# Patient Record
Sex: MALE | Race: WHITE | NOT HISPANIC OR LATINO | Employment: FULL TIME | URBAN - NONMETROPOLITAN AREA
[De-identification: names, ages, dates, MRNs, and addresses within clinical notes are randomized per-mention and may not be internally consistent; named-entity substitution may affect disease eponyms.]

---

## 2017-04-10 ENCOUNTER — APPOINTMENT (OUTPATIENT)
Dept: LAB | Facility: HOSPITAL | Age: 42
End: 2017-04-10

## 2017-04-10 ENCOUNTER — OFFICE VISIT (OUTPATIENT)
Dept: FAMILY MEDICINE CLINIC | Facility: CLINIC | Age: 42
End: 2017-04-10

## 2017-04-10 VITALS
HEART RATE: 106 BPM | OXYGEN SATURATION: 98 % | HEIGHT: 70 IN | SYSTOLIC BLOOD PRESSURE: 126 MMHG | BODY MASS INDEX: 31.02 KG/M2 | WEIGHT: 216.7 LBS | DIASTOLIC BLOOD PRESSURE: 84 MMHG

## 2017-04-10 DIAGNOSIS — M54.41 CHRONIC BILATERAL LOW BACK PAIN WITH BILATERAL SCIATICA: ICD-10-CM

## 2017-04-10 DIAGNOSIS — M54.42 CHRONIC BILATERAL LOW BACK PAIN WITH BILATERAL SCIATICA: ICD-10-CM

## 2017-04-10 DIAGNOSIS — Z23 NEED FOR TDAP VACCINATION: ICD-10-CM

## 2017-04-10 DIAGNOSIS — I10 ESSENTIAL HYPERTENSION: ICD-10-CM

## 2017-04-10 DIAGNOSIS — Z76.89 ENCOUNTER TO ESTABLISH CARE: Primary | ICD-10-CM

## 2017-04-10 DIAGNOSIS — Z76.0 MEDICATION REFILL: ICD-10-CM

## 2017-04-10 DIAGNOSIS — G89.29 CHRONIC BILATERAL LOW BACK PAIN WITH BILATERAL SCIATICA: ICD-10-CM

## 2017-04-10 PROBLEM — G43.009 MIGRAINE WITHOUT AURA AND WITHOUT STATUS MIGRAINOSUS, NOT INTRACTABLE: Status: ACTIVE | Noted: 2017-04-10

## 2017-04-10 PROBLEM — J30.2 SEASONAL ALLERGIES: Status: ACTIVE | Noted: 2017-04-10

## 2017-04-10 PROBLEM — M54.40 CHRONIC BILATERAL LOW BACK PAIN WITH SCIATICA: Status: ACTIVE | Noted: 2017-04-10

## 2017-04-10 PROBLEM — J45.20 MILD INTERMITTENT ASTHMA WITHOUT COMPLICATION: Status: ACTIVE | Noted: 2017-04-10

## 2017-04-10 LAB
ALBUMIN SERPL-MCNC: 4.7 G/DL (ref 3.4–4.8)
ALBUMIN/GLOB SERPL: 1.5 G/DL (ref 1.1–1.8)
ALP SERPL-CCNC: 69 U/L (ref 38–126)
ALT SERPL W P-5'-P-CCNC: 25 U/L (ref 21–72)
ANION GAP SERPL CALCULATED.3IONS-SCNC: 17 MMOL/L (ref 5–15)
AST SERPL-CCNC: 59 U/L (ref 17–59)
BASOPHILS # BLD AUTO: 0.03 10*3/MM3 (ref 0–0.2)
BASOPHILS NFR BLD AUTO: 0.3 % (ref 0–2)
BILIRUB SERPL-MCNC: 0.9 MG/DL (ref 0.2–1.3)
BUN BLD-MCNC: 20 MG/DL (ref 7–21)
BUN/CREAT SERPL: 16.7 (ref 7–25)
CALCIUM SPEC-SCNC: 10 MG/DL (ref 8.4–10.2)
CHLORIDE SERPL-SCNC: 106 MMOL/L (ref 95–110)
CO2 SERPL-SCNC: 23 MMOL/L (ref 22–31)
CREAT BLD-MCNC: 1.2 MG/DL (ref 0.7–1.3)
DEPRECATED RDW RBC AUTO: 42.3 FL (ref 35.1–43.9)
EOSINOPHIL # BLD AUTO: 0.1 10*3/MM3 (ref 0–0.7)
EOSINOPHIL NFR BLD AUTO: 0.9 % (ref 0–7)
ERYTHROCYTE [DISTWIDTH] IN BLOOD BY AUTOMATED COUNT: 13.4 % (ref 11.5–14.5)
GFR SERPL CREATININE-BSD FRML MDRD: 66 ML/MIN/1.73 (ref 60–147)
GLOBULIN UR ELPH-MCNC: 3.2 GM/DL (ref 2.3–3.5)
GLUCOSE BLD-MCNC: 92 MG/DL (ref 60–100)
HCT VFR BLD AUTO: 45.3 % (ref 39–49)
HGB BLD-MCNC: 15.9 G/DL (ref 13.7–17.3)
IMM GRANULOCYTES # BLD: 0.02 10*3/MM3 (ref 0–0.02)
IMM GRANULOCYTES NFR BLD: 0.2 % (ref 0–0.5)
LYMPHOCYTES # BLD AUTO: 3.14 10*3/MM3 (ref 0.6–4.2)
LYMPHOCYTES NFR BLD AUTO: 29.3 % (ref 10–50)
MCH RBC QN AUTO: 30.2 PG (ref 26.5–34)
MCHC RBC AUTO-ENTMCNC: 35.1 G/DL (ref 31.5–36.3)
MCV RBC AUTO: 86.1 FL (ref 80–98)
MONOCYTES # BLD AUTO: 0.46 10*3/MM3 (ref 0–0.9)
MONOCYTES NFR BLD AUTO: 4.3 % (ref 0–12)
NEUTROPHILS # BLD AUTO: 6.95 10*3/MM3 (ref 2–8.6)
NEUTROPHILS NFR BLD AUTO: 65 % (ref 37–80)
PLATELET # BLD AUTO: 290 10*3/MM3 (ref 150–450)
PMV BLD AUTO: 10.9 FL (ref 8–12)
POTASSIUM BLD-SCNC: 4.1 MMOL/L (ref 3.5–5.1)
PROT SERPL-MCNC: 7.9 G/DL (ref 6.3–8.6)
RBC # BLD AUTO: 5.26 10*6/MM3 (ref 4.37–5.74)
SODIUM BLD-SCNC: 146 MMOL/L (ref 137–145)
WBC NRBC COR # BLD: 10.7 10*3/MM3 (ref 3.2–9.8)

## 2017-04-10 PROCEDURE — 85025 COMPLETE CBC W/AUTO DIFF WBC: CPT | Performed by: FAMILY MEDICINE

## 2017-04-10 PROCEDURE — 36415 COLL VENOUS BLD VENIPUNCTURE: CPT | Performed by: STUDENT IN AN ORGANIZED HEALTH CARE EDUCATION/TRAINING PROGRAM

## 2017-04-10 PROCEDURE — 80053 COMPREHEN METABOLIC PANEL: CPT | Performed by: FAMILY MEDICINE

## 2017-04-10 PROCEDURE — 90715 TDAP VACCINE 7 YRS/> IM: CPT | Performed by: STUDENT IN AN ORGANIZED HEALTH CARE EDUCATION/TRAINING PROGRAM

## 2017-04-10 PROCEDURE — 99203 OFFICE O/P NEW LOW 30 MIN: CPT | Performed by: STUDENT IN AN ORGANIZED HEALTH CARE EDUCATION/TRAINING PROGRAM

## 2017-04-10 PROCEDURE — 90471 IMMUNIZATION ADMIN: CPT | Performed by: STUDENT IN AN ORGANIZED HEALTH CARE EDUCATION/TRAINING PROGRAM

## 2017-04-10 RX ORDER — GABAPENTIN 100 MG/1
CAPSULE ORAL
Refills: 2 | COMMUNITY
Start: 2017-03-13 | End: 2017-04-10 | Stop reason: SDUPTHER

## 2017-04-10 RX ORDER — NAPROXEN 500 MG/1
500 TABLET ORAL 2 TIMES DAILY WITH MEALS
Qty: 60 TABLET | Refills: 5 | Status: SHIPPED | OUTPATIENT
Start: 2017-04-10 | End: 2017-04-21

## 2017-04-10 RX ORDER — HYDROCHLOROTHIAZIDE 12.5 MG/1
12.5 CAPSULE, GELATIN COATED ORAL EVERY MORNING
Qty: 30 CAPSULE | Refills: 5 | Status: SHIPPED | OUTPATIENT
Start: 2017-04-10 | End: 2019-12-02 | Stop reason: SDUPTHER

## 2017-04-10 RX ORDER — HYDROCODONE BITARTRATE AND ACETAMINOPHEN 7.5; 325 MG/1; MG/1
TABLET ORAL
Refills: 0 | COMMUNITY
Start: 2017-03-13 | End: 2019-12-02 | Stop reason: DRUGHIGH

## 2017-04-10 RX ORDER — AMITRIPTYLINE HYDROCHLORIDE 10 MG/1
TABLET, FILM COATED ORAL
Refills: 5 | COMMUNITY
Start: 2017-03-13 | End: 2017-04-10 | Stop reason: SDUPTHER

## 2017-04-10 RX ORDER — FLUTICASONE PROPIONATE 50 MCG
SPRAY, SUSPENSION (ML) NASAL
Refills: 5 | COMMUNITY
Start: 2017-03-13 | End: 2017-04-10 | Stop reason: SDUPTHER

## 2017-04-10 RX ORDER — LORATADINE 10 MG/1
10 TABLET ORAL DAILY
Qty: 30 TABLET | Refills: 5 | Status: SHIPPED | OUTPATIENT
Start: 2017-04-10 | End: 2019-12-02

## 2017-04-10 RX ORDER — AMITRIPTYLINE HYDROCHLORIDE 10 MG/1
10 TABLET, FILM COATED ORAL NIGHTLY
Qty: 30 TABLET | Refills: 5 | Status: SHIPPED | OUTPATIENT
Start: 2017-04-10 | End: 2019-12-02

## 2017-04-10 RX ORDER — FLUTICASONE PROPIONATE 50 MCG
1 SPRAY, SUSPENSION (ML) NASAL DAILY
Qty: 1 EACH | Refills: 5 | Status: SHIPPED | OUTPATIENT
Start: 2017-04-10

## 2017-04-10 RX ORDER — ALBUTEROL SULFATE 90 UG/1
2 AEROSOL, METERED RESPIRATORY (INHALATION) EVERY 4 HOURS PRN
Qty: 2 INHALER | Refills: 11 | Status: SHIPPED | OUTPATIENT
Start: 2017-04-10

## 2017-04-10 RX ORDER — ALBUTEROL SULFATE 90 UG/1
AEROSOL, METERED RESPIRATORY (INHALATION)
Refills: 11 | COMMUNITY
Start: 2017-02-11 | End: 2017-04-10 | Stop reason: SDUPTHER

## 2017-04-10 RX ORDER — GABAPENTIN 100 MG/1
300 CAPSULE ORAL 3 TIMES DAILY
Qty: 90 CAPSULE | Refills: 5 | Status: SHIPPED | OUTPATIENT
Start: 2017-04-10 | End: 2019-12-02

## 2017-04-10 RX ORDER — LORATADINE 10 MG/1
TABLET ORAL
Refills: 5 | COMMUNITY
Start: 2017-03-13 | End: 2017-04-10 | Stop reason: SDUPTHER

## 2017-04-10 RX ORDER — HYDROCHLOROTHIAZIDE 12.5 MG/1
CAPSULE, GELATIN COATED ORAL
Refills: 5 | COMMUNITY
Start: 2017-03-13 | End: 2017-04-10 | Stop reason: SDUPTHER

## 2017-04-10 RX ORDER — NAPROXEN 500 MG/1
TABLET ORAL
Refills: 5 | COMMUNITY
Start: 2017-03-13 | End: 2017-04-10 | Stop reason: SDUPTHER
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
## 2017-04-21 RX ORDER — MELOXICAM 15 MG/1
15 TABLET ORAL DAILY
Qty: 30 TABLET | Refills: 3 | Status: SHIPPED | OUTPATIENT
Start: 2017-04-21 | End: 2017-04-27

## 2017-04-27 ENCOUNTER — TELEPHONE (OUTPATIENT)
Dept: FAMILY MEDICINE CLINIC | Facility: CLINIC | Age: 42
End: 2017-04-27

## 2017-04-27 RX ORDER — CELECOXIB 100 MG/1
200 CAPSULE ORAL 2 TIMES DAILY
Qty: 60 CAPSULE | Refills: 3 | Status: SHIPPED | OUTPATIENT
Start: 2017-04-27 | End: 2019-12-02 | Stop reason: ALTCHOICE

## 2017-04-27 NOTE — TELEPHONE ENCOUNTER
Called pt about his back pain. He has taken mobic before with no help. I will stop the mobic and give celebrex.          This document has been electronically signed by Jared Rodgers MD on April 27, 2017 12:57 PM

## 2017-08-22 ENCOUNTER — TRANSCRIBE ORDERS (OUTPATIENT)
Dept: PODIATRY | Facility: CLINIC | Age: 42
End: 2017-08-22

## 2017-08-22 ENCOUNTER — OFFICE VISIT (OUTPATIENT)
Dept: PODIATRY | Facility: CLINIC | Age: 42
End: 2017-08-22

## 2017-08-22 VITALS
DIASTOLIC BLOOD PRESSURE: 83 MMHG | OXYGEN SATURATION: 96 % | BODY MASS INDEX: 32.64 KG/M2 | HEIGHT: 70 IN | SYSTOLIC BLOOD PRESSURE: 146 MMHG | WEIGHT: 228 LBS | HEART RATE: 52 BPM

## 2017-08-22 DIAGNOSIS — M79.672 LEFT FOOT PAIN: ICD-10-CM

## 2017-08-22 DIAGNOSIS — M79.2 NEURITIS: ICD-10-CM

## 2017-08-22 DIAGNOSIS — M77.40 METATARSALGIA, UNSPECIFIED LATERALITY: Primary | ICD-10-CM

## 2017-08-22 DIAGNOSIS — M77.42 METATARSALGIA OF LEFT FOOT: ICD-10-CM

## 2017-08-22 DIAGNOSIS — L85.1 ACQUIRED KERATODERMA: Primary | ICD-10-CM

## 2017-08-22 DIAGNOSIS — M21.629 TAILOR'S BUNION: ICD-10-CM

## 2017-08-22 PROCEDURE — 99203 OFFICE O/P NEW LOW 30 MIN: CPT | Performed by: PODIATRIST

## 2017-08-22 RX ORDER — TRIAMCINOLONE ACETONIDE 0.25 MG/G
OINTMENT TOPICAL
COMMUNITY
Start: 2017-08-07 | End: 2022-10-12

## 2017-08-22 RX ORDER — NAPROXEN 500 MG/1
500 TABLET ORAL
COMMUNITY
Start: 2017-08-07 | End: 2019-12-02

## 2017-08-22 RX ORDER — HYDROCODONE BITARTRATE AND ACETAMINOPHEN 7.5; 325 MG/1; MG/1
1 TABLET ORAL
COMMUNITY
Start: 2017-08-07 | End: 2019-12-02 | Stop reason: SDUPTHER

## 2017-08-22 RX ORDER — AMITRIPTYLINE HYDROCHLORIDE 10 MG/1
10 TABLET, FILM COATED ORAL
COMMUNITY
Start: 2017-08-07 | End: 2018-08-08

## 2017-08-22 RX ORDER — HYDROCHLOROTHIAZIDE 25 MG/1
25 TABLET ORAL
COMMUNITY
Start: 2017-08-07

## 2017-08-22 RX ORDER — SUMATRIPTAN 50 MG/1
50 TABLET, FILM COATED ORAL AS NEEDED
COMMUNITY
Start: 2017-06-08

## 2017-08-22 RX ORDER — FLUTICASONE PROPIONATE 50 MCG
1 SPRAY, SUSPENSION (ML) NASAL
COMMUNITY
Start: 2017-08-07 | End: 2019-12-02 | Stop reason: SDUPTHER

## 2017-08-22 RX ORDER — LORATADINE 10 MG/1
10 TABLET ORAL
COMMUNITY
Start: 2017-08-07 | End: 2019-12-02

## 2017-08-22 RX ORDER — GABAPENTIN 600 MG/1
600 TABLET ORAL
COMMUNITY
Start: 2017-08-07 | End: 2019-12-02

## 2017-08-22 NOTE — PROGRESS NOTES
Marko Izaguirre  1975  42 y.o. male    08/22/2017  Chief Complaint   Patient presents with   • Left Foot - Pain           History of Present Illness    Marko Izaguirre is a 42 y.o. male who presents for evaluation of left foot pain.  He states the pain is located near his fifth toe.  He believes is related to a skin lesion which may be a plantar wart.  He describes the pain as sharp and worse with direct pressure or weightbearing.  He denies any trauma or injuries.  He denies any previous treatments for this issue.  He does also note some numbness and tingling sensations to his right big toe.  These issues are chronic for him and have been worsening over the past year.      Past Medical History:   Diagnosis Date   • Asthma    • Back pain    • Backache    • Benign hypertension    • GERD (gastroesophageal reflux disease)    • Headache    • Hypertension    • Migraine    • Osteoarthritis of multiple joints    • Paresthesia of both hands    • Seasonal allergies    • Tobacco dependence syndrome          Past Surgical History:   Procedure Laterality Date   • DENTAL PROCEDURE           Family History   Problem Relation Age of Onset   • Hypertension Mother    • Brain cancer Father    • Skin cancer Paternal Grandfather    • Cancer Other    • Heart disease Other    • Osteoarthritis Other          Social History     Social History   • Marital status:      Spouse name: N/A   • Number of children: N/A   • Years of education: N/A     Occupational History   • Not on file.     Social History Main Topics   • Smoking status: Current Every Day Smoker     Packs/day: 0.33     Years: 7.00     Types: Cigarettes   • Smokeless tobacco: Current User     Types: Chew   • Alcohol use Yes      Comment: rare   • Drug use: No   • Sexual activity: Not on file     Other Topics Concern   • Not on file     Social History Narrative         Current Outpatient Prescriptions   Medication Sig Dispense Refill   • amitriptyline (ELAVIL) 10  "MG tablet Take 10 mg by mouth.     • fluticasone (FLONASE) 50 MCG/ACT nasal spray 1 spray into each nostril.     • gabapentin (NEURONTIN) 600 MG tablet Take 600 mg by mouth.     • hydrochlorothiazide (HYDRODIURIL) 25 MG tablet Take 25 mg by mouth.     • HYDROcodone-acetaminophen (NORCO) 7.5-325 MG per tablet Take 1 tablet by mouth.     • loratadine (CLARITIN) 10 MG tablet Take 10 mg by mouth.     • naproxen (NAPROSYN) 500 MG tablet Take 500 mg by mouth.     • SUMAtriptan (IMITREX) 50 MG tablet Take 50 mg by mouth.     • triamcinolone (KENALOG) 0.025 % ointment Apply to affected area bid     • amitriptyline (ELAVIL) 10 MG tablet Take 1 tablet by mouth Every Night. 30 tablet 5   • celecoxib (CELEBREX) 100 MG capsule Take 2 capsules by mouth 2 (Two) Times a Day. 60 capsule 3   • fluticasone (FLONASE) 50 MCG/ACT nasal spray 1 spray into each nostril Daily. 1 each 5   • gabapentin (NEURONTIN) 100 MG capsule Take 3 capsules by mouth 3 (Three) Times a Day. 90 capsule 5   • hydrochlorothiazide (MICROZIDE) 12.5 MG capsule Take 1 capsule by mouth Every Morning. 30 capsule 5   • HYDROcodone-acetaminophen (NORCO) 7.5-325 MG per tablet TAKE 1 TABLET TWICE DAILY AS NEEDED  0   • loratadine (CLARITIN) 10 MG tablet Take 1 tablet by mouth Daily. 30 tablet 5   • VENTOLIN  (90 BASE) MCG/ACT inhaler Inhale 2 puffs Every 4 (Four) Hours As Needed for Wheezing. 2 inhaler 11     No current facility-administered medications for this visit.          OBJECTIVE    /83  Pulse 52  Ht 70\" (177.8 cm)  Wt 228 lb (103 kg)  SpO2 96%  BMI 32.71 kg/m2      Review of Systems   Constitutional:  Denies recent weight loss, weight gain, fever or chills, no change in exercise tolerance  Musculoskeletal: Foot pain.   Skin:  Skin lesion  Neurological:  Tingling, numbness sensations to feet b/l.  Psychiatric/Behavioral: Denies depression        Physical Exam   Constitutional: he appears well-developed and well-nourished.   CV: No chest pain. " Normal RR  Resp: Non labored respirations  Psychiatric: he has a normal mood and affect. her behavior is normal.      Lower Extremity Exam:  Vascular: DP/PT pulses palpable 2+.   No edema  Toes warm  Neuro: Protective sensation intact, b/l.  DTRs intact  Integument: No open wounds.  No erythema, scaling  Single IPK to plantarlateral left 5th MTPJ. +tenderness to palpation  Web spaces c/d/i  Musculoskeletal: LE muscle strength 5/5.   Gait normal  Ankle ROM full without pain or crepitus  STJ ROM full without pain or crepitus  Mild HAV  Mild tailors bunion, left              ASSESSMENT AND PLAN    Marko was seen today for pain.    Diagnoses and all orders for this visit:    Acquired keratoderma    Metatarsalgia of left foot    Neuritis    Left foot pain    -Comprehensive foot and ankle exam performed  -Radiographs ordered and reviewed  -Educated pt on diagnosis, etiology and treatment of IPKs, metatarsalgia.  -Discussed proper motion control shoe gear.  -Referral to sports med for custom orthotic fabrication  -Rx [facility]  -Above noted HPK debrided with 15 blade to epidermis without incident  -Recheck 4 weeks, prn            This document has been electronically signed by Jared Rabago DPM on August 28, 2017 8:23 PM     EMR Dragon/Transcription disclaimer:   Much of this encounter note is an electronic transcription/translation of spoken language to printed text. The electronic translation of spoken language may permit erroneous, or at times, nonsensical words or phrases to be inadvertently transcribed; Although I have reviewed the note for such errors, some may still exist.    Jared Rabago DPM  8/28/2017  8:23 PM

## 2017-08-24 ENCOUNTER — APPOINTMENT (OUTPATIENT)
Dept: PHYSICAL THERAPY | Facility: HOSPITAL | Age: 42
End: 2017-08-24

## 2017-08-29 ENCOUNTER — HOSPITAL ENCOUNTER (OUTPATIENT)
Dept: PHYSICAL THERAPY | Facility: HOSPITAL | Age: 42
Setting detail: THERAPIES SERIES
Discharge: HOME | End: 2017-08-29
Attending: PODIATRIST

## 2017-08-29 DIAGNOSIS — M77.40 METATARSALGIA, UNSPECIFIED LATERALITY: Primary | ICD-10-CM

## 2017-08-29 DIAGNOSIS — M21.629 TAILOR'S BUNION: ICD-10-CM

## 2017-08-29 PROCEDURE — 97161 PT EVAL LOW COMPLEX 20 MIN: CPT | Performed by: PHYSICAL THERAPIST

## 2017-08-29 NOTE — THERAPY EVALUATION
Outpatient Physical Therapy Ortho Initial Evaluation  [facility]     Patient Name: Marko Izaguirre  : 1975  MRN: [id number]  Today's Date: 2017      Visit Date: 2017  Attendance:   Subjective % Improvement: N/A  Recert Date: 2017  MD appointment: TBD    Patient Active Problem List   Diagnosis   • Essential hypertension   • Migraine without aura and without status migrainosus, not intractable   • Seasonal allergies   • Mild intermittent asthma without complication   • Chronic bilateral low back pain with sciatica        Past Medical History:   Diagnosis Date   • Asthma    • Back pain    • Backache    • Benign hypertension    • GERD (gastroesophageal reflux disease)    • Headache    • Hypertension    • Migraine    • Osteoarthritis of multiple joints    • Paresthesia of both hands    • Seasonal allergies    • Tobacco dependence syndrome         Past Surgical History:   Procedure Laterality Date   • DENTAL PROCEDURE         Visit Dx:     ICD-10-CM ICD-9-CM   1. Metatarsalgia, unspecified laterality M77.40 726.70   2. Tailor's bunion M21.629 727.1             Patient History       17 0808          History    Chief Complaint Pain;Difficulty Walking  -BB      Type of Pain Foot pain  -BB      Date Current Problem(s) Began --   Chronic   -BB      Brief Description of Current Complaint Reports having calluses on both feet chronically with left callus causing most pain on the lateral side of the foot. Reports trying several different things at home that didnt seem to help. Went to the MD and they shaved on the left lateral foot that made a difference in the pain the patient reported helping. Has a callus creme that has started using this weekned. No history of orthotics   -BB      Onset Date- PT 2017  -BB      Patient/Caregiver Goals Relieve pain  -BB      Patient's Rating of General Health Good  -BB      Hand Dominance right-handed  -BB      Occupation/sports/leisure  "activities [employer]    -BB      Results of Clinical Tests None   -BB      Pain     Pain Location Foot  -BB      Pain at Present 4  -BB      Pain at Best 2;4  -BB      Pain at Worst 10  -BB      Pain Frequency Constant/continuous  -BB      Pain Description --   \"feels like a knife going through it\"  -BB      What Performance Factors Make the Current Problem(s) WORSE? weightbearing, walking   -BB      What Performance Factors Make the Current Problem(s) BETTER? resting   -BB      Tolerance Time- Standing unlimited with pain   -BB      Tolerance Time- Walking unlimited with pain   -BB      Is your sleep disturbed? No  -BB      Is medication used to assist with sleep? No  -BB      Fall Risk Assessment    Any falls in the past year: Unspecified  -BB      Number of falls reported in the last 12 months unsure-relates it to snow weather  -BB        User Key  (r) = Recorded By, (t) = Taken By, (c) = Cosigned By    Initials Name Provider Type    BB Adali Hewitt, PT Physical Therapist                PT Ortho       08/29/17 0800    Subjective Comments    Subjective Comments See patient history   -BB    Precautions and Contraindications    Precautions/Limitations no known precautions/limitations  -BB    Subjective Pain    Able to rate subjective pain? yes  -BB    Pre-Treatment Pain Level 4  -BB    Post-Treatment Pain Level 4  -BB    Posture/Observations    Posture/Observations Comments No acute distress. No antalgic gait. Callus seen on left lateral (5th) ray and right great toe.   -BB    Quarter Clearing    Quarter Clearing Lower Quarter Clearing  -BB    Sensory Screen for Light Touch- Lower Quarter Clearing    L4 (medial lower leg/foot) Bilateral:;Intact  -BB    L5 (lateral lower leg/great toe) Bilateral:;Intact  -BB    S1 (bottom of foot) Bilateral:;Intact  -BB    Special Tests/Palpation    Special Tests/Palpation Ankle/Foot   no ttp bilaterally,   -BB    Toes Accessory Motion    Toes Accessory Motions " Tested? Yes   Crepitus left met heads 1-3   -BB    ROM (Range of Motion)    General ROM no range of motion deficits identified  -BB    MMT (Manual Muscle Testing)    General MMT Assessment no strength deficits identified  -BB    Transfers    Transfer, Comment Independent   -BB    Gait Assessment/Treatment    Gait, Comment Pes cavus. Ambulates barefoot with bilateral feet in supinated position during stance phase.   -BB      User Key  (r) = Recorded By, (t) = Taken By, (c) = Cosigned By    Initials Name Provider Type    MISSY Hewitt PT Physical Therapist                            Therapy Education       08/29/17 0808          Therapy Education    Education Details Wear schedule for orthotics   -BB      Given Symptoms/condition management  -BB      Program New  -BB      How Provided Verbal  -BB      Provided to Patient  -BB      Level of Understanding Verbalized  -BB        User Key  (r) = Recorded By, (t) = Taken By, (c) = Cosigned By    Initials Name Provider Type    MISSY Hewitt PT Physical Therapist                PT OP Goals       08/29/17 0808       PT Short Term Goals    STG Date to Achieve 09/19/17  -BB     STG 1 Good fit of orthotic   -BB     STG 1 Progress New  -BB     STG 2 Independent in wear/care   -BB     STG 2 Progress New  -BB     Time Calculation    PT Goal Re-Cert Due Date 09/19/17  -BB       User Key  (r) = Recorded By, (t) = Taken By, (c) = Cosigned By    Initials Name Provider Type    MISSY Hewitt PT Physical Therapist                PT Assessment/Plan       08/29/17 0808       PT Assessment    Functional Limitations Performance in work activities;Impaired gait  -BB     Impairments Gait;Pain  -BB     Assessment Comments Patient presents with bilateral foot pain with left greater than worse with callus present. Patient has met head mobility that is semi rigid with crepitus noted on left. Patient ambulates in with increased supination. Patient could benefit from custom orthotics to  improve mechanics of bilateral feet. Good mold seen today with no difficulties noted. Orthotics to be fabricated per MD orders.  -BB     Please refer to paper survey for additional self-reported information No  -BB     Rehab Potential Good  -BB     Patient/caregiver participated in establishment of treatment plan and goals Yes  -BB     Patient would benefit from skilled therapy intervention Yes  -BB     PT Plan    PT Frequency 1x/week  -BB     Predicted Duration of Therapy Intervention (days/wks) 1-2 visits for orthotic   -BB     Planned CPT's? PT EVAL LOW COMPLEXITY: 69142;PT RE-EVAL: 08926;PT THER SUPP EA 15 MIN;PT ORTHOTIC MGMT/TRAIN EA 15 MIN: 44526  -BB     Physical Therapy Interventions (Optional Details) orthotic fitting/training;home exercise program;other (see comments)  -BB     PT Plan Comments Orthotic pickup/adjustment   -BB       User Key  (r) = Recorded By, (t) = Taken By, (c) = Cosigned By    Initials Name Provider Type    MISSY Hewitt, PT Physical Therapist                  Exercises       08/29/17 0800          Subjective Comments    Subjective Comments See patient history   -BB      Subjective Pain    Able to rate subjective pain? yes  -BB      Pre-Treatment Pain Level 4  -BB      Post-Treatment Pain Level 4  -BB        User Key  (r) = Recorded By, (t) = Taken By, (c) = Cosigned By    Initials Name Provider Type    MISSY Hewitt, PT Physical Therapist           Manual Rx (last 36 hours)      Manual Treatments       08/29/17 0808          Manual Rx 1    Manual Rx 1 Location Bilateral feet   -BB      Manual Rx 1 Type Plaster mold orthotics   -BB        User Key  (r) = Recorded By, (t) = Taken By, (c) = Cosigned By    Initials Name Provider Type    MISSY Hewitt, PT Physical Therapist                            Time Calculation:   Start Time: 0808  Stop Time: 0851  Time Calculation (min): 43 min  Total Timed Code Minutes- PT: 0 minute(s) (Eval only and orthotics)     Therapy Charges for  Today     Code Description Service Date Service Provider Modifiers Qty    63647694481 HC PT EVAL LOW COMPLEXITY 2 8/29/2017 Adali Hewitt, PT GP 1    18181533550 HC PT-CUSTOM ORTHOTICS-LEVEL 2 8/29/2017 Adali Hewitt, PT  1                    Adali Hewitt, PT  8/29/2017

## 2017-09-07 ENCOUNTER — APPOINTMENT (OUTPATIENT)
Dept: CT IMAGING | Facility: HOSPITAL | Age: 42
End: 2017-09-07

## 2017-09-07 ENCOUNTER — HOSPITAL ENCOUNTER (EMERGENCY)
Facility: HOSPITAL | Age: 42
Discharge: HOME | End: 2017-09-07
Attending: EMERGENCY MEDICINE | Admitting: EMERGENCY MEDICINE

## 2017-09-07 ENCOUNTER — APPOINTMENT (OUTPATIENT)
Dept: GENERAL RADIOLOGY | Facility: HOSPITAL | Age: 42
End: 2017-09-07

## 2017-09-07 VITALS
TEMPERATURE: 98 F | HEART RATE: 88 BPM | BODY MASS INDEX: 32.64 KG/M2 | DIASTOLIC BLOOD PRESSURE: 89 MMHG | OXYGEN SATURATION: 97 % | SYSTOLIC BLOOD PRESSURE: 137 MMHG | RESPIRATION RATE: 18 BRPM | HEIGHT: 70 IN | WEIGHT: 228 LBS

## 2017-09-07 DIAGNOSIS — R10.31 RIGHT LOWER QUADRANT ABDOMINAL PAIN: Primary | ICD-10-CM

## 2017-09-07 LAB
ALBUMIN SERPL-MCNC: 4.3 G/DL (ref 3.4–4.8)
ALBUMIN/GLOB SERPL: 1.4 G/DL (ref 1.1–1.8)
ALP SERPL-CCNC: 68 U/L (ref 38–126)
ALT SERPL W P-5'-P-CCNC: 41 U/L (ref 21–72)
AMPHET+METHAMPHET UR QL: NEGATIVE
AMYLASE SERPL-CCNC: 73 U/L (ref 50–130)
ANION GAP SERPL CALCULATED.3IONS-SCNC: 10 MMOL/L (ref 5–15)
AST SERPL-CCNC: 34 U/L (ref 17–59)
BARBITURATES UR QL SCN: NEGATIVE
BASOPHILS # BLD AUTO: 0.04 10*3/MM3 (ref 0–0.2)
BASOPHILS NFR BLD AUTO: 0.6 % (ref 0–2)
BENZODIAZ UR QL SCN: NEGATIVE
BILIRUB SERPL-MCNC: 0.9 MG/DL (ref 0.2–1.3)
BILIRUB UR QL STRIP: NEGATIVE
BUN BLD-MCNC: 15 MG/DL (ref 7–21)
BUN/CREAT SERPL: 16.1 (ref 7–25)
CALCIUM SPEC-SCNC: 9.7 MG/DL (ref 8.4–10.2)
CANNABINOIDS SERPL QL: NEGATIVE
CHLORIDE SERPL-SCNC: 101 MMOL/L (ref 95–110)
CLARITY UR: CLEAR
CO2 SERPL-SCNC: 27 MMOL/L (ref 22–31)
COCAINE UR QL: NEGATIVE
COLOR UR: YELLOW
CREAT BLD-MCNC: 0.93 MG/DL (ref 0.7–1.3)
DEPRECATED RDW RBC AUTO: 42.6 FL (ref 35.1–43.9)
EOSINOPHIL # BLD AUTO: 0.21 10*3/MM3 (ref 0–0.7)
EOSINOPHIL NFR BLD AUTO: 2.9 % (ref 0–7)
ERYTHROCYTE [DISTWIDTH] IN BLOOD BY AUTOMATED COUNT: 13.2 % (ref 11.5–14.5)
GFR SERPL CREATININE-BSD FRML MDRD: 89 ML/MIN/1.73 (ref 63–147)
GLOBULIN UR ELPH-MCNC: 3 GM/DL (ref 2.3–3.5)
GLUCOSE BLD-MCNC: 95 MG/DL (ref 60–100)
GLUCOSE UR STRIP-MCNC: NEGATIVE MG/DL
HCT VFR BLD AUTO: 43.8 % (ref 39–49)
HGB BLD-MCNC: 15.6 G/DL (ref 13.7–17.3)
HGB UR QL STRIP.AUTO: NEGATIVE
HOLD SPECIMEN: NORMAL
HOLD SPECIMEN: NORMAL
IMM GRANULOCYTES # BLD: 0.01 10*3/MM3 (ref 0–0.02)
IMM GRANULOCYTES NFR BLD: 0.1 % (ref 0–0.5)
KETONES UR QL STRIP: NEGATIVE
LEUKOCYTE ESTERASE UR QL STRIP.AUTO: NEGATIVE
LIPASE SERPL-CCNC: 48 U/L (ref 23–300)
LYMPHOCYTES # BLD AUTO: 2.11 10*3/MM3 (ref 0.6–4.2)
LYMPHOCYTES NFR BLD AUTO: 29.6 % (ref 10–50)
MCH RBC QN AUTO: 31.6 PG (ref 26.5–34)
MCHC RBC AUTO-ENTMCNC: 35.6 G/DL (ref 31.5–36.3)
MCV RBC AUTO: 88.7 FL (ref 80–98)
METHADONE UR QL SCN: NEGATIVE
MONOCYTES # BLD AUTO: 0.61 10*3/MM3 (ref 0–0.9)
MONOCYTES NFR BLD AUTO: 8.6 % (ref 0–12)
NEUTROPHILS # BLD AUTO: 4.15 10*3/MM3 (ref 2–8.6)
NEUTROPHILS NFR BLD AUTO: 58.2 % (ref 37–80)
NITRITE UR QL STRIP: NEGATIVE
OPIATES UR QL: POSITIVE
OXYCODONE UR QL SCN: NEGATIVE
PH UR STRIP.AUTO: 5.5 [PH] (ref 5–9)
PLATELET # BLD AUTO: 219 10*3/MM3 (ref 150–450)
PMV BLD AUTO: 11.7 FL (ref 8–12)
POTASSIUM BLD-SCNC: 3.8 MMOL/L (ref 3.5–5.1)
PROT SERPL-MCNC: 7.3 G/DL (ref 6.3–8.6)
PROT UR QL STRIP: NEGATIVE
RBC # BLD AUTO: 4.94 10*6/MM3 (ref 4.37–5.74)
SODIUM BLD-SCNC: 138 MMOL/L (ref 137–145)
SP GR UR STRIP: 1.01 (ref 1–1.03)
TROPONIN I SERPL-MCNC: <0.012 NG/ML
UROBILINOGEN UR QL STRIP: NORMAL
WBC NRBC COR # BLD: 7.13 10*3/MM3 (ref 3.2–9.8)
WHOLE BLOOD HOLD SPECIMEN: NORMAL
WHOLE BLOOD HOLD SPECIMEN: NORMAL

## 2017-09-07 PROCEDURE — 71010 HC CHEST PA OR AP: CPT

## 2017-09-07 PROCEDURE — 99284 EMERGENCY DEPT VISIT MOD MDM: CPT

## 2017-09-07 PROCEDURE — 80307 DRUG TEST PRSMV CHEM ANLYZR: CPT | Performed by: PHYSICIAN ASSISTANT

## 2017-09-07 PROCEDURE — 80053 COMPREHEN METABOLIC PANEL: CPT | Performed by: PHYSICIAN ASSISTANT

## 2017-09-07 PROCEDURE — 82150 ASSAY OF AMYLASE: CPT | Performed by: PHYSICIAN ASSISTANT

## 2017-09-07 PROCEDURE — 83690 ASSAY OF LIPASE: CPT | Performed by: PHYSICIAN ASSISTANT

## 2017-09-07 PROCEDURE — 96375 TX/PRO/DX INJ NEW DRUG ADDON: CPT

## 2017-09-07 PROCEDURE — 25010000002 ONDANSETRON PER 1 MG: Performed by: PHYSICIAN ASSISTANT

## 2017-09-07 PROCEDURE — 93005 ELECTROCARDIOGRAM TRACING: CPT | Performed by: PHYSICIAN ASSISTANT

## 2017-09-07 PROCEDURE — 81003 URINALYSIS AUTO W/O SCOPE: CPT | Performed by: PHYSICIAN ASSISTANT

## 2017-09-07 PROCEDURE — 25010000002 KETOROLAC TROMETHAMINE PER 15 MG: Performed by: EMERGENCY MEDICINE

## 2017-09-07 PROCEDURE — 0 IOPAMIDOL 61 % SOLUTION: Performed by: EMERGENCY MEDICINE

## 2017-09-07 PROCEDURE — 96361 HYDRATE IV INFUSION ADD-ON: CPT

## 2017-09-07 PROCEDURE — 85025 COMPLETE CBC W/AUTO DIFF WBC: CPT | Performed by: PHYSICIAN ASSISTANT

## 2017-09-07 PROCEDURE — 84484 ASSAY OF TROPONIN QUANT: CPT | Performed by: PHYSICIAN ASSISTANT

## 2017-09-07 PROCEDURE — 93010 ELECTROCARDIOGRAM REPORT: CPT | Performed by: INTERNAL MEDICINE

## 2017-09-07 PROCEDURE — 96374 THER/PROPH/DIAG INJ IV PUSH: CPT

## 2017-09-07 PROCEDURE — 74177 CT ABD & PELVIS W/CONTRAST: CPT

## 2017-09-07 RX ORDER — SODIUM CHLORIDE 0.9 % (FLUSH) 0.9 %
10 SYRINGE (ML) INJECTION AS NEEDED
Status: DISCONTINUED | OUTPATIENT
Start: 2017-09-07 | End: 2017-09-07 | Stop reason: HOSPADM

## 2017-09-07 RX ORDER — ONDANSETRON 4 MG/1
4 TABLET, ORALLY DISINTEGRATING ORAL EVERY 6 HOURS PRN
Qty: 120 TABLET | Refills: 0 | Status: SHIPPED | OUTPATIENT
Start: 2017-09-07

## 2017-09-07 RX ORDER — FAMOTIDINE 10 MG/ML
20 INJECTION, SOLUTION INTRAVENOUS ONCE
Status: COMPLETED | OUTPATIENT
Start: 2017-09-07 | End: 2017-09-07

## 2017-09-07 RX ORDER — KETOROLAC TROMETHAMINE 30 MG/ML
30 INJECTION, SOLUTION INTRAMUSCULAR; INTRAVENOUS EVERY 6 HOURS PRN
Status: DISCONTINUED | OUTPATIENT
Start: 2017-09-07 | End: 2017-09-07 | Stop reason: HOSPADM

## 2017-09-07 RX ORDER — ONDANSETRON 2 MG/ML
4 INJECTION INTRAMUSCULAR; INTRAVENOUS ONCE
Status: COMPLETED | OUTPATIENT
Start: 2017-09-07 | End: 2017-09-07

## 2017-09-07 RX ORDER — SODIUM CHLORIDE 9 MG/ML
1000 INJECTION, SOLUTION INTRAVENOUS ONCE
Status: COMPLETED | OUTPATIENT
Start: 2017-09-07 | End: 2017-09-07

## 2017-09-07 RX ADMIN — FAMOTIDINE 20 MG: 10 INJECTION, SOLUTION INTRAVENOUS at 11:15

## 2017-09-07 RX ADMIN — ONDANSETRON 4 MG: 2 INJECTION INTRAMUSCULAR; INTRAVENOUS at 11:15

## 2017-09-07 RX ADMIN — SODIUM CHLORIDE 1000 ML: 900 INJECTION, SOLUTION INTRAVENOUS at 11:14

## 2017-09-07 RX ADMIN — IOPAMIDOL 80 ML: 612 INJECTION, SOLUTION INTRAVENOUS at 13:45

## 2017-09-07 RX ADMIN — KETOROLAC TROMETHAMINE 30 MG: 30 INJECTION, SOLUTION INTRAMUSCULAR; INTRAVENOUS at 12:42

## 2017-09-07 NOTE — ED NOTES
Discharge instructions and prescription x1 p[rovided, patient verbalized understanding. NAD noted. Ambulatory to exit, gait steady and unassisted.     Ronaldo Morrow RN  09/07/17 2926

## 2017-09-08 NOTE — ED PROVIDER NOTES
Subjective   Patient is a 42 y.o. male presenting with abdominal pain.   History provided by:  Patient   used: No    Abdominal Pain   Pain location:  RLQ  Pain quality: sharp and stabbing    Pain radiates to:  Does not radiate  Pain severity:  Mild  Onset quality:  Gradual  Duration:  5 days  Timing:  Constant  Progression:  Worsening  Chronicity:  New  Context: not alcohol use, not awakening from sleep, not diet changes, not eating, not laxative use, not medication withdrawal, not previous surgeries, not recent illness, not recent sexual activity, not recent travel, not retching, not sick contacts, not suspicious food intake and not trauma    Relieved by:  Nothing  Worsened by:  Nothing  Ineffective treatments:  None tried  Associated symptoms: nausea    Associated symptoms: no anorexia, no belching, no chest pain, no chills, no constipation, no cough, no diarrhea, no dysuria, no fatigue, no fever, no flatus, no hematemesis, no hematochezia, no hematuria, no melena, no shortness of breath, no sore throat, no vaginal bleeding, no vaginal discharge and no vomiting    Risk factors: no alcohol abuse, no aspirin use, not elderly, has not had multiple surgeries, no NSAID use, not obese, not pregnant and no recent hospitalization        Review of Systems   Constitutional: Negative.  Negative for chills, fatigue and fever.   HENT: Negative.  Negative for sore throat.    Eyes: Negative.    Respiratory: Negative.  Negative for cough and shortness of breath.    Cardiovascular: Negative.  Negative for chest pain.   Gastrointestinal: Positive for abdominal pain and nausea. Negative for abdominal distention, anal bleeding, anorexia, blood in stool, constipation, diarrhea, flatus, hematemesis, hematochezia, melena, rectal pain and vomiting.   Endocrine: Negative.    Genitourinary: Negative.  Negative for dysuria, hematuria, vaginal bleeding and vaginal discharge.   Musculoskeletal: Negative.    Skin: Negative.  "   Allergic/Immunologic: Negative.    Neurological: Negative.    Hematological: Negative.    Psychiatric/Behavioral: Negative.        Past Medical History:   Diagnosis Date   • Asthma    • Back pain    • Backache    • Benign hypertension    • GERD (gastroesophageal reflux disease)    • Headache    • Hypertension    • Migraine    • Osteoarthritis of multiple joints    • Paresthesia of both hands    • Seasonal allergies    • Tobacco dependence syndrome        Allergies   Allergen Reactions   • Codeine    • Diphenhydramine Other (See Comments)     \"gets cranky\"   • Phenergan [Promethazine Hcl] Nausea And Vomiting   • Promethazine Nausea And Vomiting       Past Surgical History:   Procedure Laterality Date   • DENTAL PROCEDURE         Family History   Problem Relation Age of Onset   • Hypertension Mother    • Brain cancer Father    • Skin cancer Paternal Grandfather    • Cancer Other    • Heart disease Other    • Osteoarthritis Other        Social History     Social History   • Marital status:      Spouse name: N/A   • Number of children: N/A   • Years of education: N/A     Social History Main Topics   • Smoking status: Current Every Day Smoker     Packs/day: 0.50     Years: 7.00     Types: Cigarettes   • Smokeless tobacco: Current User     Types: Chew   • Alcohol use Yes      Comment: rare   • Drug use: No   • Sexual activity: Not Asked     Other Topics Concern   • None     Social History Narrative   • None           Objective   Physical Exam   Constitutional: He is oriented to person, place, and time. He appears well-developed and well-nourished. No distress.   HENT:   Head: Normocephalic and atraumatic.   Mouth/Throat: No oropharyngeal exudate.   Eyes: EOM are normal. Right eye exhibits no discharge. Left eye exhibits no discharge. No scleral icterus.   Neck: Normal range of motion. Neck supple. No JVD present. No tracheal deviation present. No thyromegaly present.   Cardiovascular: Normal rate, regular " rhythm, normal heart sounds and intact distal pulses.  Exam reveals no gallop and no friction rub.    No murmur heard.  Pulmonary/Chest: Effort normal and breath sounds normal. No stridor. No respiratory distress. He has no wheezes. He has no rales. He exhibits no tenderness.   Abdominal: Soft. Bowel sounds are normal. He exhibits no distension and no mass. There is tenderness. There is no rebound and no guarding. No hernia.   Musculoskeletal: Normal range of motion. He exhibits no edema, tenderness or deformity.   Lymphadenopathy:     He has no cervical adenopathy.   Neurological: He is alert and oriented to person, place, and time. He has normal reflexes. He displays normal reflexes. No cranial nerve deficit. He exhibits normal muscle tone. Coordination normal.   Skin: Skin is warm. No rash noted. He is not diaphoretic. No erythema. No pallor.   Psychiatric: He has a normal mood and affect. His behavior is normal. Judgment and thought content normal.   Nursing note and vitals reviewed.      Procedures         ED Course  ED Course      Labs Reviewed   URINE DRUG SCREEN - Abnormal; Notable for the following:        Result Value    Opiate Screen Positive (*)     All other components within normal limits    Narrative:     Negative Thresholds For Drugs Screened in Urine:     Amphetamines          500 ng/ml  Barbiturates          200 ng/ml  Benzodiazepines       200 ng/ml  Cocaine               150 ng/ml  Methadone             300 ng/mL  Opiates               300 ng/mL  Oxycodone             100 ng/mL  THC                   20 ng/mL    The normal value for all drugs tested is negative. This report includes final unconfirmed screening results.  A positive result by this assay can be, at your request, sent to the Reference Lab for confirmation by gas chromatography. Unconfirmed results must not be used for non-medical purposes, such as employment or legal testing. Clinical consideration should be applied to any drug of  abuse test result, particularly when unconfirmed results are used.   COMPREHENSIVE METABOLIC PANEL - Normal   AMYLASE - Normal   LIPASE - Normal   URINALYSIS W/ CULTURE IF INDICATED - Normal    Narrative:     Urine microscopic not indicated.   TROPONIN (IN-HOUSE) - Normal   CBC WITH AUTO DIFFERENTIAL - Normal   RAINBOW DRAW    Narrative:     The following orders were created for panel order [place] Draw.  Procedure                               Abnormality         Status                     ---------                               -----------         ------                     Light Blue Top[[id number]]                                   Final result               Green Top (Gel)[[id number]]                                  Final result               Lavender Top[[id number]]                                     Final result               Gold Top - SST[[id number]]                                   Final result                 Please view results for these tests on the individual orders.   LIGHT BLUE TOP   GREEN TOP   LAVENDER TOP   GOLD TOP - SST   CBC AND DIFFERENTIAL    Narrative:     The following orders were created for panel order CBC & Differential.  Procedure                               Abnormality         Status                     ---------                               -----------         ------                     CBC Auto Differential[[id number]]        Normal              Final result                 Please view results for these tests on the individual orders.     Ct Abdomen Pelvis With Contrast    Result Date: 9/7/2017  Narrative: EXAMINATION:  Computed Tomography         REGION:    Abdomen / Pelvis                 INDICATION:    Right lower quadrant pain    HISTORY: BANDAR. IMAGING:    none        TECHNIQUE:    - reconstructions:    axial, coronal, sagittal       - contrast:      oral:  Yes ;   intravenous: Isovue 300, 80 mL This exam was performed according to the departmental dose-optimization program  which includes automated exposure control, adjustment of the mA and/or kV according to patient size and/or use of iterative reconstruction technique.       COMMENTS:        - - - CT ABDOMEN - - -      THORAX (INFERIOR):    - LUNG BASES:  clear    - PLEURA:    no fluid or mass    - HEART:    normal size, no pericardial fluid   - MISC:      n/a      ABDOMEN:   - LIVER:    normal size / contour, no ductal dilatation , no focal lesion  - GB:      grossly negative  - CBD:      grossly negative  - SPLEEN:    normal size and contour  - PANCREAS:    normal in size, contour, no focal mass  - VISCERA:    normal caliber, no wall thickening   - MESENTERY:  no mesenteric mass  - CAVITY:    no free abdominal fluid, no free intraperitoneal air  - BODY WALL:  wnl  - OSSEOUS:    grossly negative for age  - MISC:                                  RETROPERITONEUM:  - KIDNEYS:    normal size / contour, no collecting system dilation       no evidence of an enhancing mass  - URETERS:    normal course, caliber  - ADRENALS:    normal size, contour  - MISC:      no sig retroperitoneal adenopathy or mass  - VASCULAR:    aorta / iliacs: wnl for age  - - - CT PELVIS - - -  - VISCERA:    normal caliber small/large bowel, no focal thickening/mass      - APPENDIX:          wnl  - MESENTERY:  no mass  - VASCULAR:    wnl for age  - CAVITY:    no free fluid / air  - BLADDER:    unremarkable  - OSSEOUS:    wnl  - MISC:  - PROSTATE:  grossly wnl  .       Impression:  CONCLUSION: 1. Negative examination. 2. Negative appendix.  Electronically signed by:  RADHA Jaquez MD  9/7/2017 2:07 PM CDT Workstation: [id number]    Xr Chest 1 View    Result Date: 9/7/2017  Narrative: PROCEDURE: Single chest view AP REASON FOR EXAM:EPIGASTRIC PAIN FINDINGS: Comparison exam dated October 16, 2014. Cardiac and pulmonary vasculature are normal. Lungs are clear. Pleural spaces are normal. No acute osseous abnormality.     Impression: Negative single view chest  Electronically signed by:  Charbel Julio MD  9/7/2017 11:07 AM CDT Workstation: [id number]                [facility]  Number of Diagnoses or Management Options  Right lower quadrant abdominal pain:       Final diagnoses:   Right lower quadrant abdominal pain            GUERITA Burns  09/08/17 [id number]

## 2019-09-02 ENCOUNTER — APPOINTMENT (OUTPATIENT)
Dept: GENERAL RADIOLOGY | Facility: HOSPITAL | Age: 44
End: 2019-09-02

## 2019-09-02 ENCOUNTER — APPOINTMENT (OUTPATIENT)
Dept: CT IMAGING | Facility: HOSPITAL | Age: 44
End: 2019-09-02

## 2019-09-02 ENCOUNTER — HOSPITAL ENCOUNTER (EMERGENCY)
Facility: HOSPITAL | Age: 44
Discharge: HOME | End: 2019-09-02
Attending: FAMILY MEDICINE | Admitting: FAMILY MEDICINE

## 2019-09-02 VITALS
WEIGHT: 229.1 LBS | RESPIRATION RATE: 18 BRPM | OXYGEN SATURATION: 99 % | BODY MASS INDEX: 32.8 KG/M2 | SYSTOLIC BLOOD PRESSURE: 146 MMHG | HEIGHT: 70 IN | DIASTOLIC BLOOD PRESSURE: 95 MMHG | TEMPERATURE: 98.1 F | HEART RATE: 52 BPM

## 2019-09-02 DIAGNOSIS — R10.12 LEFT UPPER QUADRANT PAIN: Primary | ICD-10-CM

## 2019-09-02 LAB
ALBUMIN SERPL-MCNC: 4.2 G/DL (ref 3.5–5.2)
ALBUMIN/GLOB SERPL: 1.3 G/DL
ALP SERPL-CCNC: 63 U/L (ref 39–117)
ALT SERPL W P-5'-P-CCNC: 25 U/L (ref 1–41)
ANION GAP SERPL CALCULATED.3IONS-SCNC: 13 MMOL/L (ref 5–15)
AST SERPL-CCNC: 20 U/L (ref 1–40)
BASOPHILS # BLD AUTO: 0.04 10*3/MM3 (ref 0–0.2)
BASOPHILS NFR BLD AUTO: 0.6 % (ref 0–1.5)
BILIRUB SERPL-MCNC: 0.6 MG/DL (ref 0.2–1.2)
BUN BLD-MCNC: 12 MG/DL (ref 6–20)
BUN/CREAT SERPL: 12.4 (ref 7–25)
CALCIUM SPEC-SCNC: 9.3 MG/DL (ref 8.6–10.5)
CHLORIDE SERPL-SCNC: 98 MMOL/L (ref 98–107)
CO2 SERPL-SCNC: 29 MMOL/L (ref 22–29)
CREAT BLD-MCNC: 0.97 MG/DL (ref 0.76–1.27)
D-DIMER, QUANTITATIVE (MAD,POW, STR): 469 NG/ML (FEU) (ref 0–470)
DEPRECATED RDW RBC AUTO: 41.9 FL (ref 37–54)
EOSINOPHIL # BLD AUTO: 0.25 10*3/MM3 (ref 0–0.4)
EOSINOPHIL NFR BLD AUTO: 3.4 % (ref 0.3–6.2)
ERYTHROCYTE [DISTWIDTH] IN BLOOD BY AUTOMATED COUNT: 13.2 % (ref 12.3–15.4)
GFR SERPL CREATININE-BSD FRML MDRD: 84 ML/MIN/1.73
GLOBULIN UR ELPH-MCNC: 3.2 GM/DL
GLUCOSE BLD-MCNC: 91 MG/DL (ref 65–99)
HCT VFR BLD AUTO: 41.3 % (ref 37.5–51)
HGB BLD-MCNC: 14.7 G/DL (ref 13–17.7)
HOLD SPECIMEN: NORMAL
IMM GRANULOCYTES # BLD AUTO: 0.02 10*3/MM3 (ref 0–0.05)
IMM GRANULOCYTES NFR BLD AUTO: 0.3 % (ref 0–0.5)
LIPASE SERPL-CCNC: 14 U/L (ref 13–60)
LYMPHOCYTES # BLD AUTO: 2.68 10*3/MM3 (ref 0.7–3.1)
LYMPHOCYTES NFR BLD AUTO: 37 % (ref 19.6–45.3)
MCH RBC QN AUTO: 30.9 PG (ref 26.6–33)
MCHC RBC AUTO-ENTMCNC: 35.6 G/DL (ref 31.5–35.7)
MCV RBC AUTO: 86.9 FL (ref 79–97)
MONOCYTES # BLD AUTO: 0.42 10*3/MM3 (ref 0.1–0.9)
MONOCYTES NFR BLD AUTO: 5.8 % (ref 5–12)
NEUTROPHILS # BLD AUTO: 3.84 10*3/MM3 (ref 1.7–7)
NEUTROPHILS NFR BLD AUTO: 52.9 % (ref 42.7–76)
NRBC BLD AUTO-RTO: 0 /100 WBC (ref 0–0.2)
PLATELET # BLD AUTO: 277 10*3/MM3 (ref 140–450)
PMV BLD AUTO: 10.2 FL (ref 6–12)
POTASSIUM BLD-SCNC: 3 MMOL/L (ref 3.5–5.2)
PROT SERPL-MCNC: 7.4 G/DL (ref 6–8.5)
RBC # BLD AUTO: 4.75 10*6/MM3 (ref 4.14–5.8)
SODIUM BLD-SCNC: 140 MMOL/L (ref 136–145)
WBC NRBC COR # BLD: 7.25 10*3/MM3 (ref 3.4–10.8)

## 2019-09-02 PROCEDURE — 99284 EMERGENCY DEPT VISIT MOD MDM: CPT

## 2019-09-02 PROCEDURE — 36415 COLL VENOUS BLD VENIPUNCTURE: CPT

## 2019-09-02 PROCEDURE — 80053 COMPREHEN METABOLIC PANEL: CPT | Performed by: PHYSICIAN ASSISTANT

## 2019-09-02 PROCEDURE — 85025 COMPLETE CBC W/AUTO DIFF WBC: CPT | Performed by: PHYSICIAN ASSISTANT

## 2019-09-02 PROCEDURE — 93010 ELECTROCARDIOGRAM REPORT: CPT | Performed by: INTERNAL MEDICINE

## 2019-09-02 PROCEDURE — 25010000002 IOPAMIDOL 61 % SOLUTION: Performed by: FAMILY MEDICINE

## 2019-09-02 PROCEDURE — 71046 X-RAY EXAM CHEST 2 VIEWS: CPT

## 2019-09-02 PROCEDURE — 93005 ELECTROCARDIOGRAM TRACING: CPT | Performed by: PHYSICIAN ASSISTANT

## 2019-09-02 PROCEDURE — 74177 CT ABD & PELVIS W/CONTRAST: CPT

## 2019-09-02 PROCEDURE — 85379 FIBRIN DEGRADATION QUANT: CPT | Performed by: PHYSICIAN ASSISTANT

## 2019-09-02 PROCEDURE — 83690 ASSAY OF LIPASE: CPT | Performed by: PHYSICIAN ASSISTANT

## 2019-09-02 RX ORDER — DICYCLOMINE HYDROCHLORIDE 10 MG/1
10 CAPSULE ORAL EVERY 8 HOURS PRN
Qty: 15 CAPSULE | Refills: 0 | Status: SHIPPED | OUTPATIENT
Start: 2019-09-02 | End: 2019-09-07

## 2019-09-02 RX ORDER — HYDROCODONE BITARTRATE AND ACETAMINOPHEN 10; 325 MG/1; MG/1
1 TABLET ORAL EVERY 6 HOURS PRN
COMMUNITY
End: 2019-12-13

## 2019-09-02 RX ORDER — POTASSIUM CHLORIDE 750 MG/1
40 CAPSULE, EXTENDED RELEASE ORAL ONCE
Status: COMPLETED | OUTPATIENT
Start: 2019-09-02 | End: 2019-09-02

## 2019-09-02 RX ORDER — FEXOFENADINE HCL AND PSEUDOEPHEDRINE HCI 180; 240 MG/1; MG/1
1 TABLET, EXTENDED RELEASE ORAL DAILY
COMMUNITY
End: 2019-12-02 | Stop reason: SDDI

## 2019-09-02 RX ADMIN — POTASSIUM CHLORIDE 40 MEQ: 750 CAPSULE, EXTENDED RELEASE ORAL at 17:05

## 2019-09-02 RX ADMIN — IOPAMIDOL 93 ML: 612 INJECTION, SOLUTION INTRAVENOUS at 15:38

## 2019-09-02 NOTE — ED NOTES
"Spoke to pt r/t stay r/t elevated blood pressure.  Pt verbalized that \"I'm going home!  I'm not going to stay any longer\".  Pt instructed to return as needed.       Angelika Galindo RN  09/02/19 [id number]    "

## 2019-09-02 NOTE — ED PROVIDER NOTES
"Subjective   Pt reports L anterior rib pain started 7 days ago and now in the ED due to worsening symptoms. Pt states \"It's not my rib, it is more inside.\" Denies any chest pain, shortness of breath, N/V/D. Denies any prolonged travel, surgery, or immobilization.        History provided by:  Patient   used: No    Abdominal Pain   Pain location:  LUQ  Pain quality: aching    Pain radiates to:  Does not radiate  Pain severity:  Mild  Onset quality:  Gradual  Duration:  7 days  Timing:  Constant  Relieved by:  Nothing  Worsened by:  Nothing  Associated symptoms: no cough, no fatigue, no shortness of breath and no vomiting        Review of Systems   Constitutional: Negative for fatigue.   HENT: Negative for congestion.    Respiratory: Negative for cough and shortness of breath.    Gastrointestinal: Positive for abdominal pain. Negative for vomiting.   Endocrine: Negative for polyuria.   Musculoskeletal: Positive for arthralgias.   Skin: Negative for color change.   Neurological: Negative for syncope.   Psychiatric/Behavioral: Negative for agitation.       Past Medical History:   Diagnosis Date   • Asthma    • Back pain    • Backache    • Benign hypertension    • GERD (gastroesophageal reflux disease)    • Headache    • Hypertension    • Migraine    • Osteoarthritis of multiple joints    • Paresthesia of both hands    • Seasonal allergies    • Tobacco dependence syndrome        Allergies   Allergen Reactions   • Codeine    • Diphenhydramine Other (See Comments)     \"gets cranky\"   • Phenergan [Promethazine Hcl] Nausea And Vomiting   • Promethazine Nausea And Vomiting       Past Surgical History:   Procedure Laterality Date   • DENTAL PROCEDURE         Family History   Problem Relation Age of Onset   • Hypertension Mother    • Brain cancer Father    • Skin cancer Paternal Grandfather    • Cancer Other    • Heart disease Other    • Osteoarthritis Other        Social History     Socioeconomic History   • " Marital status:      Spouse name: Not on file   • Number of children: Not on file   • Years of education: Not on file   • Highest education level: Not on file   Tobacco Use   • Smoking status: Current Every Day Smoker     Packs/day: 0.50     Years: 7.00     Pack years: 3.50     Types: Cigarettes   • Smokeless tobacco: Current User     Types: Chew   • Tobacco comment: 1*800*quit*now   Substance and Sexual Activity   • Alcohol use: Yes     Comment: occassionally   • Drug use: No   • Sexual activity: Defer           Objective   Physical Exam   Constitutional: He is oriented to person, place, and time. He appears well-developed and well-nourished.   HENT:   Head: Normocephalic.   Right Ear: Hearing normal.   Left Ear: Hearing normal.   Nose: Nose normal.   Eyes: Conjunctivae, EOM and lids are normal.   Neck: Trachea normal and full passive range of motion without pain.   Cardiovascular: Regular rhythm, S1 normal, S2 normal, normal heart sounds and normal pulses.   Pulmonary/Chest: Effort normal and breath sounds normal.   Abdominal: Normal appearance and bowel sounds are normal.   Neurological: He is alert and oriented to person, place, and time. He is not disoriented.   Skin: Skin is warm and dry. He is not diaphoretic.   Psychiatric: He has a normal mood and affect. His speech is normal and behavior is normal. Thought content normal.   Nursing note and vitals reviewed.      Procedures         Labs Reviewed   COMPREHENSIVE METABOLIC PANEL - Abnormal; Notable for the following components:       Result Value    Potassium 3.0 (*)     All other components within normal limits    Narrative:     GFR Normal >60  Chronic Kidney Disease <60  Kidney Failure <15   LIPASE - Normal   D-DIMER, QUANTITATIVE - Normal    Narrative:     Dimer values <500 ng/ml FEU are FDA approved as aid in diagnosis of deep venous thrombosis and pulmonary embolism.  This test should not be used in an exclusion strategy with pretest probability  alone.    A recent guideline regarding diagnosis for pulmonary thromboembolism recommends an adjusted exclusion criterion of age x 10 ng/ml FEU for patients >50 years of age ([employer] Intern Med 2015; 163: 701-711).   CBC WITH AUTO DIFFERENTIAL - Normal   CBC AND DIFFERENTIAL    Narrative:     The following orders were created for panel order CBC & Differential.  Procedure                               Abnormality         Status                     ---------                               -----------         ------                     CBC Auto Differential[[id number]]        Normal              Final result                 Please view results for these tests on the individual orders.   EXTRA TUBES    Narrative:     The following orders were created for panel order Extra Tubes.  Procedure                               Abnormality         Status                     ---------                               -----------         ------                     Gold Top - SST[[id number]]                                   Final result                 Please view results for these tests on the individual orders.   GOLD TOP - SST       CT Abdomen Pelvis With Contrast   Final Result    CONCLUSION:   1. No evidence of an acute intra-abdominal/pelvic process.           Electronically signed by:  RADHA Jaquez MD  9/2/2019 4:18 PM   CDT Workstation: [id number]      XR Chest PA & Lateral   Final Result   CONCLUSION:   Normal chest      [id number]      Electronically signed by:  Deng Braswell MD  9/2/2019 1:17 PM CDT   Workstation: [id number]              ED Course      4:24P  Rechecked pt and informed unremarkable results. Will d/c pt on bentyl and voltaren. Advised to f/u with PCP. Pt agrees with plan of care and all questions addressed at this time.             MDM      Final diagnoses:   Left upper quadrant pain            Jackelyn Myles PA-C  09/02/19 [id number]

## 2019-09-02 NOTE — ED NOTES
"Pt is presented to ED with c/o pain under left rib.  Pt states \"It';s nor on my ribs....it's under....like on the inside\".  Pt states this started approximately one week ago.     Angelika Galindo RN  09/02/19 [id number]    "

## 2019-11-18 ENCOUNTER — OFFICE VISIT (OUTPATIENT)
Dept: GASTROENTEROLOGY | Facility: CLINIC | Age: 44
End: 2019-11-18

## 2019-11-18 VITALS
BODY MASS INDEX: 32.01 KG/M2 | HEART RATE: 75 BPM | DIASTOLIC BLOOD PRESSURE: 89 MMHG | SYSTOLIC BLOOD PRESSURE: 124 MMHG | WEIGHT: 223.6 LBS | HEIGHT: 70 IN

## 2019-11-18 DIAGNOSIS — K21.9 GASTROESOPHAGEAL REFLUX DISEASE, ESOPHAGITIS PRESENCE NOT SPECIFIED: ICD-10-CM

## 2019-11-18 DIAGNOSIS — R10.12 LUQ PAIN: Primary | ICD-10-CM

## 2019-11-18 PROCEDURE — 99204 OFFICE O/P NEW MOD 45 MIN: CPT | Performed by: INTERNAL MEDICINE

## 2019-11-18 RX ORDER — IBUPROFEN 800 MG/1
TABLET ORAL AS NEEDED
COMMUNITY
Start: 2019-10-23

## 2019-11-18 RX ORDER — DEXTROSE AND SODIUM CHLORIDE 5; .45 G/100ML; G/100ML
30 INJECTION, SOLUTION INTRAVENOUS CONTINUOUS PRN
Status: CANCELLED | OUTPATIENT
Start: 2019-12-05

## 2019-11-18 RX ORDER — SUCRALFATE 1 G/1
1 TABLET ORAL 4 TIMES DAILY
Qty: 120 TABLET | Refills: 5 | Status: SHIPPED | OUTPATIENT
Start: 2019-11-18 | End: 2019-12-02

## 2019-11-18 NOTE — PATIENT INSTRUCTIONS
Abdominal Pain, Adult  Abdominal pain can be caused by many things. Often, abdominal pain is not serious and it gets better with no treatment or by being treated at home. However, sometimes abdominal pain is serious. Your health care provider will do a medical history and a physical exam to try to determine the cause of your abdominal pain.  Follow these instructions at home:  · Take over-the-counter and prescription medicines only as told by your health care provider. Do not take a laxative unless told by your health care provider.  · Drink enough fluid to keep your urine clear or pale yellow.  · Watch your condition for any changes.  · Keep all follow-up visits as told by your health care provider. This is important.  Contact a health care provider if:  · Your abdominal pain changes or gets worse.  · You are not hungry or you lose weight without trying.  · You are constipated or have diarrhea for more than 2-3 days.  · You have pain when you urinate or have a bowel movement.  · Your abdominal pain wakes you up at night.  · Your pain gets worse with meals, after eating, or with certain foods.  · You are throwing up and cannot keep anything down.  · You have a fever.  Get help right away if:  · Your pain does not go away as soon as your health care provider told you to expect.  · You cannot stop throwing up.  · Your pain is only in areas of the abdomen, such as the right side or the left lower portion of the abdomen.  · You have bloody or black stools, or stools that look like tar.  · You have severe pain, cramping, or bloating in your abdomen.  · You have signs of dehydration, such as:  ? Dark urine, very little urine, or no urine.  ? Cracked lips.  ? Dry mouth.  ? Sunken eyes.  ? Sleepiness.  ? Weakness.  This information is not intended to replace advice given to you by your health care provider. Make sure you discuss any questions you have with your health care provider.  Document Released: 09/27/2006 Document  "Revised: 07/07/2017 Document Reviewed: 05/31/2017  [employer] Interactive Patient Education © 2019 [employer]  BMI for Adults    Body mass index (BMI) is a number that is calculated from a person's weight and height. BMI may help to estimate how much of a person's weight is composed of fat. BMI can help identify those who may be at higher risk for certain medical problems.  How is BMI used with adults?  BMI is used as a screening tool to identify possible weight problems. It is used to check whether a person is obese, overweight, healthy weight, or underweight.  How is BMI calculated?  BMI measures your weight and compares it to your height. This can be done either in English ([place] or metric measurements. Note that charts are available to help you find your BMI quickly and easily without having to do these calculations yourself.  To calculate your BMI in English ([place] measurements, your health care provider will:  1. Measure your weight in pounds (lb).  2. Multiply the number of pounds by 703.  ? For example, for a person who weighs 180 lb, multiply that number by 703, which equals 126,540.  3. Measure your height in inches (in). Then multiply that number by itself to get a measurement called \"inches squared.\"  ? For example, for a person who is 70 in tall, the \"inches squared\" measurement is 70 in x 70 in, which equals 4900 inches squared.  4. Divide the total from Step 2 (number of lb x 703) by the total from Step 3 (inches squared): 126,540 ÷ 4900 = 25.8. This is your BMI.  To calculate your BMI in metric measurements, your health care provider will:  1. Measure your weight in kilograms (kg).  2. Measure your height in meters (m). Then multiply that number by itself to get a measurement called \"meters squared.\"  ? For example, for a person who is 1.75 m tall, the \"meters squared\" measurement is 1.75 m x 1.75 m, which is equal to 3.1 meters squared.  3. Divide the number of kilograms (your weight) by the meters " squared number. In this example: 70 ÷ 3.1 = 22.6. This is your BMI.  How is BMI interpreted?  To interpret your results, your health care provider will use BMI charts to identify whether you are underweight, normal weight, overweight, or obese. The following guidelines will be used:  · Underweight: BMI less than 18.5.  · Normal weight: BMI between 18.5 and 24.9.  · Overweight: BMI between 25 and 29.9.  · Obese: BMI of 30 and above.  Please note:  · Weight includes both fat and muscle, so someone with a muscular build, such as an athlete, may have a BMI that is higher than 24.9. In cases like these, BMI is not an accurate measure of body fat.  · To determine if excess body fat is the cause of a BMI of 25 or higher, further assessments may need to be done by a health care provider.  · BMI is usually interpreted in the same way for men and women.  Why is BMI a useful tool?  BMI is useful in two ways:  · Identifying a weight problem that may be related to a medical condition, or that may increase the risk for medical problems.  · Promoting lifestyle and diet changes in order to reach a healthy weight.  Summary  · Body mass index (BMI) is a number that is calculated from a person's weight and height.  · BMI may help to estimate how much of a person's weight is composed of fat. BMI can help identify those who may be at higher risk for certain medical problems.  · BMI can be measured using English measurements or metric measurements.  · To interpret your results, your health care provider will use BMI charts to identify whether you are underweight, normal weight, overweight, or obese.  This information is not intended to replace advice given to you by your health care provider. Make sure you discuss any questions you have with your health care provider.  Document Released: 08/29/2005 Document Revised: 10/31/2018 Document Reviewed: 10/31/2018  [employer] Interactive Patient Education © 2019 [employer]

## 2019-11-19 NOTE — PROGRESS NOTES
[facility]      Chief Complaint:   Chief Complaint   Patient presents with   • Heartburn   • Abdominal Pain       Subjective     HPI:   Mr. Izaguirre is a 44-year-old  male with past medical history of hypertension, back pain, asthma, osteoarthritis, migraine presenting for evaluation for left upper quadrant abdominal pain.  He has intermittent bouts of sharp and stabbing left upper quadrant abdominal pain for past 3 months without any associated nausea, vomiting, diarrhea, constipation, rectal bleeding or weight loss.  Pain is unchanged with food intake, activity or defecation.  He also has GERD symptoms for past several years and has been on Prilosec with good control of symptomatology.  He was seen at the emergency room during the Labor Day weekend and CT abdomen and pelvis was unremarkable.  He takes naproxen and Celebrex on as-needed basis and ibuprofen on regular basis.    Past Medical History:   Past Medical History:   Diagnosis Date   • Asthma    • Back pain    • Backache    • Benign hypertension    • GERD (gastroesophageal reflux disease)    • Headache    • Hypertension    • Migraine    • Osteoarthritis of multiple joints    • Paresthesia of both hands    • Seasonal allergies    • Tobacco dependence syndrome        Past Surgical History:  Past Surgical History:   Procedure Laterality Date   • COLONOSCOPY  2008   • DENTAL PROCEDURE         Family History:  Family History   Problem Relation Age of Onset   • Hypertension Mother    • Brain cancer Father    • Skin cancer Paternal Grandfather    • Cancer Other    • Heart disease Other    • Osteoarthritis Other        Social History:   reports that he has been smoking cigarettes.  He has a 31.00 pack-year smoking history. His smokeless tobacco use includes chew. He reports that he drinks alcohol. He reports that he does not use drugs.    Medications:   Prior to Admission medications    Medication Sig Start Date End Date Taking?  Authorizing Provider   fexofenadine-pseudoephedrine (ALLEGRA-D 24) 180-240 MG per 24 hr tablet Take 1 tablet by mouth Daily.   Yes Supriya Pineda MD   fluticasone (FLONASE) 50 MCG/ACT nasal spray 1 spray into each nostril Daily. 4/10/17  Yes Diana Andino MD   hydrochlorothiazide (HYDRODIURIL) 25 MG tablet Take 25 mg by mouth. 8/7/17  Yes Supriya Pineda MD   HYDROcodone-acetaminophen (NORCO)  MG per tablet Take 1 tablet by mouth Every 6 (Six) Hours As Needed for Moderate Pain . 1 tablet every 4-6 hours.   Yes Supriya Pineda MD   ondansetron ODT (ZOFRAN-ODT) 4 MG disintegrating tablet Take 1 tablet by mouth Every 6 (Six) Hours As Needed for Nausea or Vomiting. 9/7/17  Yes Cristina Dawson PA   SUMAtriptan (IMITREX) 50 MG tablet Take 50 mg by mouth. 6/8/17  Yes Supriya Pineda MD   triamcinolone (KENALOG) 0.025 % ointment Apply to affected area prn 8/7/17  Yes Supriya Pineda MD   VENTOLIN  (90 BASE) MCG/ACT inhaler Inhale 2 puffs Every 4 (Four) Hours As Needed for Wheezing. 4/10/17  Yes Diana Andino MD   amitriptyline (ELAVIL) 10 MG tablet Take 1 tablet by mouth Every Night. 4/10/17   Diana Andino MD   celecoxib (CELEBREX) 100 MG capsule Take 2 capsules by mouth 2 (Two) Times a Day. 4/27/17   Diana Andino MD   fluticasone (FLONASE) 50 MCG/ACT nasal spray 1 spray into each nostril. 8/7/17   Supriya Pineda MD   gabapentin (NEURONTIN) 100 MG capsule Take 3 capsules by mouth 3 (Three) Times a Day. 4/10/17   Diana Andino MD   gabapentin (NEURONTIN) 600 MG tablet Take 600 mg by mouth. 8/7/17   Supriya Pineda MD   hydrochlorothiazide (MICROZIDE) 12.5 MG capsule Take 1 capsule by mouth Every Morning. 4/10/17   Diana Andino MD   HYDROcodone-acetaminophen (NORCO) 7.5-325 MG per tablet TAKE 1 TABLET TWICE DAILY AS NEEDED 3/13/17   Provider, MD Supriya   HYDROcodone-acetaminophen (NORCO) 7.5-325 MG per tablet Take 1 tablet by mouth. 8/7/17    "Supriya Pineda MD   ibuprofen (ADVIL,MOTRIN) 800 MG tablet  10/23/19   Supriya Pineda MD   loratadine (CLARITIN) 10 MG tablet Take 1 tablet by mouth Daily. 4/10/17   Diana Andino MD   loratadine (CLARITIN) 10 MG tablet Take 10 mg by mouth. 8/7/17   Supriya Pineda MD   naproxen (NAPROSYN) 500 MG tablet Take 500 mg by mouth. 8/7/17   Supriya Pineda MD   sucralfate (CARAFATE) 1 g tablet Take 1 tablet by mouth 4 (Four) Times a Day. 11/18/19   Karen Sheikh MD       Allergies:  Codeine; Diphenhydramine; Phenergan [promethazine hcl]; and Promethazine    ROS:    Review of Systems   Constitutional: Negative for chills, fatigue, fever and unexpected weight change.   HENT: Negative for congestion, ear discharge, hearing loss, nosebleeds and sore throat.    Eyes: Negative for pain, discharge and redness.   Respiratory: Negative for cough, chest tightness, shortness of breath and wheezing.    Cardiovascular: Negative for chest pain and palpitations.   Gastrointestinal: Positive for abdominal pain. Negative for abdominal distention, blood in stool, constipation, diarrhea, nausea and vomiting.   Endocrine: Negative for cold intolerance, polydipsia, polyphagia and polyuria.   Genitourinary: Negative for dysuria, flank pain, frequency, hematuria and urgency.   Musculoskeletal: Negative for arthralgias, back pain, joint swelling and myalgias.   Skin: Negative for color change, pallor and rash.   Neurological: Negative for tremors, seizures, syncope, weakness and headaches.   Hematological: Negative for adenopathy. Does not bruise/bleed easily.   Psychiatric/Behavioral: Negative for behavioral problems, confusion, dysphoric mood, hallucinations and suicidal ideas. The patient is not nervous/anxious.    Has GERD.  Objective     Blood pressure 124/89, pulse 75, height 176.5 cm (69.5\"), weight 101 kg (223 lb 9.6 oz).    Physical Exam   Constitutional: He is oriented to person, place, and time. He " appears well-developed and well-nourished.   HENT:   Head: Normocephalic and atraumatic.   Mouth/Throat: Oropharynx is clear and moist.   Eyes: Conjunctivae and EOM are normal. Pupils are equal, round, and reactive to light.   Neck: Normal range of motion. Neck supple. No thyromegaly present.   Cardiovascular: Normal rate, regular rhythm and normal heart sounds.   No murmur heard.  Pulmonary/Chest: Effort normal and breath sounds normal. He has no wheezes.   Abdominal: Soft. Bowel sounds are normal. He exhibits no distension and no mass. There is no tenderness. No hernia.   Genitourinary:   Genitourinary Comments: No lesions noted   Musculoskeletal: Normal range of motion. He exhibits no edema or tenderness.   Lymphadenopathy:     He has no cervical adenopathy.   Neurological: He is alert and oriented to person, place, and time. No cranial nerve deficit.   Skin: Skin is warm and dry. No rash noted.   Psychiatric: He has a normal mood and affect. Thought content normal.      Extremities: No edema, cyanosis or clubbing.  Tenderness noted in the costochondral joints of the left floating ribs.  Assessment/Plan   Marko was seen today for heartburn and abdominal pain.    Diagnoses and all orders for this visit:    LUQ pain  -     Case Request; Standing  -     dextrose 5 % and sodium chloride 0.45 % infusion  -     Case Request    Gastroesophageal reflux disease, esophagitis presence not specified  -     Case Request; Standing  -     dextrose 5 % and sodium chloride 0.45 % infusion  -     Case Request    Other orders  -     Follow Anesthesia Guidelines / Standing Orders; Future  -     Obtain Informed Consent; Future  -     Implement Anesthesia Orders Day of Procedure; Standing  -     Obtain Informed Consent; Standing  -     POC Glucose Once; Standing  -     sucralfate (CARAFATE) 1 g tablet; Take 1 tablet by mouth 4 (Four) Times a Day.    1. Upper quadrant pain likely musculoskeletal due to costochondritis.  Could also be  due to gastritis and peptic ulcer disease given the history of NSAID usage.  Symptoms are poorly responsive to PPI therapy.  Add Carafate and proceed with EGD for further evaluation.  Discontinue NSAIDs.  3.  GERD, symptoms are well controlled with Prilosec.  Need to rule out Tellez's given the long-standing nature of symptoms.  Continue PPI and antireflux lifestyle.  3.  Obesity, recommend exercise and diet control.    ESOPHAGOGASTRODUODENOSCOPY (N/A)     Diagnosis Plan   1. LUQ pain  Case Request    dextrose 5 % and sodium chloride 0.45 % infusion    Case Request   2. Gastroesophageal reflux disease, esophagitis presence not specified  Case Request    dextrose 5 % and sodium chloride 0.45 % infusion    Case Request       Anticipated Surgical Procedure:  Orders Placed This Encounter   Procedures   • Follow Anesthesia Guidelines / Standing Orders     Standing Status:   Future   • Obtain Informed Consent     Standing Status:   Future     Order Specific Question:   Informed Consent Given For     Answer:   ESOPHAGOGASTRODUODENOSCOPY       The risks, benefits, and alternatives of this procedure have been discussed with the patient or the responsible party- the patient understands and agrees to proceed.            This document has been electronically signed by Karen Sheikh MD on November 18, 2019 9:44 PM

## 2019-12-05 ENCOUNTER — ANESTHESIA (OUTPATIENT)
Dept: GASTROENTEROLOGY | Facility: HOSPITAL | Age: 44
End: 2019-12-05

## 2019-12-05 ENCOUNTER — HOSPITAL ENCOUNTER (OUTPATIENT)
Facility: HOSPITAL | Age: 44
Setting detail: HOSPITAL OUTPATIENT SURGERY
Discharge: HOME | End: 2019-12-05
Attending: INTERNAL MEDICINE | Admitting: INTERNAL MEDICINE

## 2019-12-05 ENCOUNTER — ANESTHESIA EVENT (OUTPATIENT)
Dept: GASTROENTEROLOGY | Facility: HOSPITAL | Age: 44
End: 2019-12-05

## 2019-12-05 VITALS
HEART RATE: 62 BPM | DIASTOLIC BLOOD PRESSURE: 63 MMHG | WEIGHT: 212 LBS | TEMPERATURE: 98.1 F | BODY MASS INDEX: 30.35 KG/M2 | SYSTOLIC BLOOD PRESSURE: 105 MMHG | RESPIRATION RATE: 20 BRPM | OXYGEN SATURATION: 97 % | HEIGHT: 70 IN

## 2019-12-05 DIAGNOSIS — K21.9 GASTROESOPHAGEAL REFLUX DISEASE, ESOPHAGITIS PRESENCE NOT SPECIFIED: ICD-10-CM

## 2019-12-05 DIAGNOSIS — R10.12 LUQ PAIN: ICD-10-CM

## 2019-12-05 PROCEDURE — 88305 TISSUE EXAM BY PATHOLOGIST: CPT | Performed by: PATHOLOGY

## 2019-12-05 PROCEDURE — 88342 IMHCHEM/IMCYTCHM 1ST ANTB: CPT | Performed by: PATHOLOGY

## 2019-12-05 PROCEDURE — 25010000002 PROPOFOL 10 MG/ML EMULSION: Performed by: NURSE ANESTHETIST, CERTIFIED REGISTERED

## 2019-12-05 PROCEDURE — 43239 EGD BIOPSY SINGLE/MULTIPLE: CPT | Performed by: INTERNAL MEDICINE

## 2019-12-05 PROCEDURE — 88305 TISSUE EXAM BY PATHOLOGIST: CPT | Performed by: INTERNAL MEDICINE

## 2019-12-05 PROCEDURE — 88342 IMHCHEM/IMCYTCHM 1ST ANTB: CPT | Performed by: INTERNAL MEDICINE

## 2019-12-05 RX ORDER — DEXTROSE AND SODIUM CHLORIDE 5; .45 G/100ML; G/100ML
30 INJECTION, SOLUTION INTRAVENOUS CONTINUOUS PRN
Status: DISCONTINUED | OUTPATIENT
Start: 2019-12-05 | End: 2019-12-05 | Stop reason: HOSPADM

## 2019-12-05 RX ORDER — LIDOCAINE HYDROCHLORIDE 20 MG/ML
INJECTION, SOLUTION EPIDURAL; INFILTRATION; INTRACAUDAL; PERINEURAL AS NEEDED
Status: DISCONTINUED | OUTPATIENT
Start: 2019-12-05 | End: 2019-12-05 | Stop reason: SURG

## 2019-12-05 RX ORDER — PROPOFOL 10 MG/ML
VIAL (ML) INTRAVENOUS AS NEEDED
Status: DISCONTINUED | OUTPATIENT
Start: 2019-12-05 | End: 2019-12-05 | Stop reason: SURG

## 2019-12-05 RX ADMIN — PROPOFOL 30 MG: 10 INJECTION, EMULSION INTRAVENOUS at 16:46

## 2019-12-05 RX ADMIN — PROPOFOL 150 MG: 10 INJECTION, EMULSION INTRAVENOUS at 16:43

## 2019-12-05 RX ADMIN — LIDOCAINE HYDROCHLORIDE 100 MG: 20 INJECTION, SOLUTION EPIDURAL; INFILTRATION; INTRACAUDAL at 16:43

## 2019-12-05 RX ADMIN — DEXTROSE AND SODIUM CHLORIDE 30 ML/HR: 5; 450 INJECTION, SOLUTION INTRAVENOUS at 15:36

## 2019-12-05 NOTE — ANESTHESIA PREPROCEDURE EVALUATION
Anesthesia Evaluation     NPO Solid Status: > 8 hours  NPO Liquid Status: > 2 hours           Airway   Mallampati: III  TM distance: >3 FB  Neck ROM: full  Possible difficult intubation  Dental - normal exam     Pulmonary - normal exam   (+) a smoker Current Smoked day of surgery, asthma,  Cardiovascular - normal exam    (+) hypertension less than 2 medications,       Neuro/Psych  (+) headaches,     (-) numbness  GI/Hepatic/Renal/Endo    (+) obesity,  GERD well controlled,      Musculoskeletal     (+) back pain,   Abdominal  - normal exam   Substance History      OB/GYN          Other        (-) arthritis                  Anesthesia Plan    ASA 3     MAC     intravenous induction     Anesthetic plan, all risks, benefits, and alternatives have been provided, discussed and informed consent has been obtained with: patient.

## 2019-12-05 NOTE — ANESTHESIA POSTPROCEDURE EVALUATION
Patient: Marko Izaguirre    Procedure Summary     Date:  12/05/19 Room / Location:  [facility] ENDOSCOPY 1 / [facility] ENDOSCOPY    Anesthesia Start:  1638 Anesthesia Stop:  1653    Procedure:  ESOPHAGOGASTRODUODENOSCOPY (N/A ) Diagnosis:       LUQ pain      Gastroesophageal reflux disease, esophagitis presence not specified      (LUQ pain [R10.12])      (Gastroesophageal reflux disease, esophagitis presence not specified [K21.9])    Surgeon:  Karen Sheikh MD Provider:  Maddison Lopez CRNA    Anesthesia Type:  MAC ASA Status:  3          Anesthesia Type: MAC  Last vitals  BP   167/79 (12/05/19 1526)   Temp   98 °F (36.7 °C) (12/05/19 1526)   Pulse   68 (12/05/19 1526)   Resp   18 (12/05/19 1526)     SpO2   97 % (12/05/19 1526)     Post Anesthesia Care and Evaluation    Patient location during evaluation: bedside  Patient participation: waiting for patient participation  Level of consciousness: sleepy but conscious  Pain score: 0  Pain management: adequate  Airway patency: patent  Anesthetic complications: No anesthetic complications  PONV Status: none  Cardiovascular status: acceptable  Respiratory status: acceptable  Hydration status: acceptable

## 2019-12-09 LAB
LAB AP CASE REPORT: NORMAL
LAB AP SPECIAL STAINS: NORMAL
PATH REPORT.FINAL DX SPEC: NORMAL
PATH REPORT.GROSS SPEC: NORMAL

## 2019-12-13 ENCOUNTER — OFFICE VISIT (OUTPATIENT)
Dept: GASTROENTEROLOGY | Facility: CLINIC | Age: 44
End: 2019-12-13

## 2019-12-13 VITALS
SYSTOLIC BLOOD PRESSURE: 139 MMHG | BODY MASS INDEX: 33.47 KG/M2 | DIASTOLIC BLOOD PRESSURE: 91 MMHG | HEART RATE: 60 BPM | HEIGHT: 70 IN | WEIGHT: 233.8 LBS

## 2019-12-13 DIAGNOSIS — K21.00 GASTROESOPHAGEAL REFLUX DISEASE WITH ESOPHAGITIS: Primary | ICD-10-CM

## 2019-12-13 PROBLEM — K29.70 GASTRITIS: Status: ACTIVE | Noted: 2019-12-13

## 2019-12-13 PROCEDURE — 99213 OFFICE O/P EST LOW 20 MIN: CPT | Performed by: INTERNAL MEDICINE

## 2019-12-13 RX ORDER — LANSOPRAZOLE, AMOXICILLIN, CLARITHROMYCIN 30-500-500
KIT ORAL 2 TIMES DAILY
Qty: 14 KIT | Refills: 0 | Status: SHIPPED | OUTPATIENT
Start: 2019-12-13 | End: 2020-02-11

## 2019-12-13 NOTE — PROGRESS NOTES
Chief Complaint   Patient presents with   • Abdominal Pain       Subjective    Marko Izaguirre is a 44 y.o. male.    History of Present Illness    Patient presented to GI clinic for follow-up visit today.  Has continued symptoms with left upper quadrant abdominal pain. Denied nausea, vomiting, diarrhea, constipation, rectal bleeding or weight loss.  EGD was consistent with hiatal hernia, gastritis and esophagitis.  Path was consistent with H. pylori gastritis.     The following portions of the patient's history were reviewed and updated as appropriate:   Past Medical History:   Diagnosis Date   • Asthma    • Back pain    • Backache    • Benign hypertension    • GERD (gastroesophageal reflux disease)    • Headache    • Hypertension    • Migraine    • Osteoarthritis of multiple joints    • Paresthesia of both hands    • Seasonal allergies    • Tobacco dependence syndrome      Past Surgical History:   Procedure Laterality Date   • COLONOSCOPY  2008   • DENTAL PROCEDURE     • ENDOSCOPY N/A 12/5/2019    Procedure: ESOPHAGOGASTRODUODENOSCOPY;  Surgeon: Karen Sheikh MD;  Location: [facility] ENDOSCOPY;  Service: Gastroenterology     Family History   Problem Relation Age of Onset   • Hypertension Mother    • Brain cancer Father    • Skin cancer Paternal Grandfather    • Cancer Other    • Heart disease Other    • Osteoarthritis Other        Prior to Admission medications    Medication Sig Start Date End Date Taking? Authorizing Provider   fluticasone (FLONASE) 50 MCG/ACT nasal spray 1 spray into each nostril Daily. 4/10/17  Yes Diana Andino MD   hydrochlorothiazide (HYDRODIURIL) 25 MG tablet Take 25 mg by mouth. 8/7/17  Yes ProviderSupriya MD   ibuprofen (ADVIL,MOTRIN) 800 MG tablet  10/23/19  Yes ProviderSupriya MD   ondansetron ODT (ZOFRAN-ODT) 4 MG disintegrating tablet Take 1 tablet by mouth Every 6 (Six) Hours As Needed for Nausea or Vomiting. 9/7/17  Yes Cristina Dawson PA   SUMAtriptan (IMITREX)  "50 MG tablet Take 50 mg by mouth. 6/8/17  Yes Supriya Pineda MD   triamcinolone (KENALOG) 0.025 % ointment Apply to affected area prn 8/7/17  Yes Supriya Pineda MD   VENTOLIN  (90 BASE) MCG/ACT inhaler Inhale 2 puffs Every 4 (Four) Hours As Needed for Wheezing. 4/10/17  Yes Diana Andino MD   amoxicillin-clarithromycin-lansoprazole (PREVPAC) combo pack Take  by mouth 2 (Two) Times a Day. Follow package directions. 12/13/19   Karen Sheikh MD   HYDROcodone-acetaminophen (NORCO)  MG per tablet Take 1 tablet by mouth Every 6 (Six) Hours As Needed for Moderate Pain . 1 tablet every 4-6 hours.  12/13/19  ProviderSupriya MD     Allergies   Allergen Reactions   • Codeine    • Diphenhydramine Other (See Comments)     \"gets cranky\"   • Phenergan [Promethazine Hcl] Nausea And Vomiting   • Promethazine Nausea And Vomiting     Social History     Socioeconomic History   • Marital status:      Spouse name: Not on file   • Number of children: Not on file   • Years of education: Not on file   • Highest education level: Not on file   Tobacco Use   • Smoking status: Current Every Day Smoker     Packs/day: 1.00     Years: 31.00     Pack years: 31.00     Types: Cigarettes   • Smokeless tobacco: Current User     Types: Chew   • Tobacco comment: 1*800*quit*now   Substance and Sexual Activity   • Alcohol use: Yes     Comment: occassionally   • Drug use: No   • Sexual activity: Defer       Review of Systems  Review of Systems   Constitutional: Negative for chills, fatigue, fever and unexpected weight change.   HENT: Negative for congestion, ear discharge, hearing loss, nosebleeds and sore throat.    Eyes: Negative for pain, discharge and redness.   Respiratory: Negative for cough, chest tightness, shortness of breath and wheezing.    Cardiovascular: Negative for chest pain and palpitations.   Gastrointestinal: Positive for abdominal pain. Negative for abdominal distention, blood in stool, " "constipation, diarrhea and vomiting.   Endocrine: Negative for cold intolerance, polydipsia, polyphagia and polyuria.   Genitourinary: Negative for dysuria, flank pain, frequency, hematuria and urgency.   Musculoskeletal: Negative for arthralgias, back pain, joint swelling and myalgias.   Skin: Negative for color change, pallor and rash.   Neurological: Negative for tremors, seizures, syncope, weakness and headaches.   Hematological: Negative for adenopathy. Does not bruise/bleed easily.   Psychiatric/Behavioral: Negative for behavioral problems, confusion, dysphoric mood, hallucinations and suicidal ideas. The patient is not nervous/anxious.         /91 (BP Location: Left arm, Patient Position: Sitting)   Pulse 60   Ht 176.5 cm (69.5\")   Wt 106 kg (233 lb 12.8 oz)   BMI 34.03 kg/m²     Objective    Physical Exam   Constitutional: He is oriented to person, place, and time. He appears well-developed and well-nourished.   HENT:   Head: Normocephalic and atraumatic.   Mouth/Throat: Oropharynx is clear and moist.   Eyes: Pupils are equal, round, and reactive to light. Conjunctivae and EOM are normal.   Neck: Normal range of motion. Neck supple. No thyromegaly present.   Cardiovascular: Normal rate, regular rhythm and normal heart sounds.   No murmur heard.  Pulmonary/Chest: Effort normal and breath sounds normal. He has no wheezes.   Abdominal: Soft. Bowel sounds are normal. He exhibits no distension and no mass. There is no tenderness. No hernia.   Genitourinary:   Genitourinary Comments: No lesions noted   Musculoskeletal: Normal range of motion. He exhibits no edema or tenderness.   Lymphadenopathy:     He has no cervical adenopathy.   Neurological: He is alert and oriented to person, place, and time. No cranial nerve deficit.   Skin: Skin is warm and dry. No rash noted.   Psychiatric: He has a normal mood and affect. Thought content normal.     Admission on 12/05/2019, Discharged on 12/05/2019   Component " Date Value Ref Range Status   • Case Report 12/05/2019    Final                    Value:Surgical Pathology Report                         Case: [id number]                                  Authorizing Provider:  Karen Sheikh MD      Collected:           12/05/2019 04:48 PM          Ordering Location:     [facility]             Received:            12/06/2019 09:20 AM                                 [place]                                                     Pathologist:           Olayinka Sullivan MD                                                         Specimens:   1) - Gastric, Antrum                                                                                2) - Esophagus, eg junction                                                               • Final Diagnosis 12/05/2019    Final                    Value:This result contains rich text formatting which cannot be displayed here.   • Gross Description 12/05/2019    Final                    Value:This result contains rich text formatting which cannot be displayed here.   • Special Stains 12/05/2019    Final                    Value:This result contains rich text formatting which cannot be displayed here.     Assessment/Plan    No diagnosis found..   1. Upper quadrant pain likely musculoskeletal due to costochondritis.  Could also be due to H. pylori gastritis.  Add Prevpac for 2 weeks and continue PPI.  3.  GERD, symptoms are well controlled with Prilosec.  Continue PPI and antireflux lifestyle.  3.  Obesity, recommend exercise and diet control.    Orders placed during this encounter include:  No orders of the defined types were placed in this encounter.      * Surgery not found *    Review and/or summary of lab tests, radiology, procedures, medications. Review and summary of old records and obtaining of history. The risks and benefits of my recommendations, as well as other treatment options were discussed with the patient today.  "Questions were answered.    New Medications Ordered This Visit   Medications   • amoxicillin-clarithromycin-lansoprazole (PREVPAC) combo pack     Sig: Take  by mouth 2 (Two) Times a Day. Follow package directions.     Dispense:  14 kit     Refill:  0       Follow-up: Return in about 1 month (around 1/13/2020).               Results for orders placed or performed during the hospital encounter of 12/05/19   Tissue Pathology Exam   Result Value Ref Range    Case Report       Surgical Pathology Report                         Case: [id number]                                  Authorizing Provider:  Karen Sheikh MD      Collected:           12/05/2019 04:48 PM          Ordering Location:     [facility]             Received:            12/06/2019 09:20 AM                                 [facility]                                                     Pathologist:           Olayinka Sullivan MD                                                         Specimens:   1) - Gastric, Antrum                                                                                2) - Esophagus, eg junction                                                                Final Diagnosis       1.  MUCOSA, ANTRUM OF STOMACH:  CHRONIC GASTRITIS.  POSITIVE FOR HELICOBACTER PYLORI (HP IMMUNOSTAIN).    2.  MUCOSA, ESOPHAGOGASTRIC JUNCTION:  REACTIVE CHANGE OF SQUAMOUS MUCOSA AND CARDIAC GASTRIC MUCOSA.      Gross Description       1.  The first container is labeled \"antrum of stomach\" and has nodular bits of white soft tissue measuring 0.4 cc in aggregate.  The entire specimen is embedded as 1A.    2.  The second container is labeled \"esophagogastric junction\" and has nodular bits of white soft tissue measuring 0.4 cc in aggregate.  The entire specimen is embedded as 2A.      Special Stains       Helicobacter pylori (HP) immunostain is performed (Block 1) because an appropriate inflammatory milieu is present and organisms are not " seen on H & E stained slides.    HP immunostain was developed and its performance characteristics determined by [facility].  It has not been cleared or approved by the U.S. Food and Drug Administration.  The FDA has determined that such clearance or approval is not necessary.  This test is used for clinical purposes.  It should not be regarded as investigational or for research.  This laboratory is certified under the Clinical Laboratory Improvement Amendments of 1988 (CLIA-88) as qualified to perform high complexity clinical laboratory testing.    All controls show appropriate reactivity.         Results for orders placed or performed during the hospital encounter of 09/02/19   Gold Top - SST   Result Value Ref Range    Extra Tube Hold for add-ons.    CBC Auto Differential   Result Value Ref Range    WBC 7.25 3.40 - 10.80 10*3/mm3    RBC 4.75 4.14 - 5.80 10*6/mm3    Hemoglobin 14.7 13.0 - 17.7 g/dL    Hematocrit 41.3 37.5 - 51.0 %    MCV 86.9 79.0 - 97.0 fL    MCH 30.9 26.6 - 33.0 pg    MCHC 35.6 31.5 - 35.7 g/dL    RDW 13.2 12.3 - 15.4 %    RDW-SD 41.9 37.0 - 54.0 fl    MPV 10.2 6.0 - 12.0 fL    Platelets 277 140 - 450 10*3/mm3    Neutrophil % 52.9 42.7 - 76.0 %    Lymphocyte % 37.0 19.6 - 45.3 %    Monocyte % 5.8 5.0 - 12.0 %    Eosinophil % 3.4 0.3 - 6.2 %    Basophil % 0.6 0.0 - 1.5 %    Immature Grans % 0.3 0.0 - 0.5 %    Neutrophils, Absolute 3.84 1.70 - 7.00 10*3/mm3    Lymphocytes, Absolute 2.68 0.70 - 3.10 10*3/mm3    Monocytes, Absolute 0.42 0.10 - 0.90 10*3/mm3    Eosinophils, Absolute 0.25 0.00 - 0.40 10*3/mm3    Basophils, Absolute 0.04 0.00 - 0.20 10*3/mm3    Immature Grans, Absolute 0.02 0.00 - 0.05 10*3/mm3    nRBC 0.0 0.0 - 0.2 /100 WBC   D-dimer, Quantitative   Result Value Ref Range    D-Dimer, Quantitative 469 0 - 470 ng/mL (FEU)   Lipase   Result Value Ref Range    Lipase 14 13 - 60 U/L   Comprehensive Metabolic Panel   Result Value Ref Range    Glucose 91  65 - 99 mg/dL    BUN 12 6 - 20 mg/dL    Creatinine 0.97 0.76 - 1.27 mg/dL    Sodium 140 136 - 145 mmol/L    Potassium 3.0 (L) 3.5 - 5.2 mmol/L    Chloride 98 98 - 107 mmol/L    CO2 29.0 22.0 - 29.0 mmol/L    Calcium 9.3 8.6 - 10.5 mg/dL    Total Protein 7.4 6.0 - 8.5 g/dL    Albumin 4.20 3.50 - 5.20 g/dL    ALT (SGPT) 25 1 - 41 U/L    AST (SGOT) 20 1 - 40 U/L    Alkaline Phosphatase 63 39 - 117 U/L    Total Bilirubin 0.6 0.2 - 1.2 mg/dL    eGFR Non African Amer 84 >60 mL/min/1.73    Globulin 3.2 gm/dL    A/G Ratio 1.3 g/dL    BUN/Creatinine Ratio 12.4 7.0 - 25.0    Anion Gap 13.0 5.0 - 15.0 mmol/L   Results for orders placed or performed during the hospital encounter of 09/07/17   Gold Top - SST   Result Value Ref Range    Extra Tube Hold for add-ons.    Green Top (Gel)   Result Value Ref Range    Extra Tube Hold for add-ons.    Urinalysis With / Culture If Indicated   Result Value Ref Range    Color, UA Yellow Yellow, Straw, Dark Yellow, Suzy    Appearance, UA Clear Clear    pH, UA 5.5 5.0 - 9.0    Specific Gravity, UA 1.010 1.003 - 1.030    Glucose, UA Negative Negative    Ketones, UA Negative Negative    Bilirubin, UA Negative Negative    Blood, UA Negative Negative    Protein, UA Negative Negative    Leuk Esterase, UA Negative Negative    Nitrite, UA Negative Negative    Urobilinogen, UA 0.2 E.U./dL 0.2 - 1.0 E.U./dL   CBC Auto Differential   Result Value Ref Range    WBC 7.13 3.20 - 9.80 10*3/mm3    RBC 4.94 4.37 - 5.74 10*6/mm3    Hemoglobin 15.6 13.7 - 17.3 g/dL    Hematocrit 43.8 39.0 - 49.0 %    MCV 88.7 80.0 - 98.0 fL    MCH 31.6 26.5 - 34.0 pg    MCHC 35.6 31.5 - 36.3 g/dL    RDW 13.2 11.5 - 14.5 %    RDW-SD 42.6 35.1 - 43.9 fl    MPV 11.7 8.0 - 12.0 fL    Platelets 219 150 - 450 10*3/mm3    Neutrophil % 58.2 37.0 - 80.0 %    Lymphocyte % 29.6 10.0 - 50.0 %    Monocyte % 8.6 0.0 - 12.0 %    Eosinophil % 2.9 0.0 - 7.0 %    Basophil % 0.6 0.0 - 2.0 %    Immature Grans % 0.1 0.0 - 0.5 %    Neutrophils,  Absolute 4.15 2.00 - 8.60 10*3/mm3    Lymphocytes, Absolute 2.11 0.60 - 4.20 10*3/mm3    Monocytes, Absolute 0.61 0.00 - 0.90 10*3/mm3    Eosinophils, Absolute 0.21 0.00 - 0.70 10*3/mm3    Basophils, Absolute 0.04 0.00 - 0.20 10*3/mm3    Immature Grans, Absolute 0.01 0.00 - 0.02 10*3/mm3   Lavender Top   Result Value Ref Range    Extra Tube hold for add-on    Light Blue Top   Result Value Ref Range    Extra Tube hold for add-on    Troponin   Result Value Ref Range    Troponin I <0.012 <=0.034 ng/mL   Urine Drug Screen   Result Value Ref Range    Amphetamine Screen, Urine Negative Negative    Barbiturates Screen, Urine Negative Negative    Benzodiazepine Screen, Urine Negative Negative    Cocaine Screen, Urine Negative Negative    Methadone Screen, Urine Negative Negative    Opiate Screen Positive (A) Negative    Oxycodone Screen, Urine Negative Negative    THC, Screen, Urine Negative Negative   Lipase   Result Value Ref Range    Lipase 48 23 - 300 U/L   Amylase   Result Value Ref Range    Amylase 73 50 - 130 U/L     *Note: Due to a large number of results and/or encounters for the requested time period, some results have not been displayed. A complete set of results can be found in Results Review.         This document has been electronically signed by Karen Sheikh MD on December 13, 2019 12:59 PM

## 2019-12-13 NOTE — PATIENT INSTRUCTIONS
Abdominal Pain, Adult  Abdominal pain can be caused by many things. Often, abdominal pain is not serious and it gets better with no treatment or by being treated at home. However, sometimes abdominal pain is serious. Your health care provider will do a medical history and a physical exam to try to determine the cause of your abdominal pain.  Follow these instructions at home:  · Take over-the-counter and prescription medicines only as told by your health care provider. Do not take a laxative unless told by your health care provider.  · Drink enough fluid to keep your urine clear or pale yellow.  · Watch your condition for any changes.  · Keep all follow-up visits as told by your health care provider. This is important.  Contact a health care provider if:  · Your abdominal pain changes or gets worse.  · You are not hungry or you lose weight without trying.  · You are constipated or have diarrhea for more than 2-3 days.  · You have pain when you urinate or have a bowel movement.  · Your abdominal pain wakes you up at night.  · Your pain gets worse with meals, after eating, or with certain foods.  · You are throwing up and cannot keep anything down.  · You have a fever.  Get help right away if:  · Your pain does not go away as soon as your health care provider told you to expect.  · You cannot stop throwing up.  · Your pain is only in areas of the abdomen, such as the right side or the left lower portion of the abdomen.  · You have bloody or black stools, or stools that look like tar.  · You have severe pain, cramping, or bloating in your abdomen.  · You have signs of dehydration, such as:  ? Dark urine, very little urine, or no urine.  ? Cracked lips.  ? Dry mouth.  ? Sunken eyes.  ? Sleepiness.  ? Weakness.  This information is not intended to replace advice given to you by your health care provider. Make sure you discuss any questions you have with your health care provider.  Document Released: 09/27/2006 Document  "Revised: 07/07/2017 Document Reviewed: 05/31/2017  [employer] Patient Education © 2019 [employer]  BMI for Adults    Body mass index (BMI) is a number that is calculated from a person's weight and height. BMI may help to estimate how much of a person's weight is composed of fat. BMI can help identify those who may be at higher risk for certain medical problems.  How is BMI used with adults?  BMI is used as a screening tool to identify possible weight problems. It is used to check whether a person is obese, overweight, healthy weight, or underweight.  How is BMI calculated?  BMI measures your weight and compares it to your height. This can be done either in English (U.S[place] or metric measurements. Note that charts are available to help you find your BMI quickly and easily without having to do these calculations yourself.  To calculate your BMI in English (U.S[place] measurements, your health care provider will:  1. Measure your weight in pounds (lb).  2. Multiply the number of pounds by 703.  ? For example, for a person who weighs 180 lb, multiply that number by 703, which equals [phone],540.  3. Measure your height in inches (in). Then multiply that number by itself to get a measurement called \"inches squared.\"  ? For example, for a person who is 70 in tall, the \"inches squared\" measurement is 70 in x 70 in, which equals 4900 inches squared.  4. Divide the total from Step 2 (number of lb x 703) by the total from Step 3 (inches squared): 126,540 ÷ 4900 = 25.8. This is your BMI.  To calculate your BMI in metric measurements, your health care provider will:  1. Measure your weight in kilograms (kg).  2. Measure your height in meters (m). Then multiply that number by itself to get a measurement called \"meters squared.\"  ? For example, for a person who is 1.75 m tall, the \"meters squared\" measurement is 1.75 m x 1.75 m, which is equal to 3.1 meters squared.  3. Divide the number of kilograms (your weight) by the meters " squared number. In this example: 70 ÷ 3.1 = 22.6. This is your BMI.  How is BMI interpreted?  To interpret your results, your health care provider will use BMI charts to identify whether you are underweight, normal weight, overweight, or obese. The following guidelines will be used:  · Underweight: BMI less than 18.5.  · Normal weight: BMI between 18.5 and 24.9.  · Overweight: BMI between 25 and 29.9.  · Obese: BMI of 30 and above.  Please note:  · Weight includes both fat and muscle, so someone with a muscular build, such as an athlete, may have a BMI that is higher than 24.9. In cases like these, BMI is not an accurate measure of body fat.  · To determine if excess body fat is the cause of a BMI of 25 or higher, further assessments may need to be done by a health care provider.  · BMI is usually interpreted in the same way for men and women.  Why is BMI a useful tool?  BMI is useful in two ways:  · Identifying a weight problem that may be related to a medical condition, or that may increase the risk for medical problems.  · Promoting lifestyle and diet changes in order to reach a healthy weight.  Summary  · Body mass index (BMI) is a number that is calculated from a person's weight and height.  · BMI may help to estimate how much of a person's weight is composed of fat. BMI can help identify those who may be at higher risk for certain medical problems.  · BMI can be measured using English measurements or metric measurements.  · To interpret your results, your health care provider will use BMI charts to identify whether you are underweight, normal weight, overweight, or obese.  This information is not intended to replace advice given to you by your health care provider. Make sure you discuss any questions you have with your health care provider.  Document Released: 08/29/2005 Document Revised: 10/31/2018 Document Reviewed: 10/31/2018  [employer] Interactive Patient Education © 2019 [employer]

## 2019-12-26 ENCOUNTER — DOCUMENTATION (OUTPATIENT)
Dept: GASTROENTEROLOGY | Facility: CLINIC | Age: 44
End: 2019-12-26

## 2019-12-26 ENCOUNTER — TELEPHONE (OUTPATIENT)
Dept: GASTROENTEROLOGY | Facility: CLINIC | Age: 44
End: 2019-12-26

## 2019-12-26 NOTE — TELEPHONE ENCOUNTER
Called to inform the pt that his Prevpac has been available at [facility]. I wanted to double check to see if he was aware of that. The pharmacist said he hasn't picked it up yet.

## 2020-02-11 ENCOUNTER — OFFICE VISIT (OUTPATIENT)
Dept: GASTROENTEROLOGY | Facility: CLINIC | Age: 45
End: 2020-02-11

## 2020-02-11 VITALS
OXYGEN SATURATION: 98 % | HEIGHT: 70 IN | BODY MASS INDEX: 31.41 KG/M2 | HEART RATE: 73 BPM | SYSTOLIC BLOOD PRESSURE: 124 MMHG | WEIGHT: 219.4 LBS | DIASTOLIC BLOOD PRESSURE: 94 MMHG

## 2020-02-11 DIAGNOSIS — K29.00 ACUTE SUPERFICIAL GASTRITIS WITHOUT HEMORRHAGE: Primary | ICD-10-CM

## 2020-02-11 PROCEDURE — 99213 OFFICE O/P EST LOW 20 MIN: CPT | Performed by: INTERNAL MEDICINE

## 2020-02-11 RX ORDER — CLARITHROMYCIN 500 MG/1
500 TABLET, COATED ORAL 2 TIMES DAILY
Qty: 28 TABLET | Refills: 0 | Status: SHIPPED | OUTPATIENT
Start: 2020-02-11 | End: 2021-08-24

## 2020-02-11 RX ORDER — LANSOPRAZOLE 30 MG/1
CAPSULE, DELAYED RELEASE ORAL
Qty: 28 CAPSULE | Refills: 0 | Status: SHIPPED | OUTPATIENT
Start: 2020-02-11 | End: 2021-11-11

## 2020-02-11 RX ORDER — MELOXICAM 15 MG/1
TABLET ORAL DAILY
COMMUNITY
Start: 2020-02-03 | End: 2022-10-12

## 2020-02-11 RX ORDER — AMOXICILLIN 500 MG/1
CAPSULE ORAL
Qty: 56 CAPSULE | Refills: 0 | Status: SHIPPED | OUTPATIENT
Start: 2020-02-11 | End: 2020-03-11

## 2020-02-11 RX ORDER — DESOXIMETASONE 0.5 MG/G
OINTMENT TOPICAL
COMMUNITY
Start: 2020-02-10 | End: 2021-08-24

## 2020-02-11 RX ORDER — BROMPHENIRAMINE MALEATE, PSEUDOEPHEDRINE HYDROCHLORIDE, AND DEXTROMETHORPHAN HYDROBROMIDE 2; 30; 10 MG/5ML; MG/5ML; MG/5ML
SYRUP ORAL
COMMUNITY
Start: 2020-01-26 | End: 2021-08-24

## 2020-02-11 NOTE — PROGRESS NOTES
Chief Complaint   Patient presents with   • Gastroesophageal Reflux Disease       Subjective    Marko Izaguirre is a 44 y.o. male.    History of Present Illness    Patient presented to GI clinic for follow-up visit today.  Has continued symptoms of epigastric pain.  No further nausea, vomiting, diarrhea, constipation, rectal bleeding or weight loss.  GERD is well controlled with PPI. did not take Prevpac.  Had a bout of COPD in the recent past.  EGD was consistent with hiatal hernia, esophagitis and H. pylori gastritis.     The following portions of the patient's history were reviewed and updated as appropriate:   Past Medical History:   Diagnosis Date   • Asthma    • Back pain    • Backache    • Benign hypertension    • GERD (gastroesophageal reflux disease)    • Headache    • Hypertension    • Migraine    • Osteoarthritis of multiple joints    • Paresthesia of both hands    • Seasonal allergies    • Tobacco dependence syndrome      Past Surgical History:   Procedure Laterality Date   • COLONOSCOPY  2008   • DENTAL PROCEDURE     • ENDOSCOPY N/A 12/5/2019    Procedure: ESOPHAGOGASTRODUODENOSCOPY;  Surgeon: Karen Sheikh MD;  Location: [facility] ENDOSCOPY;  Service: Gastroenterology   • UPPER GASTROINTESTINAL ENDOSCOPY  12/05/2019     Family History   Problem Relation Age of Onset   • Hypertension Mother    • Brain cancer Father    • Skin cancer Paternal Grandfather    • Cancer Other    • Heart disease Other    • Osteoarthritis Other        Prior to Admission medications    Medication Sig Start Date End Date Taking? Authorizing Provider   fluticasone (FLONASE) 50 MCG/ACT nasal spray 1 spray into each nostril Daily. 4/10/17  Yes Diana Andino MD   hydrochlorothiazide (HYDRODIURIL) 25 MG tablet Take 25 mg by mouth. 8/7/17  Yes ProviderSupriya MD   ibuprofen (ADVIL,MOTRIN) 800 MG tablet As Needed. 10/23/19  Yes ProviderSupriya MD   ondansetron ODT (ZOFRAN-ODT) 4 MG disintegrating tablet Take 1 tablet  "by mouth Every 6 (Six) Hours As Needed for Nausea or Vomiting. 9/7/17  Yes Cristina Dawson PA   SUMAtriptan (IMITREX) 50 MG tablet Take 50 mg by mouth As Needed for Migraine. 6/8/17  Yes Supriya Pineda MD   triamcinolone (KENALOG) 0.025 % ointment Apply to affected area prn 8/7/17  Yes Supriya Pineda MD   VENTOLIN  (90 BASE) MCG/ACT inhaler Inhale 2 puffs Every 4 (Four) Hours As Needed for Wheezing. 4/10/17  Yes Diana Andino MD   amoxicillin (AMOXIL) 500 MG capsule 2 capsules by mouth 2 times per day X 14 days 2/11/20   Karen Sheikh MD   brompheniramine-pseudoephedrine-DM 30-2-10 MG/5ML syrup TK 5 ML PO QID FOR UP TO 10 DAYS PRN 1/26/20   Supriya Pindea MD   clarithromycin (BIAXIN) 500 MG tablet Take 1 tablet by mouth 2 (Two) Times a Day. 2/11/20   Karen Sheikh MD   Desoximetasone 0.05 % ointment  2/10/20   Supriya Pineda MD   diclofenac (VOLTAREN) 1 % gel gel APPLY TO PAINFUL AREAS TWICE DAILY FOR 2 WEEKS AND THEN AS NEEDED 1/4/20   Supriya Pineda MD   lansoprazole (PREVACID) 30 MG capsule 1 capsule by mouth 2 times per day 2/11/20   Karen Sheikh MD   meloxicam (MOBIC) 15 MG tablet Take  by mouth Daily. 2/3/20   Supriya Pineda MD   Unable to find [facility] Callus Cream 11/18/19   Supriya Pineda MD   amoxicillin-clarithromycin-lansoprazole (PREVPAC) combo pack Take  by mouth 2 (Two) Times a Day. Follow package directions. 12/13/19 2/11/20  Karen Sheikh MD     Allergies   Allergen Reactions   • Codeine    • Diphenhydramine Other (See Comments)     \"gets cranky\"   • Phenergan [Promethazine Hcl] Nausea And Vomiting   • Promethazine Nausea And Vomiting     Social History     Socioeconomic History   • Marital status:      Spouse name: Not on file   • Number of children: Not on file   • Years of education: Not on file   • Highest education level: Not on file   Tobacco Use   • Smoking status: Current Every Day Smoker     Packs/day: " "1.00     Years: 31.00     Pack years: 31.00     Types: Cigarettes   • Smokeless tobacco: Current User     Types: Chew   • Tobacco comment: 1*800*quit*now   Substance and Sexual Activity   • Alcohol use: Yes     Comment: occassionally   • Drug use: No   • Sexual activity: Defer       Review of Systems  Review of Systems   Constitutional: Negative for chills, fatigue, fever and unexpected weight change.   HENT: Negative for congestion, ear discharge, hearing loss, nosebleeds and sore throat.    Eyes: Negative for pain, discharge and redness.   Respiratory: Negative for cough, chest tightness, shortness of breath and wheezing.    Cardiovascular: Negative for chest pain and palpitations.   Gastrointestinal: Positive for abdominal pain. Negative for abdominal distention, blood in stool, constipation, diarrhea, nausea and vomiting.   Endocrine: Negative for cold intolerance, polydipsia, polyphagia and polyuria.   Genitourinary: Negative for dysuria, flank pain, frequency, hematuria and urgency.   Musculoskeletal: Negative for arthralgias, back pain, joint swelling and myalgias.   Skin: Negative for color change, pallor and rash.   Neurological: Negative for tremors, seizures, syncope, weakness and headaches.   Hematological: Negative for adenopathy. Does not bruise/bleed easily.   Psychiatric/Behavioral: Negative for behavioral problems, confusion, dysphoric mood, hallucinations and suicidal ideas. The patient is not nervous/anxious.         /94 (BP Location: Left arm, Patient Position: Sitting, Cuff Size: Adult)   Pulse 73   Ht 177.8 cm (70\")   Wt 99.5 kg (219 lb 6.4 oz)   SpO2 98%   BMI 31.48 kg/m²     Objective    Physical Exam   Constitutional: He is oriented to person, place, and time. He appears well-developed and well-nourished.   HENT:   Head: Normocephalic and atraumatic.   Mouth/Throat: Oropharynx is clear and moist.   Eyes: Pupils are equal, round, and reactive to light. Conjunctivae and EOM are " normal.   Neck: Normal range of motion. Neck supple. No thyromegaly present.   Cardiovascular: Normal rate, regular rhythm and normal heart sounds.   No murmur heard.  Pulmonary/Chest: Effort normal and breath sounds normal. He has no wheezes.   Abdominal: Soft. Bowel sounds are normal. He exhibits no distension and no mass. There is no tenderness. No hernia.   Genitourinary:   Genitourinary Comments: No lesions noted   Musculoskeletal: Normal range of motion. He exhibits no edema or tenderness.   Lymphadenopathy:     He has no cervical adenopathy.   Neurological: He is alert and oriented to person, place, and time. No cranial nerve deficit.   Skin: Skin is warm and dry. No rash noted.   Psychiatric: He has a normal mood and affect. Thought content normal.     Admission on 12/05/2019, Discharged on 12/05/2019   Component Date Value Ref Range Status   • Case Report 12/05/2019    Final                    Value:Surgical Pathology Report                         Case: [id number]                                  Authorizing Provider:  Karen Sheikh MD      Collected:           12/05/2019 04:48 PM          Ordering Location:     [facility]             Received:            12/06/2019 09:20 AM                                 [place] [facility]                                                     Pathologist:           Olayinka Sullivan MD                                                         Specimens:   1) - Gastric, Antrum                                                                                2) - Esophagus, eg junction                                                               • Final Diagnosis 12/05/2019    Final                    Value:This result contains rich text formatting which cannot be displayed here.   • Gross Description 12/05/2019    Final                    Value:This result contains rich text formatting which cannot be displayed here.   • Special Stains 12/05/2019    Final                     Value:This result contains rich text formatting which cannot be displayed here.     Assessment/Plan    No diagnosis found..   1.  Upper abdominal pain, likely due to H. pylori gastritis.  Add Prevpac for 2 weeks and continue PPI.  2.  GERD, symptoms are well controlled with Prilosec.  Continue PPI and antireflux lifestyle.  3.  Obesity, recommend exercise and diet control.    Orders placed during this encounter include:  No orders of the defined types were placed in this encounter.      * Surgery not found *    Review and/or summary of lab tests, radiology, procedures, medications. Review and summary of old records and obtaining of history. The risks and benefits of my recommendations, as well as other treatment options were discussed with the patient today. Questions were answered.    No orders of the defined types were placed in this encounter.      Follow-up: No follow-ups on file.               Results for orders placed or performed during the hospital encounter of 12/05/19   Tissue Pathology Exam   Result Value Ref Range    Case Report       Surgical Pathology Report                         Case: [id number]                                  Authorizing Provider:  Karen Sheikh MD      Collected:           12/05/2019 04:48 PM          Ordering Location:     [facility]             Received:            12/06/2019 09:20 AM                                 [place] [facility]                                                     Pathologist:           Olayinka Sullivan MD                                                         Specimens:   1) - Gastric, Antrum                                                                                2) - Esophagus, eg junction                                                                Final Diagnosis       1.  MUCOSA, ANTRUM OF STOMACH:  CHRONIC GASTRITIS.  POSITIVE FOR HELICOBACTER PYLORI (HP IMMUNOSTAIN).    2.  MUCOSA, ESOPHAGOGASTRIC  "JUNCTION:  REACTIVE CHANGE OF SQUAMOUS MUCOSA AND CARDIAC GASTRIC MUCOSA.      Gross Description       1.  The first container is labeled \"antrum of stomach\" and has nodular bits of white soft tissue measuring 0.4 cc in aggregate.  The entire specimen is embedded as 1A.    2.  The second container is labeled \"esophagogastric junction\" and has nodular bits of white soft tissue measuring 0.4 cc in aggregate.  The entire specimen is embedded as 2A.      Special Stains       Helicobacter pylori (HP) immunostain is performed (Block 1) because an appropriate inflammatory milieu is present and organisms are not seen on H & E stained slides.    HP immunostain was developed and its performance characteristics determined by [facility].  It has not been cleared or approved by the U.S. Food and Drug Administration.  The FDA has determined that such clearance or approval is not necessary.  This test is used for clinical purposes.  It should not be regarded as investigational or for research.  This laboratory is certified under the Clinical Laboratory Improvement Amendments of 1988 (CLIA-88) as qualified to perform high complexity clinical laboratory testing.    All controls show appropriate reactivity.         Results for orders placed or performed during the hospital encounter of 09/02/19   Gold Top - SST   Result Value Ref Range    Extra Tube Hold for add-ons.    CBC Auto Differential   Result Value Ref Range    WBC 7.25 3.40 - 10.80 10*3/mm3    RBC 4.75 4.14 - 5.80 10*6/mm3    Hemoglobin 14.7 13.0 - 17.7 g/dL    Hematocrit 41.3 37.5 - 51.0 %    MCV 86.9 79.0 - 97.0 fL    MCH 30.9 26.6 - 33.0 pg    MCHC 35.6 31.5 - 35.7 g/dL    RDW 13.2 12.3 - 15.4 %    RDW-SD 41.9 37.0 - 54.0 fl    MPV 10.2 6.0 - 12.0 fL    Platelets 277 140 - 450 10*3/mm3    Neutrophil % 52.9 42.7 - 76.0 %    Lymphocyte % 37.0 19.6 - 45.3 %    Monocyte % 5.8 5.0 - 12.0 %    Eosinophil % 3.4 0.3 - 6.2 %    Basophil % 0.6 " 0.0 - 1.5 %    Immature Grans % 0.3 0.0 - 0.5 %    Neutrophils, Absolute 3.84 1.70 - 7.00 10*3/mm3    Lymphocytes, Absolute 2.68 0.70 - 3.10 10*3/mm3    Monocytes, Absolute 0.42 0.10 - 0.90 10*3/mm3    Eosinophils, Absolute 0.25 0.00 - 0.40 10*3/mm3    Basophils, Absolute 0.04 0.00 - 0.20 10*3/mm3    Immature Grans, Absolute 0.02 0.00 - 0.05 10*3/mm3    nRBC 0.0 0.0 - 0.2 /100 WBC   D-dimer, Quantitative   Result Value Ref Range    D-Dimer, Quantitative 469 0 - 470 ng/mL (FEU)   Lipase   Result Value Ref Range    Lipase 14 13 - 60 U/L   Comprehensive Metabolic Panel   Result Value Ref Range    Glucose 91 65 - 99 mg/dL    BUN 12 6 - 20 mg/dL    Creatinine 0.97 0.76 - 1.27 mg/dL    Sodium 140 136 - 145 mmol/L    Potassium 3.0 (L) 3.5 - 5.2 mmol/L    Chloride 98 98 - 107 mmol/L    CO2 29.0 22.0 - 29.0 mmol/L    Calcium 9.3 8.6 - 10.5 mg/dL    Total Protein 7.4 6.0 - 8.5 g/dL    Albumin 4.20 3.50 - 5.20 g/dL    ALT (SGPT) 25 1 - 41 U/L    AST (SGOT) 20 1 - 40 U/L    Alkaline Phosphatase 63 39 - 117 U/L    Total Bilirubin 0.6 0.2 - 1.2 mg/dL    eGFR Non African Amer 84 >60 mL/min/1.73    Globulin 3.2 gm/dL    A/G Ratio 1.3 g/dL    BUN/Creatinine Ratio 12.4 7.0 - 25.0    Anion Gap 13.0 5.0 - 15.0 mmol/L   Results for orders placed or performed during the hospital encounter of 09/07/17   Gold Top - SST   Result Value Ref Range    Extra Tube Hold for add-ons.    Green Top (Gel)   Result Value Ref Range    Extra Tube Hold for add-ons.    Urinalysis With / Culture If Indicated   Result Value Ref Range    Color, UA Yellow Yellow, Straw, Dark Yellow, Suzy    Appearance, UA Clear Clear    pH, UA 5.5 5.0 - 9.0    Specific Gravity, UA 1.010 1.003 - 1.030    Glucose, UA Negative Negative    Ketones, UA Negative Negative    Bilirubin, UA Negative Negative    Blood, UA Negative Negative    Protein, UA Negative Negative    Leuk Esterase, UA Negative Negative    Nitrite, UA Negative Negative    Urobilinogen, UA 0.2 E.U./dL 0.2 - 1.0  E.U./dL   CBC Auto Differential   Result Value Ref Range    WBC 7.13 3.20 - 9.80 10*3/mm3    RBC 4.94 4.37 - 5.74 10*6/mm3    Hemoglobin 15.6 13.7 - 17.3 g/dL    Hematocrit 43.8 39.0 - 49.0 %    MCV 88.7 80.0 - 98.0 fL    MCH 31.6 26.5 - 34.0 pg    MCHC 35.6 31.5 - 36.3 g/dL    RDW 13.2 11.5 - 14.5 %    RDW-SD 42.6 35.1 - 43.9 fl    MPV 11.7 8.0 - 12.0 fL    Platelets 219 150 - 450 10*3/mm3    Neutrophil % 58.2 37.0 - 80.0 %    Lymphocyte % 29.6 10.0 - 50.0 %    Monocyte % 8.6 0.0 - 12.0 %    Eosinophil % 2.9 0.0 - 7.0 %    Basophil % 0.6 0.0 - 2.0 %    Immature Grans % 0.1 0.0 - 0.5 %    Neutrophils, Absolute 4.15 2.00 - 8.60 10*3/mm3    Lymphocytes, Absolute 2.11 0.60 - 4.20 10*3/mm3    Monocytes, Absolute 0.61 0.00 - 0.90 10*3/mm3    Eosinophils, Absolute 0.21 0.00 - 0.70 10*3/mm3    Basophils, Absolute 0.04 0.00 - 0.20 10*3/mm3    Immature Grans, Absolute 0.01 0.00 - 0.02 10*3/mm3   Lavender Top   Result Value Ref Range    Extra Tube hold for add-on    Light Blue Top   Result Value Ref Range    Extra Tube hold for add-on    Troponin   Result Value Ref Range    Troponin I <0.012 <=0.034 ng/mL   Urine Drug Screen   Result Value Ref Range    Amphetamine Screen, Urine Negative Negative    Barbiturates Screen, Urine Negative Negative    Benzodiazepine Screen, Urine Negative Negative    Cocaine Screen, Urine Negative Negative    Methadone Screen, Urine Negative Negative    Opiate Screen Positive (A) Negative    Oxycodone Screen, Urine Negative Negative    THC, Screen, Urine Negative Negative   Lipase   Result Value Ref Range    Lipase 48 23 - 300 U/L   Amylase   Result Value Ref Range    Amylase 73 50 - 130 U/L     *Note: Due to a large number of results and/or encounters for the requested time period, some results have not been displayed. A complete set of results can be found in Results Review.         This document has been electronically signed by Karen Sheikh MD on February 11, 2020 5:41 PM

## 2020-02-11 NOTE — PATIENT INSTRUCTIONS

## 2020-03-11 ENCOUNTER — OFFICE VISIT (OUTPATIENT)
Dept: GASTROENTEROLOGY | Facility: CLINIC | Age: 45
End: 2020-03-11

## 2020-03-11 VITALS
HEART RATE: 66 BPM | SYSTOLIC BLOOD PRESSURE: 134 MMHG | BODY MASS INDEX: 31.87 KG/M2 | DIASTOLIC BLOOD PRESSURE: 78 MMHG | HEIGHT: 70 IN | WEIGHT: 222.6 LBS

## 2020-03-11 DIAGNOSIS — K63.5 POLYP OF COLON, UNSPECIFIED PART OF COLON, UNSPECIFIED TYPE: Primary | ICD-10-CM

## 2020-03-11 PROCEDURE — 99213 OFFICE O/P EST LOW 20 MIN: CPT | Performed by: INTERNAL MEDICINE

## 2020-03-11 RX ORDER — DEXTROSE AND SODIUM CHLORIDE 5; .45 G/100ML; G/100ML
30 INJECTION, SOLUTION INTRAVENOUS CONTINUOUS PRN
Status: CANCELLED | OUTPATIENT
Start: 2020-03-11

## 2020-03-11 NOTE — PROGRESS NOTES
Chief Complaint   Patient presents with   • Heartburn       Subjective    Marko Izaguirre is a 44 y.o. male.    History of Present Illness    Patient presented to GI clinic for follow-up visit today.  Feels some better.  Abdominal pain is improving.  Has intermittent bouts of nausea and vomiting.  Denied diarrhea, constipation, rectal bleeding or weight loss.  Completing Prevpac.  Reports history of colon polyps and his last colonoscopy apparently was in 2008.     The following portions of the patient's history were reviewed and updated as appropriate:   Past Medical History:   Diagnosis Date   • Asthma    • Back pain    • Backache    • Benign hypertension    • GERD (gastroesophageal reflux disease)    • Headache    • Hypertension    • Migraine    • Osteoarthritis of multiple joints    • Paresthesia of both hands    • Seasonal allergies    • Tobacco dependence syndrome      Past Surgical History:   Procedure Laterality Date   • COLONOSCOPY  2008   • DENTAL PROCEDURE     • ENDOSCOPY N/A 12/5/2019    Procedure: ESOPHAGOGASTRODUODENOSCOPY;  Surgeon: Karen Sheikh MD;  Location[facility] ENDOSCOPY;  Service: Gastroenterology   • UPPER GASTROINTESTINAL ENDOSCOPY  12/05/2019     Family History   Problem Relation Age of Onset   • Hypertension Mother    • Brain cancer Father    • Skin cancer Paternal Grandfather    • Cancer Other    • Heart disease Other    • Osteoarthritis Other        Prior to Admission medications    Medication Sig Start Date End Date Taking? Authorizing Provider   lansoprazole (PREVACID) 30 MG capsule 1 capsule by mouth 2 times per day 2/11/20  Yes Karen Sheikh MD   brompheniramine-pseudoephedrine-DM 30-2-10 MG/5ML syrup TK 5 ML PO QID FOR UP TO 10 DAYS PRN 1/26/20   Supriya Pineda MD   clarithromycin (BIAXIN) 500 MG tablet Take 1 tablet by mouth 2 (Two) Times a Day. 2/11/20   Karen Sheikh MD   Desoximetasone 0.05 % ointment  2/10/20   Supriya Pineda MD   diclofenac  "(VOLTAREN) 1 % gel gel APPLY TO PAINFUL AREAS TWICE DAILY FOR 2 WEEKS AND THEN AS NEEDED 1/4/20   Supriya Pineda MD   fluticasone (FLONASE) 50 MCG/ACT nasal spray 1 spray into each nostril Daily. 4/10/17   Diana Andino MD   hydrochlorothiazide (HYDRODIURIL) 25 MG tablet Take 25 mg by mouth. 8/7/17   Supriya Pineda MD   ibuprofen (ADVIL,MOTRIN) 800 MG tablet As Needed. 10/23/19   Supriya Pineda MD   meloxicam (MOBIC) 15 MG tablet Take  by mouth Daily. 2/3/20   Supriya Pineda MD   ondansetron ODT (ZOFRAN-ODT) 4 MG disintegrating tablet Take 1 tablet by mouth Every 6 (Six) Hours As Needed for Nausea or Vomiting. 9/7/17   Cristina Dawson PA   SUMAtriptan (IMITREX) 50 MG tablet Take 50 mg by mouth As Needed for Migraine. 6/8/17   Supriya Pineda MD   triamcinolone (KENALOG) 0.025 % ointment Apply to affected area prn 8/7/17   Supriya Pineda MD   Unable to find [facility] Callus Cream 11/18/19   Supriya Pineda MD   VENTOLIN  (90 BASE) MCG/ACT inhaler Inhale 2 puffs Every 4 (Four) Hours As Needed for Wheezing. 4/10/17   Diana Andino MD   amoxicillin (AMOXIL) 500 MG capsule 2 capsules by mouth 2 times per day X 14 days 2/11/20 3/11/20  Karen Sheikh MD     Allergies   Allergen Reactions   • Codeine    • Diphenhydramine Other (See Comments)     \"gets cranky\"   • Phenergan [Promethazine Hcl] Nausea And Vomiting   • Promethazine Nausea And Vomiting     Social History     Socioeconomic History   • Marital status:      Spouse name: Not on file   • Number of children: Not on file   • Years of education: Not on file   • Highest education level: Not on file   Tobacco Use   • Smoking status: Current Every Day Smoker     Packs/day: 0.50     Years: 31.00     Pack years: 15.50     Types: Cigarettes   • Smokeless tobacco: Current User     Types: Chew   • Tobacco comment: 1*800*quit*now   Substance and Sexual Activity   • Alcohol use: Yes     Comment: occassionally  " "  • Drug use: No   • Sexual activity: Defer       Review of Systems  Review of Systems   Constitutional: Negative for chills, fatigue, fever and unexpected weight change.   HENT: Negative for congestion, ear discharge, hearing loss, nosebleeds and sore throat.    Eyes: Negative for pain, discharge and redness.   Respiratory: Negative for cough, chest tightness, shortness of breath and wheezing.    Cardiovascular: Negative for chest pain and palpitations.   Gastrointestinal: Positive for abdominal pain, nausea and vomiting. Negative for abdominal distention, blood in stool, constipation and diarrhea.   Endocrine: Negative for cold intolerance, polydipsia, polyphagia and polyuria.   Genitourinary: Negative for dysuria, flank pain, frequency, hematuria and urgency.   Musculoskeletal: Negative for arthralgias, back pain, joint swelling and myalgias.   Skin: Negative for color change, pallor and rash.   Neurological: Negative for tremors, seizures, syncope, weakness and headaches.   Hematological: Negative for adenopathy. Does not bruise/bleed easily.   Psychiatric/Behavioral: Negative for behavioral problems, confusion, dysphoric mood, hallucinations and suicidal ideas. The patient is not nervous/anxious.         /78 (BP Location: Right arm, Patient Position: Sitting)   Pulse 66   Ht 177.8 cm (70\")   Wt 101 kg (222 lb 9.6 oz)   BMI 31.94 kg/m²     Objective    Physical Exam   Constitutional: He is oriented to person, place, and time. He appears well-developed and well-nourished.   HENT:   Head: Normocephalic and atraumatic.   Mouth/Throat: Oropharynx is clear and moist.   Eyes: Pupils are equal, round, and reactive to light. Conjunctivae and EOM are normal.   Neck: Normal range of motion. Neck supple. No thyromegaly present.   Cardiovascular: Normal rate, regular rhythm and normal heart sounds.   No murmur heard.  Pulmonary/Chest: Effort normal and breath sounds normal. He has no wheezes.   Abdominal: Soft. " Bowel sounds are normal. He exhibits no distension and no mass. There is no tenderness. No hernia.   Genitourinary:   Genitourinary Comments: No lesions noted   Musculoskeletal: Normal range of motion. He exhibits no edema or tenderness.   Lymphadenopathy:     He has no cervical adenopathy.   Neurological: He is alert and oriented to person, place, and time. No cranial nerve deficit.   Skin: Skin is warm and dry. No rash noted.   Psychiatric: He has a normal mood and affect. Thought content normal.     Admission on 12/05/2019, Discharged on 12/05/2019   Component Date Value Ref Range Status   • Case Report 12/05/2019    Final                    Value:Surgical Pathology Report                         Case: [id number]                                  Authorizing Provider:  Karen Sheikh MD      Collected:           12/05/2019 04:48 PM          Ordering Location:     [facility]             Received:            12/06/2019 09:20 AM                                 [place] [facility]                                                     Pathologist:           Olayinka Sullivan MD                                                         Specimens:   1) - Gastric, Antrum                                                                                2) - Esophagus, eg junction                                                               • Final Diagnosis 12/05/2019    Final                    Value:This result contains rich text formatting which cannot be displayed here.   • Gross Description 12/05/2019    Final                    Value:This result contains rich text formatting which cannot be displayed here.   • Special Stains 12/05/2019    Final                    Value:This result contains rich text formatting which cannot be displayed here.     Assessment/Plan      1. Polyp of colon, unspecified part of colon, unspecified type    1.  Epigastric pain with nausea and vomiting, improving.  Likely due to H.  pylori gastritis.  Continue Prevpac.  Gastric emptying scan if symptoms persist.  2.  History of colon polyps, schedule colonoscopy for reevaluation.  3.  GERD, symptoms well controlled with Prilosec.  Continue PPI and antireflux lifestyle.  4.  Obesity, recommend exercise and diet control.      Orders placed during this encounter include:  Orders Placed This Encounter   Procedures   • Follow Anesthesia Guidelines / Standing Orders     Standing Status:   Future   • Obtain Informed Consent     Standing Status:   Future     Order Specific Question:   Informed Consent Given For     Answer:   colonoscopy       COLONOSCOPY (N/A)    Review and/or summary of lab tests, radiology, procedures, medications. Review and summary of old records and obtaining of history. The risks and benefits of my recommendations, as well as other treatment options were discussed with the patient today. Questions were answered.    No orders of the defined types were placed in this encounter.      Follow-up: No follow-ups on file.               Results for orders placed or performed during the hospital encounter of 12/05/19   Tissue Pathology Exam   Result Value Ref Range    Case Report       Surgical Pathology Report                         Case: [id number]                                  Authorizing Provider:  Karen Sheikh MD      Collected:           12/05/2019 04:48 PM          Ordering Location:     [facility]             Received:            12/06/2019 09:20 AM                                 [place] [facility]                                                     Pathologist:           Olayinka Sullivan MD                                                         Specimens:   1) - Gastric, Antrum                                                                                2) - Esophagus, eg junction                                                                Final Diagnosis       1.  MUCOSA, ANTRUM OF STOMACH:  CHRONIC  "GASTRITIS.  POSITIVE FOR HELICOBACTER PYLORI (HP IMMUNOSTAIN).    2.  MUCOSA, ESOPHAGOGASTRIC JUNCTION:  REACTIVE CHANGE OF SQUAMOUS MUCOSA AND CARDIAC GASTRIC MUCOSA.      Gross Description       1.  The first container is labeled \"antrum of stomach\" and has nodular bits of white soft tissue measuring 0.4 cc in aggregate.  The entire specimen is embedded as 1A.    2.  The second container is labeled \"esophagogastric junction\" and has nodular bits of white soft tissue measuring 0.4 cc in aggregate.  The entire specimen is embedded as 2A.      Special Stains       Helicobacter pylori (HP) immunostain is performed (Block 1) because an appropriate inflammatory milieu is present and organisms are not seen on H & E stained slides.    HP immunostain was developed and its performance characteristics determined by [facility].  It has not been cleared or approved by the U.S. Food and Drug Administration.  The FDA has determined that such clearance or approval is not necessary.  This test is used for clinical purposes.  It should not be regarded as investigational or for research.  This laboratory is certified under the Clinical Laboratory Improvement Amendments of 1988 (CLIA-88) as qualified to perform high complexity clinical laboratory testing.    All controls show appropriate reactivity.         Results for orders placed or performed during the hospital encounter of 09/02/19   Gold Top - [facility]   Result Value Ref Range    Extra Tube Hold for add-ons.    CBC Auto Differential   Result Value Ref Range    WBC 7.25 3.40 - 10.80 10*3/mm3    RBC 4.75 4.14 - 5.80 10*6/mm3    Hemoglobin 14.7 13.0 - 17.7 g/dL    Hematocrit 41.3 37.5 - 51.0 %    MCV 86.9 79.0 - 97.0 fL    MCH 30.9 26.6 - 33.0 pg    MCHC 35.6 31.5 - 35.7 g/dL    RDW 13.2 12.3 - 15.4 %    RDW-SD 41.9 37.0 - 54.0 fl    MPV 10.2 6.0 - 12.0 fL    Platelets 277 140 - 450 10*3/mm3    Neutrophil % 52.9 42.7 - 76.0 %    Lymphocyte % 37.0 " 19.6 - 45.3 %    Monocyte % 5.8 5.0 - 12.0 %    Eosinophil % 3.4 0.3 - 6.2 %    Basophil % 0.6 0.0 - 1.5 %    Immature Grans % 0.3 0.0 - 0.5 %    Neutrophils, Absolute 3.84 1.70 - 7.00 10*3/mm3    Lymphocytes, Absolute 2.68 0.70 - 3.10 10*3/mm3    Monocytes, Absolute 0.42 0.10 - 0.90 10*3/mm3    Eosinophils, Absolute 0.25 0.00 - 0.40 10*3/mm3    Basophils, Absolute 0.04 0.00 - 0.20 10*3/mm3    Immature Grans, Absolute 0.02 0.00 - 0.05 10*3/mm3    nRBC 0.0 0.0 - 0.2 /100 WBC   D-dimer, Quantitative   Result Value Ref Range    D-Dimer, Quantitative 469 0 - 470 ng/mL (FEU)   Lipase   Result Value Ref Range    Lipase 14 13 - 60 U/L   Comprehensive Metabolic Panel   Result Value Ref Range    Glucose 91 65 - 99 mg/dL    BUN 12 6 - 20 mg/dL    Creatinine 0.97 0.76 - 1.27 mg/dL    Sodium 140 136 - 145 mmol/L    Potassium 3.0 (L) 3.5 - 5.2 mmol/L    Chloride 98 98 - 107 mmol/L    CO2 29.0 22.0 - 29.0 mmol/L    Calcium 9.3 8.6 - 10.5 mg/dL    Total Protein 7.4 6.0 - 8.5 g/dL    Albumin 4.20 3.50 - 5.20 g/dL    ALT (SGPT) 25 1 - 41 U/L    AST (SGOT) 20 1 - 40 U/L    Alkaline Phosphatase 63 39 - 117 U/L    Total Bilirubin 0.6 0.2 - 1.2 mg/dL    eGFR Non African Amer 84 >60 mL/min/1.73    Globulin 3.2 gm/dL    A/G Ratio 1.3 g/dL    BUN/Creatinine Ratio 12.4 7.0 - 25.0    Anion Gap 13.0 5.0 - 15.0 mmol/L   Results for orders placed or performed during the hospital encounter of 09/07/17   Gold Top - [facility]   Result Value Ref Range    Extra Tube Hold for add-ons.    Green Top (G[facility])   Result Value Ref Range    Extra Tube Hold for add-ons.    Urinalysis With / Culture If Indicated   Result Value Ref Range    Color, UA Yellow Yellow, Straw, Dark Yellow, Suzy    Appearance, UA Clear Clear    pH, UA 5.5 5.0 - 9.0    Specific Gravity, UA 1.010 1.003 - 1.030    Glucose, UA Negative Negative    Ketones, UA Negative Negative    Bilirubin, UA Negative Negative    Blood, UA Negative Negative    Protein, UA Negative Negative    Leuk Esterase, UA  Negative Negative    Nitrite, UA Negative Negative    Urobilinogen, UA 0.2 E.U./dL 0.2 - 1.0 E.U./dL   CBC Auto Differential   Result Value Ref Range    WBC 7.13 3.20 - 9.80 10*3/mm3    RBC 4.94 4.37 - 5.74 10*6/mm3    Hemoglobin 15.6 13.7 - 17.3 g/dL    Hematocrit 43.8 39.0 - 49.0 %    MCV 88.7 80.0 - 98.0 fL    MCH 31.6 26.5 - 34.0 pg    MCHC 35.6 31.5 - 36.3 g/dL    RDW 13.2 11.5 - 14.5 %    RDW-SD 42.6 35.1 - 43.9 fl    MPV 11.7 8.0 - 12.0 fL    Platelets 219 150 - 450 10*3/mm3    Neutrophil % 58.2 37.0 - 80.0 %    Lymphocyte % 29.6 10.0 - 50.0 %    Monocyte % 8.6 0.0 - 12.0 %    Eosinophil % 2.9 0.0 - 7.0 %    Basophil % 0.6 0.0 - 2.0 %    Immature Grans % 0.1 0.0 - 0.5 %    Neutrophils, Absolute 4.15 2.00 - 8.60 10*3/mm3    Lymphocytes, Absolute 2.11 0.60 - 4.20 10*3/mm3    Monocytes, Absolute 0.61 0.00 - 0.90 10*3/mm3    Eosinophils, Absolute 0.21 0.00 - 0.70 10*3/mm3    Basophils, Absolute 0.04 0.00 - 0.20 10*3/mm3    Immature Grans, Absolute 0.01 0.00 - 0.02 10*3/mm3   Lavender Top   Result Value Ref Range    Extra Tube hold for add-on    Light Blue Top   Result Value Ref Range    Extra Tube hold for add-on    Troponin   Result Value Ref Range    Troponin I <0.012 <=0.034 ng/mL   Urine Drug Screen   Result Value Ref Range    Amphetamine Screen, Urine Negative Negative    Barbiturates Screen, Urine Negative Negative    Benzodiazepine Screen, Urine Negative Negative    Cocaine Screen, Urine Negative Negative    Methadone Screen, Urine Negative Negative    Opiate Screen Positive (A) Negative    Oxycodone Screen, Urine Negative Negative    THC, Screen, Urine Negative Negative   Lipase   Result Value Ref Range    Lipase 48 23 - 300 U/L   Amylase   Result Value Ref Range    Amylase 73 50 - 130 U/L     *Note: Due to a large number of results and/or encounters for the requested time period, some results have not been displayed. A complete set of results can be found in Results Review.         This document has been  electronically signed by Karen Sheikh MD on March 11, 2020 16:32

## 2020-03-11 NOTE — PATIENT INSTRUCTIONS

## 2020-03-12 PROBLEM — K63.5 POLYP OF COLON: Status: ACTIVE | Noted: 2020-03-12

## 2020-05-15 ENCOUNTER — HOSPITAL ENCOUNTER (OUTPATIENT)
Dept: MRI IMAGING | Facility: HOSPITAL | Age: 45
Discharge: HOME | End: 2020-05-15
Admitting: PHYSICAL MEDICINE & REHABILITATION

## 2020-05-15 DIAGNOSIS — M54.50 LOW BACK PAIN, UNSPECIFIED BACK PAIN LATERALITY, UNSPECIFIED CHRONICITY, UNSPECIFIED WHETHER SCIATICA PRESENT: ICD-10-CM

## 2020-05-15 PROCEDURE — 72148 MRI LUMBAR SPINE W/O DYE: CPT

## 2020-07-18 ENCOUNTER — APPOINTMENT (OUTPATIENT)
Dept: LAB | Facility: HOSPITAL | Age: 45
End: 2020-07-18

## 2021-02-03 ENCOUNTER — APPOINTMENT (OUTPATIENT)
Dept: CT IMAGING | Facility: HOSPITAL | Age: 46
End: 2021-02-03

## 2021-02-03 ENCOUNTER — HOSPITAL ENCOUNTER (EMERGENCY)
Facility: HOSPITAL | Age: 46
Discharge: HOME | End: 2021-02-03
Attending: FAMILY MEDICINE | Admitting: FAMILY MEDICINE

## 2021-02-03 VITALS
BODY MASS INDEX: 32.93 KG/M2 | HEIGHT: 70 IN | WEIGHT: 230 LBS | OXYGEN SATURATION: 98 % | SYSTOLIC BLOOD PRESSURE: 153 MMHG | RESPIRATION RATE: 18 BRPM | DIASTOLIC BLOOD PRESSURE: 85 MMHG | HEART RATE: 69 BPM | TEMPERATURE: 98.2 F

## 2021-02-03 DIAGNOSIS — R51.9 NONINTRACTABLE HEADACHE, UNSPECIFIED CHRONICITY PATTERN, UNSPECIFIED HEADACHE TYPE: Primary | ICD-10-CM

## 2021-02-03 LAB
ALBUMIN SERPL-MCNC: 3.8 G/DL (ref 3.5–5.2)
ALBUMIN/GLOB SERPL: 1.5 G/DL
ALP SERPL-CCNC: 66 U/L (ref 39–117)
ALT SERPL W P-5'-P-CCNC: 23 U/L (ref 1–41)
ANION GAP SERPL CALCULATED.3IONS-SCNC: 7 MMOL/L (ref 5–15)
AST SERPL-CCNC: 20 U/L (ref 1–40)
BASOPHILS # BLD AUTO: 0.07 10*3/MM3 (ref 0–0.2)
BASOPHILS NFR BLD AUTO: 0.7 % (ref 0–1.5)
BILIRUB SERPL-MCNC: 0.5 MG/DL (ref 0–1.2)
BUN SERPL-MCNC: 17 MG/DL (ref 6–20)
BUN/CREAT SERPL: 14.4 (ref 7–25)
CALCIUM SPEC-SCNC: 9.4 MG/DL (ref 8.6–10.5)
CHLORIDE SERPL-SCNC: 104 MMOL/L (ref 98–107)
CO2 SERPL-SCNC: 26 MMOL/L (ref 22–29)
CREAT SERPL-MCNC: 1.18 MG/DL (ref 0.76–1.27)
DEPRECATED RDW RBC AUTO: 41.1 FL (ref 37–54)
EOSINOPHIL # BLD AUTO: 0.27 10*3/MM3 (ref 0–0.4)
EOSINOPHIL NFR BLD AUTO: 2.8 % (ref 0.3–6.2)
ERYTHROCYTE [DISTWIDTH] IN BLOOD BY AUTOMATED COUNT: 13.1 % (ref 12.3–15.4)
GFR SERPL CREATININE-BSD FRML MDRD: 67 ML/MIN/1.73
GLOBULIN UR ELPH-MCNC: 2.6 GM/DL
GLUCOSE SERPL-MCNC: 93 MG/DL (ref 65–99)
HCT VFR BLD AUTO: 41.8 % (ref 37.5–51)
HGB BLD-MCNC: 15.2 G/DL (ref 13–17.7)
IMM GRANULOCYTES # BLD AUTO: 0.03 10*3/MM3 (ref 0–0.05)
IMM GRANULOCYTES NFR BLD AUTO: 0.3 % (ref 0–0.5)
LYMPHOCYTES # BLD AUTO: 2.67 10*3/MM3 (ref 0.7–3.1)
LYMPHOCYTES NFR BLD AUTO: 27.8 % (ref 19.6–45.3)
MCH RBC QN AUTO: 31.7 PG (ref 26.6–33)
MCHC RBC AUTO-ENTMCNC: 36.4 G/DL (ref 31.5–35.7)
MCV RBC AUTO: 87.1 FL (ref 79–97)
MONOCYTES # BLD AUTO: 0.64 10*3/MM3 (ref 0.1–0.9)
MONOCYTES NFR BLD AUTO: 6.7 % (ref 5–12)
NEUTROPHILS NFR BLD AUTO: 5.94 10*3/MM3 (ref 1.7–7)
NEUTROPHILS NFR BLD AUTO: 61.7 % (ref 42.7–76)
NRBC BLD AUTO-RTO: 0 /100 WBC (ref 0–0.2)
PLATELET # BLD AUTO: 288 10*3/MM3 (ref 140–450)
PMV BLD AUTO: 10.5 FL (ref 6–12)
POTASSIUM SERPL-SCNC: 3.4 MMOL/L (ref 3.5–5.2)
PROT SERPL-MCNC: 6.4 G/DL (ref 6–8.5)
RBC # BLD AUTO: 4.8 10*6/MM3 (ref 4.14–5.8)
SODIUM SERPL-SCNC: 137 MMOL/L (ref 136–145)
WBC # BLD AUTO: 9.62 10*3/MM3 (ref 3.4–10.8)

## 2021-02-03 PROCEDURE — 25010000002 KETOROLAC TROMETHAMINE PER 15 MG: Performed by: PHYSICIAN ASSISTANT

## 2021-02-03 PROCEDURE — 96375 TX/PRO/DX INJ NEW DRUG ADDON: CPT

## 2021-02-03 PROCEDURE — 70450 CT HEAD/BRAIN W/O DYE: CPT

## 2021-02-03 PROCEDURE — 80053 COMPREHEN METABOLIC PANEL: CPT | Performed by: PHYSICIAN ASSISTANT

## 2021-02-03 PROCEDURE — 25010000002 BUTORPHANOL PER 1 MG: Performed by: FAMILY MEDICINE

## 2021-02-03 PROCEDURE — 99283 EMERGENCY DEPT VISIT LOW MDM: CPT

## 2021-02-03 PROCEDURE — 96374 THER/PROPH/DIAG INJ IV PUSH: CPT

## 2021-02-03 PROCEDURE — 85025 COMPLETE CBC W/AUTO DIFF WBC: CPT | Performed by: PHYSICIAN ASSISTANT

## 2021-02-03 RX ORDER — SODIUM CHLORIDE 0.9 % (FLUSH) 0.9 %
10 SYRINGE (ML) INJECTION AS NEEDED
Status: DISCONTINUED | OUTPATIENT
Start: 2021-02-03 | End: 2021-02-03 | Stop reason: HOSPADM

## 2021-02-03 RX ORDER — KETOROLAC TROMETHAMINE 30 MG/ML
30 INJECTION, SOLUTION INTRAMUSCULAR; INTRAVENOUS ONCE
Status: COMPLETED | OUTPATIENT
Start: 2021-02-03 | End: 2021-02-03

## 2021-02-03 RX ORDER — BUTORPHANOL TARTRATE 1 MG/ML
1 INJECTION, SOLUTION INTRAMUSCULAR; INTRAVENOUS ONCE
Status: COMPLETED | OUTPATIENT
Start: 2021-02-03 | End: 2021-02-03

## 2021-02-03 RX ADMIN — BUTORPHANOL TARTRATE 1 MG: 1 INJECTION, SOLUTION INTRAMUSCULAR; INTRAVENOUS at 13:03

## 2021-02-03 RX ADMIN — KETOROLAC TROMETHAMINE 30 MG: 30 INJECTION, SOLUTION INTRAMUSCULAR; INTRAVENOUS at 14:58

## 2021-02-03 RX ADMIN — SODIUM CHLORIDE 1000 ML: 9 INJECTION, SOLUTION INTRAVENOUS at 13:02

## 2021-02-03 NOTE — ED TRIAGE NOTES
Pt comes in today c/o migraine that has  Been on and off since last week at the base of his skull. He was started on Topamax and Zanaflex last week but he can't work on the medication as it makes him tired.     He is alert and oriented x4. Pupils PERRL.

## 2021-02-03 NOTE — ED PROVIDER NOTES
Subjective   Patient presents to emergency department for headache x1 week.  Endorses associated blurred vision, nausea, photophobia.  Meds he was prescribed make him tired.  Only changes that his headache is usually in front of head and now in rear.  Endorses worse headache of life.  Denies any weakness/numbness/tingling, difficulty speaking, confusion, dizziness, syncope, recent head trauma.        History provided by:  Patient   used: No    Headache  Pain location:  Occipital  Quality:  Dull  Radiates to:  Does not radiate  Onset quality:  Sudden  Duration:  1 week  Timing:  Constant  Chronicity:  New  Similar to prior headaches: yes    Context: activity and bright light    Associated symptoms: nausea and photophobia    Associated symptoms: no back pain, no cough, no dizziness, no fever, no numbness, no seizures, no sore throat and no weakness        Review of Systems   Constitutional: Negative for chills and fever.   HENT: Negative for sore throat and trouble swallowing.    Eyes: Positive for photophobia. Negative for visual disturbance.   Respiratory: Negative for cough, shortness of breath and wheezing.    Cardiovascular: Negative for chest pain.   Gastrointestinal: Positive for nausea.   Genitourinary: Negative for dysuria and flank pain.   Musculoskeletal: Negative for back pain.   Skin: Negative for color change.   Allergic/Immunologic: Negative for immunocompromised state.   Neurological: Positive for headaches. Negative for dizziness, seizures, syncope, facial asymmetry, speech difficulty, weakness, light-headedness and numbness.   Hematological: Does not bruise/bleed easily.   Psychiatric/Behavioral: Negative for confusion.       Past Medical History:   Diagnosis Date   • Asthma    • Back pain    • Backache    • Benign hypertension    • GERD (gastroesophageal reflux disease)    • Headache    • Hypertension    • Migraine    • Osteoarthritis of multiple joints    • Paresthesia of both  "hands    • Seasonal allergies    • Tobacco dependence syndrome        Allergies   Allergen Reactions   • Codeine    • Diphenhydramine Other (See Comments)     \"gets cranky\"   • Phenergan [Promethazine Hcl] Nausea And Vomiting   • Promethazine Nausea And Vomiting       Past Surgical History:   Procedure Laterality Date   • COLONOSCOPY  2008   • DENTAL PROCEDURE     • ENDOSCOPY N/A 12/5/2019    Procedure: ESOPHAGOGASTRODUODENOSCOPY;  Surgeon: Karen Sheikh MD;  Location: [facility] ENDOSCOPY;  Service: Gastroenterology   • UPPER GASTROINTESTINAL ENDOSCOPY  12/05/2019       Family History   Problem Relation Age of Onset   • Hypertension Mother    • Brain cancer Father    • Skin cancer Paternal Grandfather    • Cancer Other    • Heart disease Other    • Osteoarthritis Other        Social History     Socioeconomic History   • Marital status:      Spouse name: Not on file   • Number of children: Not on file   • Years of education: Not on file   • Highest education level: Not on file   Tobacco Use   • Smoking status: Current Every Day Smoker     Packs/day: 0.50     Years: 31.00     Pack years: 15.50     Types: Cigarettes   • Smokeless tobacco: Current User     Types: Chew   • Tobacco comment: 1*800*quit*now   Substance and Sexual Activity   • Alcohol use: Yes     Comment: occassionally   • Drug use: No   • Sexual activity: Defer           Objective      /85 (BP Location: Right arm, Patient Position: Sitting)   Pulse 69   Temp 98.2 °F (36.8 °C) (Temporal)   Resp 18   Ht 177.8 cm (70\")   Wt 104 kg (230 lb)   SpO2 98%   BMI 33.00 kg/m²     Physical Exam  Vitals signs and nursing note reviewed.   Constitutional:       General: He is not in acute distress.     Appearance: He is well-developed.   HENT:      Head: Normocephalic and atraumatic.      Mouth/Throat:      Mouth: Mucous membranes are moist.   Eyes:      Extraocular Movements: Extraocular movements intact.      Conjunctiva/sclera: Conjunctivae " normal.      Pupils: Pupils are equal, round, and reactive to light.   Cardiovascular:      Rate and Rhythm: Normal rate and regular rhythm.      Pulses: Normal pulses.      Heart sounds: Normal heart sounds.   Pulmonary:      Effort: Pulmonary effort is normal. No respiratory distress.      Breath sounds: Normal breath sounds. No wheezing.   Abdominal:      General: Abdomen is flat. Bowel sounds are normal.      Palpations: Abdomen is soft.   Skin:     General: Skin is warm and dry.      Capillary Refill: Capillary refill takes less than 2 seconds.   Neurological:      Mental Status: He is alert and oriented to person, place, and time.   Psychiatric:         Mood and Affect: Mood normal.         Behavior: Behavior normal.         Thought Content: Thought content normal.         Procedures           ED Course      Results for orders placed or performed during the hospital encounter of 02/03/21   Comprehensive Metabolic Panel    Specimen: Blood   Result Value Ref Range    Glucose 93 65 - 99 mg/dL    BUN 17 6 - 20 mg/dL    Creatinine 1.18 0.76 - 1.27 mg/dL    Sodium 137 136 - 145 mmol/L    Potassium 3.4 (L) 3.5 - 5.2 mmol/L    Chloride 104 98 - 107 mmol/L    CO2 26.0 22.0 - 29.0 mmol/L    Calcium 9.4 8.6 - 10.5 mg/dL    Total Protein 6.4 6.0 - 8.5 g/dL    Albumin 3.80 3.50 - 5.20 g/dL    ALT (SGPT) 23 1 - 41 U/L    AST (SGOT) 20 1 - 40 U/L    Alkaline Phosphatase 66 39 - 117 U/L    Total Bilirubin 0.5 0.0 - 1.2 mg/dL    eGFR Non African Amer 67 >60 mL/min/1.73    Globulin 2.6 gm/dL    A/G Ratio 1.5 g/dL    BUN/Creatinine Ratio 14.4 7.0 - 25.0    Anion Gap 7.0 5.0 - 15.0 mmol/L   CBC Auto Differential    Specimen: Blood   Result Value Ref Range    WBC 9.62 3.40 - 10.80 10*3/mm3    RBC 4.80 4.14 - 5.80 10*6/mm3    Hemoglobin 15.2 13.0 - 17.7 g/dL    Hematocrit 41.8 37.5 - 51.0 %    MCV 87.1 79.0 - 97.0 fL    MCH 31.7 26.6 - 33.0 pg    MCHC 36.4 (H) 31.5 - 35.7 g/dL    RDW 13.1 12.3 - 15.4 %    RDW-SD 41.1 37.0 - 54.0 fl     MPV 10.5 6.0 - 12.0 fL    Platelets 288 140 - 450 10*3/mm3    Neutrophil % 61.7 42.7 - 76.0 %    Lymphocyte % 27.8 19.6 - 45.3 %    Monocyte % 6.7 5.0 - 12.0 %    Eosinophil % 2.8 0.3 - 6.2 %    Basophil % 0.7 0.0 - 1.5 %    Immature Grans % 0.3 0.0 - 0.5 %    Neutrophils, Absolute 5.94 1.70 - 7.00 10*3/mm3    Lymphocytes, Absolute 2.67 0.70 - 3.10 10*3/mm3    Monocytes, Absolute 0.64 0.10 - 0.90 10*3/mm3    Eosinophils, Absolute 0.27 0.00 - 0.40 10*3/mm3    Basophils, Absolute 0.07 0.00 - 0.20 10*3/mm3    Immature Grans, Absolute 0.03 0.00 - 0.05 10*3/mm3    nRBC 0.0 0.0 - 0.2 /100 WBC          Ct Head Without Contrast    Result Date: 2/3/2021  Narrative: Noncontrast CT examination of the brain. INDICATION: Headache   Technique: Axial 5 mm contiguous images with brain parenchymal and bone windows This exam was performed according to our departmental dose-optimization program, which includes automated exposure control, adjustment of the mA and/or kV according to patient size and/or use of iterative reconstruction technique. Prior relevant examination: None. Brain parenchyma appears within normal limits. Ventricles are within normal limits in size. No evidence of abnormal mass or calcification is seen. No evidence of acute hemorrhage is noted. Bony structures appear within normal limits and the mastoid air cells and visualized paranasal sinuses are normally aerated.     Impression: 1. Normal brain for age. There are no acute changes.  Electronically signed by:  Ton Waldrop MD  2/3/2021 2:55 PM CST Workstation: [id number]    Discussed results with patient.  Gave educational materials.  Advised close follow up with PCP.  Return to emergency department for new or worsening symptoms.                                    MDM    Final diagnoses:   Nonintractable headache, unspecified chronicity pattern, unspecified headache type            Eh Concepcion PA-C  02/03/21 1504

## 2021-04-23 ENCOUNTER — HOSPITAL ENCOUNTER (EMERGENCY)
Facility: HOSPITAL | Age: 46
Discharge: LEFT BEFORE DISCHARGE | End: 2021-04-23
Attending: EMERGENCY MEDICINE

## 2021-04-23 VITALS
SYSTOLIC BLOOD PRESSURE: 128 MMHG | DIASTOLIC BLOOD PRESSURE: 94 MMHG | TEMPERATURE: 97.4 F | WEIGHT: 233 LBS | OXYGEN SATURATION: 98 % | HEIGHT: 70 IN | RESPIRATION RATE: 18 BRPM | HEART RATE: 54 BPM | BODY MASS INDEX: 33.36 KG/M2

## 2021-04-23 PROCEDURE — 99211 OFF/OP EST MAY X REQ PHY/QHP: CPT | Performed by: EMERGENCY MEDICINE

## 2021-04-24 NOTE — ED NOTES
"This RN to patient's room to triage and assess. Patient seen leaving department with registration. Patient overheard saying \"nobody has been in yet and I'm not gonna sit around and wait.\" Patient did not appear acutely distressed. Patient noted to be moving bilateral upper extremities equally. Ambulatory out of department with steady, even gait.      Michelle Sorensen, RN  04/23/21 2780    "

## 2021-08-24 PROBLEM — J44.9 COPD, MILD (HCC): Status: ACTIVE | Noted: 2020-12-15

## 2021-08-24 PROBLEM — G43.109 MIGRAINE WITH AURA AND WITHOUT STATUS MIGRAINOSUS, NOT INTRACTABLE: Status: ACTIVE | Noted: 2020-12-15

## 2021-08-24 PROBLEM — M54.40 LUMBAGO WITH SCIATICA: Status: ACTIVE | Noted: 2017-04-10

## 2021-08-24 PROBLEM — F19.10 SUBSTANCE ABUSE: Status: ACTIVE | Noted: 2019-11-04

## 2021-08-24 PROBLEM — R10.31 ABDOMINAL PAIN, RLQ: Status: ACTIVE | Noted: 2017-09-07

## 2021-08-24 PROBLEM — I10 ESSENTIAL (PRIMARY) HYPERTENSION: Status: ACTIVE | Noted: 2017-04-10

## 2021-08-24 PROCEDURE — 87635 SARS-COV-2 COVID-19 AMP PRB: CPT | Performed by: NURSE PRACTITIONER

## 2021-11-01 ENCOUNTER — TRANSCRIBE ORDERS (OUTPATIENT)
Dept: PHYSICAL THERAPY | Facility: HOSPITAL | Age: 46
End: 2021-11-01

## 2021-11-01 DIAGNOSIS — M75.40 IMPINGEMENT SYNDROME OF SHOULDER REGION, UNSPECIFIED LATERALITY: Primary | ICD-10-CM

## 2021-11-12 ENCOUNTER — HOSPITAL ENCOUNTER (EMERGENCY)
Facility: HOSPITAL | Age: 46
Discharge: HOME | End: 2021-11-12
Admitting: NURSE PRACTITIONER

## 2021-11-12 ENCOUNTER — TRANSCRIBE ORDERS (OUTPATIENT)
Dept: PHYSICAL THERAPY | Facility: HOSPITAL | Age: 46
End: 2021-11-12

## 2021-11-12 ENCOUNTER — TELEPHONE (OUTPATIENT)
Dept: ONCOLOGY | Facility: CLINIC | Age: 46
End: 2021-11-12

## 2021-11-12 VITALS
SYSTOLIC BLOOD PRESSURE: 132 MMHG | DIASTOLIC BLOOD PRESSURE: 95 MMHG | HEART RATE: 68 BPM | OXYGEN SATURATION: 97 % | RESPIRATION RATE: 20 BRPM | WEIGHT: 215 LBS | TEMPERATURE: 98.6 F | HEIGHT: 70 IN | BODY MASS INDEX: 30.78 KG/M2

## 2021-11-12 DIAGNOSIS — M75.41 IMPINGEMENT SYNDROME OF RIGHT SHOULDER: Primary | ICD-10-CM

## 2021-11-12 PROCEDURE — 99202 OFFICE O/P NEW SF 15 MIN: CPT

## 2021-11-12 PROCEDURE — 25010000002 INJECTION, CASIRIVIMAB AND IMDEVIMAB, 1200 MG: Performed by: NURSE PRACTITIONER

## 2021-11-12 PROCEDURE — M0243 CASIRIVI AND IMDEVI INFUSION: HCPCS | Performed by: NURSE PRACTITIONER

## 2021-11-12 RX ORDER — DIPHENHYDRAMINE HCL 50 MG
50 CAPSULE ORAL ONCE AS NEEDED
Status: DISCONTINUED | OUTPATIENT
Start: 2021-11-12 | End: 2021-11-12 | Stop reason: HOSPADM

## 2021-11-12 RX ORDER — DIPHENHYDRAMINE HYDROCHLORIDE 50 MG/ML
50 INJECTION INTRAMUSCULAR; INTRAVENOUS ONCE AS NEEDED
Status: DISCONTINUED | OUTPATIENT
Start: 2021-11-12 | End: 2021-11-12 | Stop reason: HOSPADM

## 2021-11-12 RX ORDER — SODIUM CHLORIDE 9 MG/ML
30 INJECTION, SOLUTION INTRAVENOUS ONCE
Status: COMPLETED | OUTPATIENT
Start: 2021-11-12 | End: 2021-11-12

## 2021-11-12 RX ORDER — METHYLPREDNISOLONE SODIUM SUCCINATE 125 MG/2ML
125 INJECTION, POWDER, LYOPHILIZED, FOR SOLUTION INTRAMUSCULAR; INTRAVENOUS ONCE AS NEEDED
Status: DISCONTINUED | OUTPATIENT
Start: 2021-11-12 | End: 2021-11-12 | Stop reason: HOSPADM

## 2021-11-12 RX ORDER — EPINEPHRINE 1 MG/ML
0.3 INJECTION, SOLUTION INTRAMUSCULAR; SUBCUTANEOUS ONCE AS NEEDED
Status: DISCONTINUED | OUTPATIENT
Start: 2021-11-12 | End: 2021-11-12 | Stop reason: HOSPADM

## 2021-11-12 RX ADMIN — SODIUM CHLORIDE 30 ML: 9 INJECTION, SOLUTION INTRAVENOUS at 14:36

## 2021-11-12 RX ADMIN — IMDEVIMAB: 1332 INJECTION, SOLUTION, CONCENTRATE INTRAVENOUS at 14:15

## 2021-12-01 ENCOUNTER — HOSPITAL ENCOUNTER (OUTPATIENT)
Dept: PHYSICAL THERAPY | Facility: HOSPITAL | Age: 46
Setting detail: THERAPIES SERIES
Discharge: HOME | End: 2021-12-01

## 2021-12-01 DIAGNOSIS — Z51.89 ENCOUNTER FOR OTHER SPECIFIED AFTERCARE: Primary | ICD-10-CM

## 2021-12-01 PROCEDURE — 97162 PT EVAL MOD COMPLEX 30 MIN: CPT

## 2021-12-01 NOTE — THERAPY EVALUATION
Outpatient Physical Therapy Ortho Initial Evaluation  [facility]     Patient Name: Marko Izaguirre  : 1975  MRN: [id number]  Today's Date: 2021      Visit Date: 2021     ATTENDANCE:   SUBJECTIVE IMPROVEMENT: not assessed at initial evaluation  NEXT MD APPOINTMENT: 2021  RECERT DATE: 2021    THERAPY DIAGNOSIS: R shoulder RTC repair, SAD, and ACR on 2021      Patient Active Problem List   Diagnosis   • Essential hypertension   • Migraine without aura and without status migrainosus, not intractable   • Seasonal allergies   • Mild intermittent asthma without complication   • Chronic bilateral low back pain with sciatica   • LUQ pain   • Gastroesophageal reflux disease   • Gastritis   • Polyp of colon   • Abdominal pain, RLQ   • Allergic rhinitis   • COPD, mild (HCC)   • Headache(784.0)   • Migraine with aura and without status migrainosus, not intractable   • Substance abuse (HCC)   • Lumbago with sciatica   • Essential (primary) hypertension        Past Medical History:   Diagnosis Date   • Asthma    • Back pain    • Backache    • Benign hypertension    • GERD (gastroesophageal reflux disease)    • Headache    • Hypertension    • Migraine    • Osteoarthritis of multiple joints    • Paresthesia of both hands    • Seasonal allergies    • Tobacco dependence syndrome         Past Surgical History:   Procedure Laterality Date   • COLONOSCOPY     • DENTAL PROCEDURE     • ENDOSCOPY N/A 2019    Procedure: ESOPHAGOGASTRODUODENOSCOPY;  Surgeon: Karen Sheikh MD;  Location: [facility] ENDOSCOPY;  Service: Gastroenterology   • UPPER GASTROINTESTINAL ENDOSCOPY  2019       Visit Dx:     ICD-10-CM ICD-9-CM   1. Encounter for other specified aftercare  Z51.89 V58.89          Patient History     Row Name 21 1300             History    Chief Complaint Pain  -AC      Brief Description of Current Complaint Inital DOI 2021. Pt was at work and pulled hard on a  heavy load to move it. He did not initally feel pain however the next day he was unable to move his arm much and it was very painful. He went to ER where x-rays were taken and he was put on light duty for 2-weeks at recheck mobility was still very limited so referred to ortho and MRI was completed. Pt tried injections and rest  however failed to improve so surgical intervention was completed. DOS 11/23/2021. Has been in sling since however not using all the time at home, notes does not remember being given precaustions after surgery. Is following up with pain managment for medication as needed. Retrun to MD 12/7/2021.  -AC      Previous treatment for THIS PROBLEM Injections; Surgery  -AC      Surgery Date: 11/23/21  -AC      Patient/Caregiver Goals Relieve pain; Return to prior level of function; Return to work; Improve strength; Improve mobility  -AC      Current Tobacco Use yes  -AC      Smoking Status PPD  -AC      Patient's Rating of General Health Fair  -AC      Hand Dominance right-handed  -AC      Occupation/sports/leisure activities [place]. Hobbies- fishing  -AC      How has patient tried to help current problem? injection and surgery  -AC      What clinical tests have you had for this problem? X-ray; MRI  -AC      Surgery/Hospitalization 11/23/2021  -AC              Pain     Pain Location Shoulder  -AC      Pain at Present 2  -AC      Pain at Best 2  -AC      Pain at Worst --  when pain medication wore off  -AC      Pain Frequency Constant/continuous  -AC      Pain Description Aching  -AC      What Performance Factors Make the Current Problem(s) WORSE? moving certian directions, pain medication wearing off  -AC      What Performance Factors Make the Current Problem(s) BETTER? pain medication  -AC      Is your sleep disturbed? Yes  -AC      Is medication used to assist with sleep? Yes  -AC      Difficulties at work? off work currently.  -AC      Difficulties with ADL's? notes diffiuclty  -AC               Daily Activities    Primary Language English  -            User Key  (r) = Recorded By, (t) = Taken By, (c) = Cosigned By    Initials Name Provider Type    AC Anna Beasley, PT Physical Therapist                 PT Ortho     Row Name 12/01/21 1300       Subjective Comments    Subjective Comments see pt hx.  -AC       Precautions and Contraindications    Precautions/Limitations no known precautions/limitations  -AC    Precautions per MD office- follow partial RTC repair protocol- [place] small tear protocol in media.  -AC       Subjective Pain    Able to rate subjective pain? yes  -AC    Pre-Treatment Pain Level 2  -AC       Posture/Observations    Posture/Observations Comments pt presents with sling donned. pt frequently using AROM throughout evlauatoin with occasional pain.  -AC       General ROM    RT Upper Ext Rt Shoulder ABduction; Rt Shoulder Extension; Rt Shoulder Flexion; Rt Shoulder External Rotation; Rt Shoulder Internal Rotation  -AC       Right Upper Ext    Rt Shoulder Abduction PROM 125  -AC    Rt Shoulder Flexion PROM 145  -AC    Rt Shoulder External Rotation PROM 34  -AC    Rt Shoulder Internal Rotation PROM 75  -AC       MMT (Manual Muscle Testing)    Rt Upper Ext Rt Shoulder Flexion; Rt Shoulder Extension; Rt Shoulder ABduction; Rt Shoulder ADduction; Rt Shoulder Internal Rotation; Rt Shoulder External Rotation  -AC    Lt Upper Ext Comments  -AC       MMT Right Upper Ext    Rt Upper Extremity Comments  deferred  -AC       MMT Left Upper Ext    Lt Upper Extremity Comments  grossly 5/5 no pain.  -AC        Strength Right    # Reps 3  -AC    Right Rung 2  -AC    Right  Test 1 90  -AC    Right  Test 2 85  -AC    Right  Test 3 90  -AC     Strength Average Right 88.33  -AC        Strength Left    # Reps 3  -AC    Left Rung 2  -AC    Left  Test 1 85  -AC    Left  Test 2 95  -AC    Left  Test 3 95  -AC     Strength Average Left 91.67  -AC       Sensation     Additional Comments no report of nubness or tingling at this time.  -AC       Hand  Strength     Strength Affected Side Bilateral  -          User Key  (r) = Recorded By, (t) = Taken By, (c) = Cosigned By    Initials Name Provider Type    Anna Amaya PT Physical Therapist                            Therapy Education  Education Details: protocol and activity restrictions. pendulums, scap squeezes, PROM shoulder flexion at countertop, bicep curls.  Given: HEP, Other (comment)  Program: New  How Provided: Verbal, Demonstration, Written  Provided to: Patient  Level of Understanding: Verbalized, Demonstrated      PT OP Goals     Row Name 12/01/21 1700          PT Short Term Goals    STG Date to Achieve 12/29/21  -     STG 1 Pt is indpt with HEP and compliant to protocol.  -AC     STG 1 Progress New  -     STG 2 Pt demo's PROM WNLs.  -     STG 2 Progress New  -AC            Long Term Goals    LTG Date to Achieve 01/26/22  -     LTG 1 QuickDASH improved to </= 25.  -AC     LTG 1 Progress New  -AC     LTG 2 Pt demo's RUE MMT 4+/5 or better in all planes.  -AC     LTG 2 Progress New  -AC     LTG 3 Pt demos R Shoulder AROM WNLs for all planes.  -AC     LTG 3 Progress New  -AC     LTG 4 Pt is carin to return to full work duties with minimal pain increase.  -     LTG 4 Progress New  -AC            Time Calculation    PT Goal Re-Cert Due Date 12/22/21  -           User Key  (r) = Recorded By, (t) = Taken By, (c) = Cosigned By    Initials Name Provider Type    Anna Amaya PT Physical Therapist                 PT Assessment/Plan     Row Name 12/01/21 1700          PT Assessment    Functional Limitations Limitation in home management; Limitations in community activities; Limitations in functional capacity and performance; Performance in leisure activities; Performance in self-care ADL; Performance in work activities  -AC     Impairments Pain; Muscle strength; Posture; Range of motion;  Impaired flexibility  -AC     Assessment Comments The pt is a 45 y/o male 1 week and 1 day s/p R shoulder labral tear debredement, partial RTC tear reconstruction , and acromioplasty conversion per MD surgical report. No restrictions noted on PT referral so PT called MD office during evaluation and was instructed to use parital RTC repair protocol. Will referance [facility] rotator cuff repain protocol until follow up, and progress per MD recommendations following. Pt presents to session wearing sling immobilizer however takes sling off and begin AROM of R shoulder to show PT what he has been doing at home, when prompted pt notes that no restrictions were given per his memory following surgery. PT provided extensive education given regarding protocol and tissue healing. Pt verbalized understanding of need for PROM only for first 3 weeks then we can slowly progress to AA/AROM however throughout evlauation pt continued to complete AROM R shoulder- PT continued to correct this activity. Pt presents today with limited PROM in all planes however pain only noted in IR/ER. Inscisions healing well with all sutures intact a this time an dno s/s of infection.  strength within 5 lbs from R to L with minimal pain increase noted today. true MMT testing deferred due to PROM only at this time. Pt will benefit from skilled PT to improve UE mobility and strength for return to work.  -AC     Rehab Potential Good  -AC     Patient/caregiver participated in establishment of treatment plan and goals Yes  -AC     Patient would benefit from skilled therapy intervention Yes  -AC            PT Plan    PT Frequency 2x/week  -AC     Predicted Duration of Therapy Intervention (PT) 6-8 weeks then more TBD  -AC     PT Plan Comments [facility] RTC repair protocol in media- follow unless otherwise advised by MD. progress note for MD beoftoño 12/7/2021- ask to clarify restrictions if able. UE mobility and strengthening per protocol.  can begin RTW activities once cleared by MD.  -AC           User Key  (r) = Recorded By, (t) = Taken By, (c) = Cosigned By    Initials Name Provider Type    Anna Amaya PT Physical Therapist                   OP Exercises     Row Name 12/01/21 1300             Subjective Comments    Subjective Comments see pt hx.  -AC              Subjective Pain    Able to rate subjective pain? yes  -      Pre-Treatment Pain Level 2  -            User Key  (r) = Recorded By, (t) = Taken By, (c) = Cosigned By    Initials Name Provider Type    Anna Amaya PT Physical Therapist                              Outcome Measure Options: Quick DASH  Quick DASH  Open a tight or new jar.: Severe Difficulty  Do heavy household chores (e.g., wash walls, wash floors): Mild Difficulty  Carry a shopping bag or briefcase: Severe Difficulty  Wash your back: Moderate Difficulty  Use a knife to cut food: No Difficulty  Recreational activities in which you take some force or impact through your arm, should or hand (e.g. golf, hammering, tennis, etc.): Unable  During the past week, to what extent has your arm, shoulder, or hand problem interfered with your normal social activites with family, friends, neighbors or groups?: Quite a bit  During the past week, were you limited in your work or other regular daily activities as a result of your arm, shoulder or hand problem?: Very limited  Arm, Shoulder, or hand pain: Moderate  Tingling (pins and needles) in your arm, shoulder, or hand: Severe  During the past week, how much difficulty have you had sleeping because of the pain in your arm, shoulder or hand?: Severe Difficulty  Number of Questions Answered: 11  Quick DASH Score: 61.36         Time Calculation:     Start Time: 1347  Stop Time: 1447  Time Calculation (min): 60 min  Untimed Charges  PT Eval/Re-eval Minutes: 60  Total Minutes  Untimed Charges Total Minutes: 60   Total Minutes: 60     Therapy Charges for Today     Code  Description Service Date Service Provider Modifiers Qty    58227731677  PT EVAL MOD COMPLEXITY 4 12/1/2021 Anna Beasley, PT GP 1                   Anna Beasley, PT  12/1/2021

## 2021-12-03 ENCOUNTER — HOSPITAL ENCOUNTER (OUTPATIENT)
Dept: PHYSICAL THERAPY | Facility: HOSPITAL | Age: 46
Setting detail: THERAPIES SERIES
Discharge: HOME | End: 2021-12-03

## 2021-12-03 DIAGNOSIS — Z51.89 ENCOUNTER FOR OTHER SPECIFIED AFTERCARE: Primary | ICD-10-CM

## 2021-12-03 PROCEDURE — 97110 THERAPEUTIC EXERCISES: CPT

## 2021-12-03 PROCEDURE — G0283 ELEC STIM OTHER THAN WOUND: HCPCS

## 2021-12-03 NOTE — THERAPY TREATMENT NOTE
Outpatient Physical Therapy Ortho Treatment Note  [facility]     Patient Name: Marko Izaguirre  : 1975  MRN: [id number]  Today's Date: 12/3/2021      Visit Date: 2021     Subjective Improvement: 0%  Attendance:  2/2 (12 approved)  Next MD Visit : 2021  Recert Date:  2021      Therapy Diagnosis:  R shoulder RTC repair, SAD, and ACR on 2021        Visit Dx:    ICD-10-CM ICD-9-CM   1. Encounter for other specified aftercare  Z51.89 V58.89       Patient Active Problem List   Diagnosis   • Essential hypertension   • Migraine without aura and without status migrainosus, not intractable   • Seasonal allergies   • Mild intermittent asthma without complication   • Chronic bilateral low back pain with sciatica   • LUQ pain   • Gastroesophageal reflux disease   • Gastritis   • Polyp of colon   • Abdominal pain, RLQ   • Allergic rhinitis   • COPD, mild (HCC)   • Headache(784.0)   • Migraine with aura and without status migrainosus, not intractable   • Substance abuse (HCC)   • Lumbago with sciatica   • Essential (primary) hypertension        Past Medical History:   Diagnosis Date   • Asthma    • Back pain    • Backache    • Benign hypertension    • GERD (gastroesophageal reflux disease)    • Headache    • Hypertension    • Migraine    • Osteoarthritis of multiple joints    • Paresthesia of both hands    • Seasonal allergies    • Tobacco dependence syndrome         Past Surgical History:   Procedure Laterality Date   • COLONOSCOPY     • DENTAL PROCEDURE     • ENDOSCOPY N/A 2019    Procedure: ESOPHAGOGASTRODUODENOSCOPY;  Surgeon: Karen Sheikh MD;  Location: [facility] ENDOSCOPY;  Service: Gastroenterology   • UPPER GASTROINTESTINAL ENDOSCOPY  2019        PT Ortho     Row Name 21 0800       Precautions and Contraindications    Precautions/Limitations no known precautions/limitations  -KH    Precautions per MD office- follow partial RTC repair protocol- [place]  small tear protocol in media.  -SHERI       Posture/Observations    Posture/Observations Comments presents with sling on R UE; stitches intact; full PROM noted  -          User Key  (r) = Recorded By, (t) = Taken By, (c) = Cosigned By    Initials Name Provider Type    Jessa Jay PTA Physical Therapy Assistant                             PT Assessment/Plan     Row Name 12/03/21 0900          PT Assessment    Functional Limitations Limitation in home management; Limitations in community activities; Limitations in functional capacity and performance; Performance in leisure activities; Performance in self-care ADL; Performance in work activities  -     Impairments Pain; Muscle strength; Posture; Range of motion; Impaired flexibility  -     Assessment Comments 1 wk 3 day post surgery; more education given with patient this date about his procedure; Pt brought pictures in and from the looks there is no sutures noted but will continue with small RTC protocol progression until MD visit next wednesday; Had full PROM noted this date and did well with progression of exercises; No pain with any and seems to be progressing well and have good outcomes with PT;  -SHERI     Rehab Potential Good  -     Patient/caregiver participated in establishment of treatment plan and goals Yes  -SHERI     Patient would benefit from skilled therapy intervention Yes  -KH            PT Plan    PT Frequency 2x/week  -SHERI     Predicted Duration of Therapy Intervention (PT) 6-8 weeks then more TBD  -     PT Plan Comments MD note next visit; progress per small RTC repair protocol until MD visit next week; Add putty to HEP next visit;  -           User Key  (r) = Recorded By, (t) = Taken By, (c) = Cosigned By    Initials Name Provider Type    Jessa Jay PTA Physical Therapy Assistant                 Modalities     Row Name 12/03/21 0800             Ice    Ice Applied Yes  -SHERI      Location R shoulder with IFC  -SHERI      Ice S/P Rx Yes  -KH               ELECTRICAL STIMULATION    Attended/Unattended Unattended  -      Stimulation Type IFC  -      Location/Electrode Placement/Other R shoulder with ICE  -      PT E-Stim Unattended Minutes 15  -KH            User Key  (r) = Recorded By, (t) = Taken By, (c) = Cosigned By    Initials Name Provider Type    Jessa Jay PTA Physical Therapy Assistant               OP Exercises     Row Name 12/03/21 0800             Subjective Comments    Subjective Comments patient reports that his shoulder is doing well and states that he is doing well with exercises at home;  -              Subjective Pain    Able to rate subjective pain? yes  -KH      Pre-Treatment Pain Level 2  -KH      Post-Treatment Pain Level 0  -KH              Exercise 1    Exercise Name 1 AROM elbow flexion & extension  -      Reps 1 20  -KH              Exercise 2    Exercise Name 2 AROM wrist flexion and extension  -      Reps 2 20 each  -KH              Exercise 3    Exercise Name 3 AROM forearm pronation and supination  -      Reps 3 20  -KH              Exercise 4    Exercise Name 4 scap squeezes  -      Sets 4 2  -KH      Reps 4 10  -KH              Exercise 5    Exercise Name 5 shoulder shurgs  -      Cueing 5 Verbal  -KH      Sets 5 2  -KH      Reps 5 10  -KH              Exercise 6    Exercise Name 6 table walk aways both flexion and abd  -KH      Cueing 6 Verbal  -KH      Sets 6 2  -KH      Reps 6 10 each  -KH              Exercise 7    Exercise Name 7 PROM to R shoulder  -KH      Time 7 10 mins  -      Additional Comments full PROM noted  -            User Key  (r) = Recorded By, (t) = Taken By, (c) = Cosigned By    Initials Name Provider Type    Jessa Jay PTA Physical Therapy Assistant                              PT OP Goals     Row Name 12/03/21 0900          PT Short Term Goals    STG Date to Achieve 12/29/21  -     STG 1 Pt is indpt with HEP and compliant to protocol.  -     STG 1 Progress Ongoing  -      STG 2 Pt demo's PROM WNLs.  -     STG 2 Progress Met  -            Long Term Goals    LTG Date to Achieve 01/26/22  -     LTG 1 QuickDASH improved to </= 25.  -     LTG 2 Pt demo's RUE MMT 4+/5 or better in all planes.  -     LTG 3 Pt demos R Shoulder AROM WNLs for all planes.  -     LTG 4 Pt is carin to return to full work duties with minimal pain increase.  -            Time Calculation    PT Goal Re-Cert Due Date 12/22/21  -           User Key  (r) = Recorded By, (t) = Taken By, (c) = Cosigned By    Initials Name Provider Type    Jessa Jay PTA Physical Therapy Assistant                               Time Calculation:   Start Time: 0842  Stop Time: 0935  Time Calculation (min): 53 min  Untimed Charges  PT E-Stim Unattended Minutes: 15  Total Minutes  Untimed Charges Total Minutes: 15   Total Minutes: 15  Therapy Charges for Today     Code Description Service Date Service Provider Modifiers Qty    71736350323 HC PT THER SUPP EA 15 MIN 12/3/2021 Jessa Marin PTA GP 1    36933154362 HC PT ELECTRICAL STIM UNATTENDED 12/3/2021 Jessa Marin PTA  1    30347298926 HC PT THER PROC EA 15 MIN 12/3/2021 Jessa Marin PTA GP 3                    Jessa Marin PTA  12/3/2021

## 2021-12-07 ENCOUNTER — HOSPITAL ENCOUNTER (OUTPATIENT)
Dept: PHYSICAL THERAPY | Facility: HOSPITAL | Age: 46
Setting detail: THERAPIES SERIES
Discharge: HOME | End: 2021-12-07

## 2021-12-07 DIAGNOSIS — Z51.89 ENCOUNTER FOR OTHER SPECIFIED AFTERCARE: Primary | ICD-10-CM

## 2021-12-07 PROCEDURE — G0283 ELEC STIM OTHER THAN WOUND: HCPCS

## 2021-12-07 PROCEDURE — 97110 THERAPEUTIC EXERCISES: CPT

## 2021-12-07 NOTE — THERAPY TREATMENT NOTE
Outpatient Physical Therapy Ortho Treatment Note  [facility]     Patient Name: Marko Izaguirre  : 1975  MRN: [id number]  Today's Date: 2021      Visit Date: 2021     Subjective Improvement: 0%  Attendance:  3/3 (12 approved)  Next MD Visit : 2021  Recert Date:  2021        Therapy Diagnosis:  R shoulder RTC repair, SAD, and ACR on 2021    Visit Dx:    ICD-10-CM ICD-9-CM   1. Encounter for other specified aftercare  Z51.89 V58.89       Patient Active Problem List   Diagnosis   • Essential hypertension   • Migraine without aura and without status migrainosus, not intractable   • Seasonal allergies   • Mild intermittent asthma without complication   • Chronic bilateral low back pain with sciatica   • LUQ pain   • Gastroesophageal reflux disease   • Gastritis   • Polyp of colon   • Abdominal pain, RLQ   • Allergic rhinitis   • COPD, mild (HCC)   • Headache(784.0)   • Migraine with aura and without status migrainosus, not intractable   • Substance abuse (HCC)   • Lumbago with sciatica   • Essential (primary) hypertension        Past Medical History:   Diagnosis Date   • Asthma    • Back pain    • Backache    • Benign hypertension    • GERD (gastroesophageal reflux disease)    • Headache    • Hypertension    • Migraine    • Osteoarthritis of multiple joints    • Paresthesia of both hands    • Seasonal allergies    • Tobacco dependence syndrome         Past Surgical History:   Procedure Laterality Date   • COLONOSCOPY     • DENTAL PROCEDURE     • ENDOSCOPY N/A 2019    Procedure: ESOPHAGOGASTRODUODENOSCOPY;  Surgeon: Karen Sheikh MD;  Location: [facility] ENDOSCOPY;  Service: Gastroenterology   • UPPER GASTROINTESTINAL ENDOSCOPY  2019        PT Ortho     Row Name 21 1000       Precautions and Contraindications    Precautions/Limitations no known precautions/limitations  -KH    Precautions per MD office- follow partial RTC repair protocol- [place]  small tear protocol in media.  -       Posture/Observations    Posture/Observations Comments presents with sling; stitches intact--FULL PROM all planes; tightness in medial delt and bicep  -          User Key  (r) = Recorded By, (t) = Taken By, (c) = Cosigned By    Initials Name Provider Type    Jessa Jay PTA Physical Therapy Assistant                             PT Assessment/Plan     Row Name 12/07/21 1000          PT Assessment    Functional Limitations Limitation in home management; Limitations in community activities; Limitations in functional capacity and performance; Performance in leisure activities; Performance in self-care ADL; Performance in work activities  -     Impairments Pain; Muscle strength; Posture; Range of motion; Impaired flexibility  -     Assessment Comments 2 wks post; MD note sent with patient; luis no progression until clarification from MD: Full PROM noted in the R UE: responded well to STM to help with tightness in R bicep and deltoids  -     Rehab Potential Good  -     Patient/caregiver participated in establishment of treatment plan and goals Yes  -     Patient would benefit from skilled therapy intervention Yes  -            PT Plan    PT Frequency 2x/week  -     Predicted Duration of Therapy Intervention (PT) 6-8 weeks then more TBD  -     PT Plan Comments Continue to progress as MD allows; Progress strength and ROM depending on procedure clarification  -           User Key  (r) = Recorded By, (t) = Taken By, (c) = Cosigned By    Initials Name Provider Type    Jessa Jay PTA Physical Therapy Assistant                 Modalities     Row Name 12/07/21 1000             Subjective Pain    Post-Treatment Pain Level 0  -KH              Ice    Ice Applied Yes  -      Location R shoulder with [facility]  -      Ice S/P Rx Yes  -              ELECTRICAL STIMULATION    Attended/Unattended Unattended  -      Stimulation Type IFC  -KH      Location/Electrode  Placement/Other R shoulder with ICE  -            User Key  (r) = Recorded By, (t) = Taken By, (c) = Cosigned By    Initials Name Provider Type    Jessa Jay PTA Physical Therapy Assistant               OP Exercises     Row Name 12/07/21 1000             Subjective Comments    Subjective Comments Patient reports increased pain since Monday secondary to doing exercises and shoulder popped in the posterior shoulder; reports that it is like a achy pain;  -              Subjective Pain    Able to rate subjective pain? yes  -      Pre-Treatment Pain Level 7  -      Post-Treatment Pain Level 0  -              Exercise 1    Exercise Name 1 Pendulums 4 way  -      Reps 1 20 each  -KH              Exercise 2    Exercise Name 2 AROM elbow flexion & extension  -      Reps 2 20 each  -              Exercise 3    Exercise Name 3 table walk aways  -      Reps 3 20  -KH              Exercise 4    Exercise Name 4 scap squeezes  -      Sets 4 2  -KH      Reps 4 10  -KH              Exercise 5    Exercise Name 5 shoulder shurgs  -      Cueing 5 Verbal  -      Sets 5 2  -      Reps 5 10  -KH              Exercise 6    Exercise Name 6 Manual: PROM and STM to bicep and RTC  -            User Key  (r) = Recorded By, (t) = Taken By, (c) = Cosigned By    Initials Name Provider Type    Jessa Jay PTA Physical Therapy Assistant                              PT OP Goals     Row Name 12/07/21 1000          PT Short Term Goals    STG Date to Achieve 12/29/21  -     STG 1 Pt is indpt with HEP and compliant to protocol.  -     STG 1 Progress Ongoing  -     STG 2 Pt demo's PROM WNLs.  -     STG 2 Progress Met  -            Long Term Goals    LTG Date to Achieve 01/26/22  -     LTG 1 QuickDASH improved to </= 25.  -     LTG 2 Pt demo's RUE MMT 4+/5 or better in all planes.  -     LTG 3 Pt demos R Shoulder AROM WNLs for all planes.  -     LTG 4 Pt is carin to return to full work duties with  minimal pain increase.  -SHERI            Time Calculation    PT Goal Re-Cert Due Date 12/22/21  -SHERI           User Key  (r) = Recorded By, (t) = Taken By, (c) = Cosigned By    Initials Name Provider Type    Jessa Jay PTA Physical Therapy Assistant                               Time Calculation:   Start Time: 1015  Stop Time: 1100  Time Calculation (min): 45 min  Therapy Charges for Today     Code Description Service Date Service Provider Modifiers Qty    34903748884 HC PT THER SUPP EA 15 MIN 12/7/2021 Jessa Marin PTA GP 1    51473945036 HC PT ELECTRICAL STIM UNATTENDED 12/7/2021 Jessa Marin PTA  1    42586378696 HC PT THER PROC EA 15 MIN 12/7/2021 Jessa Marin PTA GP 2                    Jessa Marin PTA  12/7/2021

## 2021-12-09 ENCOUNTER — HOSPITAL ENCOUNTER (OUTPATIENT)
Dept: PHYSICAL THERAPY | Facility: HOSPITAL | Age: 46
Setting detail: THERAPIES SERIES
Discharge: HOME | End: 2021-12-09

## 2021-12-09 DIAGNOSIS — M75.41 IMPINGEMENT SYNDROME OF RIGHT SHOULDER: Primary | ICD-10-CM

## 2021-12-09 PROCEDURE — 97140 MANUAL THERAPY 1/> REGIONS: CPT

## 2021-12-09 PROCEDURE — 97110 THERAPEUTIC EXERCISES: CPT

## 2021-12-09 PROCEDURE — G0283 ELEC STIM OTHER THAN WOUND: HCPCS

## 2021-12-09 NOTE — THERAPY TREATMENT NOTE
Outpatient Physical Therapy Ortho Treatment Note  [facility]     Patient Name: Marko Izaguirre  : 1975  MRN: [id number]  Today's Date: 2021      Visit Date: 2021     ATTENDANCE:   SUBJECTIVE IMPROVEMENT: not assessed today  NEXT MD APPOINTMENT: JULIETA  RECERT DATE: 2021    THERAPY DIAGNOSIS: R JUAN/rudy procedure on 2021        Visit Dx:    ICD-10-CM ICD-9-CM   1. Impingement syndrome of right shoulder  M75.41 726.2       Patient Active Problem List   Diagnosis   • Essential hypertension   • Migraine without aura and without status migrainosus, not intractable   • Seasonal allergies   • Mild intermittent asthma without complication   • Chronic bilateral low back pain with sciatica   • LUQ pain   • Gastroesophageal reflux disease   • Gastritis   • Polyp of colon   • Abdominal pain, RLQ   • Allergic rhinitis   • COPD, mild (HCC)   • Headache(784.0)   • Migraine with aura and without status migrainosus, not intractable   • Substance abuse (HCC)   • Lumbago with sciatica   • Essential (primary) hypertension        Past Medical History:   Diagnosis Date   • Asthma    • Back pain    • Backache    • Benign hypertension    • GERD (gastroesophageal reflux disease)    • Headache    • Hypertension    • Migraine    • Osteoarthritis of multiple joints    • Paresthesia of both hands    • Seasonal allergies    • Tobacco dependence syndrome         Past Surgical History:   Procedure Laterality Date   • COLONOSCOPY     • DENTAL PROCEDURE     • ENDOSCOPY N/A 2019    Procedure: ESOPHAGOGASTRODUODENOSCOPY;  Surgeon: Karen Sheikh MD;  Location: [facility] ENDOSCOPY;  Service: Gastroenterology   • UPPER GASTROINTESTINAL ENDOSCOPY  2019        PT Ortho     Row Name 21 1100       Precautions and Contraindications    Precautions/Limitations no known precautions/limitations  -AC    Precautions per MD note- follow JUAN/rudy, no true restrictions.  -AC        Posture/Observations    Posture/Observations Comments no sling this date. mild forward head and rounded shoulders  -AC          User Key  (r) = Recorded By, (t) = Taken By, (c) = Cosigned By    Initials Name Provider Type    Anna Amaya, PT Physical Therapist                             PT Assessment/Plan     Row Name 12/09/21 1100          PT Assessment    Assessment Comments treatment tolerated well today with indtroduction fo rAA/AROM activities. good tolerance with all activities. most pain iwht shoulder abduction/adduction today. continues to have biceps tendon tightness/trigger point tenderness.  -AC            PT Plan    PT Frequency 2x/week  -AC     Predicted Duration of Therapy Intervention (PT) 6-8 weeks then TBD  -AC     PT Plan Comments continue to progress AROM/gentle strength as tolerated. JUAN/rudy only per MD.  -AC           User Key  (r) = Recorded By, (t) = Taken By, (c) = Cosigned By    Initials Name Provider Type    Anna Amaya, PT Physical Therapist                 Modalities     Row Name 12/09/21 1100             Ice    Ice Applied Yes  -AC      Location R shoulder with IFC  -AC      Ice S/P Rx Yes  -AC              ELECTRICAL STIMULATION    Attended/Unattended Unattended  -AC      Stimulation Type IFC  -AC      Location/Electrode Placement/Other R shoulder with ICE  -AC      PT E-Stim Unattended Minutes 15  -AC            User Key  (r) = Recorded By, (t) = Taken By, (c) = Cosigned By    Initials Name Provider Type    Anna Amaya, PT Physical Therapist               OP Exercises     Row Name 12/09/21 1100             Subjective Comments    Subjective Comments Pt notes that he went to MD yesterday who noted that no cuff repair was completed with surgery and MD note states just JUAN/rudy to follow for protocol. Pt notes that he was fixing furnace last night/this morning which has shoulder signficiantly increased pain. Pt notes no longer wearing sling.  -AC               Subjective Pain    Able to rate subjective pain? yes  -AC      Pre-Treatment Pain Level 9  -AC              Total Minutes    44813 - PT Therapeutic Exercise Minutes 27  -AC      44128 - PT Manual Therapy Minutes 8  -AC              Exercise 1    Exercise Name 1 Pro II- L1 AAROM  -AC      Time 1 5 min fdw/5 min back  -AC              Exercise 2    Exercise Name 2 wand shoulder flexion  -AC      Sets 2 1  -AC      Reps 2 20  -AC              Exercise 3    Exercise Name 3 wand shoulder abduction  -AC      Sets 3 1  -AC      Reps 3 20  -AC      Additional Comments mild pain with coming back in adduction. pt notes some popping/catching towards end of activity.  -AC              Exercise 4    Exercise Name 4 wand ER  -AC      Sets 4 1  -AC      Reps 4 20  -AC              Exercise 5    Exercise Name 5 supine no money  -AC      Sets 5 1  -AC      Reps 5 20  -AC              Exercise 6    Exercise Name 6 supine shoulder flexoin to 90  -AC      Sets 6 1  -AC      Reps 6 20  -AC              Exercise 7    Exercise Name 7 SL shoulder abduction to 90  -AC      Sets 7 1  -AC      Reps 7 20  -AC              Exercise 8    Exercise Name 8 SL shoulder ER  -AC      Sets 8 1  -AC      Reps 8 20  -AC              Exercise 9    Exercise Name 9 see manual  -AC              Exercise 10    Exercise Name 10 see modalities  -AC            User Key  (r) = Recorded By, (t) = Taken By, (c) = Cosigned By    Initials Name Provider Type    AC Anna Beasley, PT Physical Therapist                         Manual Rx (last 36 hours)     Manual Treatments     Row Name 12/09/21 1100             Total Minutes    88837 - PT Manual Therapy Minutes 8  -AC              Manual Rx 1    Manual Rx 1 Location R biceps/delt  -AC      Manual Rx 1 Type manual trigger point release and STM  -AC      Manual Rx 1 Duration 8 min  -AC            User Key  (r) = Recorded By, (t) = Taken By, (c) = Cosigned By    Initials Name Provider Type    SALLY Beasley  Anna, PT Physical Therapist                 PT OP Goals     Row Name 12/09/21 1100          PT Short Term Goals    STG Date to Achieve --  -AC     STG 1 Pt is indpt with HEP and compliant to protocol.  -AC     STG 1 Progress Ongoing  -AC     STG 2 Pt demo's PROM WNLs.  -AC     STG 2 Progress Met  -AC            Long Term Goals    LTG Date to Achieve 01/26/22  -AC     LTG 1 QuickDASH improved to </= 25.  -AC     LTG 1 Progress Ongoing  -AC     LTG 2 Pt demo's RUE MMT 4+/5 or better in all planes.  -AC     LTG 2 Progress Ongoing  -AC     LTG 3 Pt demos R Shoulder AROM WNLs for all planes.  -AC     LTG 3 Progress Ongoing  -AC     LTG 4 Pt is carin to return to full work duties with minimal pain increase.  -AC     LTG 4 Progress Ongoing  -AC            Time Calculation    PT Goal Re-Cert Due Date 12/22/21  -           User Key  (r) = Recorded By, (t) = Taken By, (c) = Cosigned By    Initials Name Provider Type    AC Anna Beasley, PT Physical Therapist                               Time Calculation:   Start Time: 1100  Stop Time: 1150  Time Calculation (min): 50 min  Timed Charges  46980 - PT Therapeutic Exercise Minutes: 27  35254 - PT Manual Therapy Minutes: 8  Untimed Charges  PT E-Stim Unattended Minutes: 15  Total Minutes  Timed Charges Total Minutes: 35  Untimed Charges Total Minutes: 15   Total Minutes: 50  Therapy Charges for Today     Code Description Service Date Service Provider Modifiers Qty    03700833816 HC PT THER PROC EA 15 MIN 12/9/2021 Anna Beasley, PT GP 1    87408352674 HC PT ELECTRICAL STIM UNATTENDED 12/9/2021 Anna Beasley, PT  1    57965677771 HC PT MANUAL THERAPY EA 15 MIN 12/9/2021 Anna Beasley, PT GP 1                    Anna Beasley PT  12/9/2021

## 2021-12-14 ENCOUNTER — HOSPITAL ENCOUNTER (OUTPATIENT)
Dept: PHYSICAL THERAPY | Facility: HOSPITAL | Age: 46
Setting detail: THERAPIES SERIES
Discharge: HOME | End: 2021-12-14

## 2021-12-14 DIAGNOSIS — Z51.89 ENCOUNTER FOR OTHER SPECIFIED AFTERCARE: ICD-10-CM

## 2021-12-14 DIAGNOSIS — M75.41 IMPINGEMENT SYNDROME OF RIGHT SHOULDER: Primary | ICD-10-CM

## 2021-12-14 PROCEDURE — 97110 THERAPEUTIC EXERCISES: CPT

## 2021-12-14 PROCEDURE — G0283 ELEC STIM OTHER THAN WOUND: HCPCS

## 2021-12-14 NOTE — THERAPY TREATMENT NOTE
Outpatient Physical Therapy Ortho Treatment Note  [facility]     Patient Name: Marko Izaguirre  : 1975  MRN: [id number]  Today's Date: 2021      Visit Date: 2021      Subjective Improvement: 0%  Attendance:   (12 approved)  Next MD Visit : 2022  Recert Date:  2021        Therapy Diagnosis:  R shoulder RTC repair, SAD, and ACR on 2021     Visit Dx:    ICD-10-CM ICD-9-CM   1. Impingement syndrome of right shoulder  M75.41 726.2   2. Encounter for other specified aftercare  Z51.89 V58.89       Patient Active Problem List   Diagnosis   • Essential hypertension   • Migraine without aura and without status migrainosus, not intractable   • Seasonal allergies   • Mild intermittent asthma without complication   • Chronic bilateral low back pain with sciatica   • LUQ pain   • Gastroesophageal reflux disease   • Gastritis   • Polyp of colon   • Abdominal pain, RLQ   • Allergic rhinitis   • COPD, mild (HCC)   • Headache(784.0)   • Migraine with aura and without status migrainosus, not intractable   • Substance abuse (HCC)   • Lumbago with sciatica   • Essential (primary) hypertension        Past Medical History:   Diagnosis Date   • Asthma    • Back pain    • Backache    • Benign hypertension    • GERD (gastroesophageal reflux disease)    • Headache    • Hypertension    • Migraine    • Osteoarthritis of multiple joints    • Paresthesia of both hands    • Seasonal allergies    • Tobacco dependence syndrome         Past Surgical History:   Procedure Laterality Date   • COLONOSCOPY     • DENTAL PROCEDURE     • ENDOSCOPY N/A 2019    Procedure: ESOPHAGOGASTRODUODENOSCOPY;  Surgeon: Karen Sheikh MD;  Location: [facility] ENDOSCOPY;  Service: Gastroenterology   • UPPER GASTROINTESTINAL ENDOSCOPY  2019        PT Ortho     Row Name 21 1400       Precautions and Contraindications    Precautions/Limitations no known precautions/limitations  -KH    Precautions per  MD note- follow SAD/rudy, no true restrictions.   -       Subjective Pain    Post-Treatment Pain Level 0  -       Posture/Observations    Posture/Observations Comments no sling; full AROM noted R shoulder  -          User Key  (r) = Recorded By, (t) = Taken By, (c) = Cosigned By    Initials Name Provider Type    Jessa Jay PTA Physical Therapy Assistant                             PT Assessment/Plan     Row Name 12/14/21 1400          PT Assessment    Functional Limitations Limitation in home management; Limitations in community activities; Limitations in functional capacity and performance; Performance in leisure activities; Performance in self-care ADL; Performance in work activities  -     Impairments Pain; Muscle strength; Posture; Range of motion; Impaired flexibility  -     Assessment Comments 3 wks post SAD/Rudy; Added tband rows, lat pulls  and tband 6 way to HEP did well with progression of strength without pain; progressing towards goals;  -     Rehab Potential Good  -     Patient/caregiver participated in establishment of treatment plan and goals Yes  -     Patient would benefit from skilled therapy intervention Yes  -            PT Plan    PT Frequency 2x/week  -     Predicted Duration of Therapy Intervention (PT) 6-8 weeks then TBD  -     PT Plan Comments Continue to progress with strength and ROM in the right shoulder as pain allows;  -           User Key  (r) = Recorded By, (t) = Taken By, (c) = Cosigned By    Initials Name Provider Type    Jessa Jay PTA Physical Therapy Assistant                 Modalities     Row Name 12/14/21 1400             Ice    Ice Applied Yes  -      Location R shoulder with [facility]  -      Ice S/P Rx Yes  -              ELECTRICAL STIMULATION    Attended/Unattended Unattended  -      Stimulation Type IFC  -KH      Location/Electrode Placement/Other R shoulder with ICE  -      PT E-Stim Unattended Minutes 15  -KH            User  Key  (r) = Recorded By, (t) = Taken By, (c) = Cosigned By    Initials Name Provider Type    Jessa Jay PTA Physical Therapy Assistant               OP Exercises     Row Name 12/14/21 1400             Subjective Comments    Subjective Comments patient reports that his shoulder is not hurting to bad today; Did well over the weekend and not wearing sling at home; sometimes he puts it on to rest it but otherwise he is doing well;  -KH              Subjective Pain    Able to rate subjective pain? yes  -KH      Pre-Treatment Pain Level 2  -KH      Post-Treatment Pain Level 0  -KH              Exercise 1    Exercise Name 1 pro ll UE strength/ROM  -KH      Time 1 10 mins  -KH      Additional Comments F/B seat 9; level 2  -KH              Exercise 2    Exercise Name 2 pulleys flexion & abduction  -KH      Sets 2 2  -KH      Reps 2 10 each  -KH              Exercise 3    Exercise Name 3 Tband High & Mid Rows  -KH      Sets 3 2  -KH      Reps 3 10  -KH      Additional Comments green  -KH              Exercise 4    Exercise Name 4 Tband Lat Pulls  -KH      Cueing 4 Verbal  -KH      Sets 4 2  -KH      Reps 4 10  -KH      Additional Comments green  -KH              Exercise 5    Exercise Name 5 Tband Shoulder 6 way  -KH      Sets 5 2  -KH      Reps 5 10 each  -KH      Additional Comments green  -KH              Exercise 6    Exercise Name 6 --  -KH      Sets 6 --  -KH      Reps 6 --  -KH              Exercise 7    Exercise Name 7 --  -KH      Sets 7 --  -KH      Reps 7 --  -KH            User Key  (r) = Recorded By, (t) = Taken By, (c) = Cosigned By    Initials Name Provider Type    Jessa Jay PTA Physical Therapy Assistant                              PT OP Goals     Row Name 12/14/21 1400          PT Short Term Goals    STG 1 Pt is indpt with HEP and compliant to protocol.  -KH     STG 1 Progress Ongoing  -KH     STG 2 Pt demo's PROM WNLs.  -     STG 2 Progress Met  -KH            Long Term Goals    LTG Date to  Achieve 01/26/22  -     LTG 1 QuickDASH improved to </= 25.  -     LTG 1 Progress Ongoing  -     LTG 2 Pt demo's RUE MMT 4+/5 or better in all planes.  -     LTG 2 Progress Ongoing  -     LTG 3 Pt demos R Shoulder AROM WNLs for all planes.  -     LTG 3 Progress Met   -     LTG 4 Pt is crain to return to full work duties with minimal pain increase.  -     LTG 4 Progress Ongoing  -            Time Calculation    PT Goal Re-Cert Due Date 12/22/21  -           User Key  (r) = Recorded By, (t) = Taken By, (c) = Cosigned By    Initials Name Provider Type    Jessa Jay PTA Physical Therapy Assistant                Therapy Education  Education Details: HEP: tband high & mid rows, lat pulls and tband 6 way  Given: HEP  Program: New  How Provided: Verbal, Demonstration, Written  Provided to: Patient  Level of Understanding: Teach back education performed, Verbalized, Demonstrated              Time Calculation:   Start Time: 1429  Stop Time: 1526  Time Calculation (min): 57 min  Untimed Charges  PT E-Stim Unattended Minutes: 15  Total Minutes  Untimed Charges Total Minutes: 15   Total Minutes: 15  Therapy Charges for Today     Code Description Service Date Service Provider Modifiers Qty    92697440575 HC PT THER SUPP EA 15 MIN 12/14/2021 Jessa Marin PTA GP 1    56123201841 HC PT ELECTRICAL STIM UNATTENDED 12/14/2021 Jessa Marin PTA  1    37629289924 HC PT THER PROC EA 15 MIN 12/14/2021 Jessa Marin PTA GP 3                    Jessa Marin PTA  12/14/2021

## 2021-12-16 ENCOUNTER — HOSPITAL ENCOUNTER (OUTPATIENT)
Dept: PHYSICAL THERAPY | Facility: HOSPITAL | Age: 46
Setting detail: THERAPIES SERIES
Discharge: HOME | End: 2021-12-16

## 2021-12-16 DIAGNOSIS — M75.41 IMPINGEMENT SYNDROME OF RIGHT SHOULDER: Primary | ICD-10-CM

## 2021-12-16 PROCEDURE — G0283 ELEC STIM OTHER THAN WOUND: HCPCS

## 2021-12-16 PROCEDURE — 97110 THERAPEUTIC EXERCISES: CPT

## 2021-12-16 NOTE — THERAPY TREATMENT NOTE
Outpatient Physical Therapy Ortho Treatment Note  [facility]     Patient Name: Marko Izaguirre  : 1975  MRN: [id number]  Today's Date: 2021      Visit Date: 2021     ATTENDANCE:  (12 approved)  SUBJECTIVE IMPROVEMENT: not assessed  NEXT MD APPOINTMENT: 2021  RECERT DATE: 2021    THERAPY DIAGNOSIS: R shoulder SAD/rudy 2021        Visit Dx:    ICD-10-CM ICD-9-CM   1. Impingement syndrome of right shoulder  M75.41 726.2       Patient Active Problem List   Diagnosis   • Essential hypertension   • Migraine without aura and without status migrainosus, not intractable   • Seasonal allergies   • Mild intermittent asthma without complication   • Chronic bilateral low back pain with sciatica   • LUQ pain   • Gastroesophageal reflux disease   • Gastritis   • Polyp of colon   • Abdominal pain, RLQ   • Allergic rhinitis   • COPD, mild (HCC)   • Headache(784.0)   • Migraine with aura and without status migrainosus, not intractable   • Substance abuse (HCC)   • Lumbago with sciatica   • Essential (primary) hypertension        Past Medical History:   Diagnosis Date   • Asthma    • Back pain    • Backache    • Benign hypertension    • GERD (gastroesophageal reflux disease)    • Headache    • Hypertension    • Migraine    • Osteoarthritis of multiple joints    • Paresthesia of both hands    • Seasonal allergies    • Tobacco dependence syndrome         Past Surgical History:   Procedure Laterality Date   • COLONOSCOPY     • DENTAL PROCEDURE     • ENDOSCOPY N/A 2019    Procedure: ESOPHAGOGASTRODUODENOSCOPY;  Surgeon: Karen Sheikh MD;  Location: [facility] ENDOSCOPY;  Service: Gastroenterology   • UPPER GASTROINTESTINAL ENDOSCOPY  2019        PT Ortho     Row Name 21 0900       Subjective Comments    Subjective Comments Pt notes very sore today. reports crystal the thinks he might have over done it with tband exercises as well as doing a lot of sanding work  yesterday.  -AC       Precautions and Contraindications    Precautions/Limitations no known precautions/limitations  -AC    Precautions per MD note- follow SAD/rudy, no true restrictions.  -AC       Subjective Pain    Able to rate subjective pain? yes  -AC          User Key  (r) = Recorded By, (t) = Taken By, (c) = Cosigned By    Initials Name Provider Type    Anna Amaya, PT Physical Therapist                             PT Assessment/Plan     Row Name 12/16/21 1000          PT Assessment    Assessment Comments decreased HEP to red tband due to pt frequently over-doing activity and increasing pain in L shoulder. edu to complete activities with RTB for 1 week or until no longer getting sore after exercises with red. Pt continues to show good mobility. treatment today decreased intensity with shoulder strength due to increased pain prior to session starting.  -AC            PT Plan    PT Frequency 2x/week  -AC     Predicted Duration of Therapy Intervention (PT) 6-8 weeks  -AC     PT Plan Comments continue with POC. progress as able.  -AC           User Key  (r) = Recorded By, (t) = Taken By, (c) = Cosigned By    Initials Name Provider Type    Anna Amaya, PT Physical Therapist                 Modalities     Row Name 12/16/21 0900             Ice    Ice Applied Yes  -AC      Location R shoulder with IFC  -AC      Ice S/P Rx Yes  -AC              ELECTRICAL STIMULATION    Attended/Unattended Unattended  -AC      Stimulation Type IFC  -AC      Location/Electrode Placement/Other R shoulder with ice  -AC      PT E-Stim Unattended Minutes 15  -AC            User Key  (r) = Recorded By, (t) = Taken By, (c) = Cosigned By    Initials Name Provider Type    Anna Amaya, PT Physical Therapist               OP Exercises     Row Name 12/16/21 1000 12/16/21 0900          Subjective Comments    Subjective Comments -- Pt notes very sore today. reports crystal the thinks he might have over done it with  tband exercises as well as doing a lot of sanding work yesterday.  -AC            Subjective Pain    Able to rate subjective pain? -- yes  -AC     Pre-Treatment Pain Level -- 9  -AC            Total Minutes    79337 - PT Therapeutic Exercise Minutes 35  -AC --            Exercise 1    Exercise Name 1 -- Pro II- L1  -AC     Time 1 -- 10 min  -AC            Exercise 2    Exercise Name 2 -- UT stretch  -AC     Sets 2 -- 3  -AC     Time 2 -- 30s  -AC            Exercise 3    Exercise Name 3 -- LS stretch  -AC     Sets 3 -- 3  -AC     Time 3 -- 30s  -AC            Exercise 4    Exercise Name 4 -- tball shoulder flexion  -AC     Sets 4 -- 1  -AC     Reps 4 -- 15  -AC     Time 4 -- 10s hold  -AC            Exercise 5    Exercise Name 5 -- doorway pec stretch  -AC     Sets 5 -- 3  -AC     Time 5 -- 30s  -AC            Exercise 6    Exercise Name 6 -- tband no money  -AC     Sets 6 -- 2  -AC     Reps 6 -- 10  -AC     Additional Comments -- red  -AC            Exercise 7    Exercise Name 7 -- tband tricep ext  -AC     Sets 7 -- 3  -AC     Reps 7 -- 10  -AC     Additional Comments -- red  -AC            Exercise 8    Exercise Name 8 -- bicep curls 3-way  -AC     Sets 8 -- 2  -AC     Reps 8 -- 10  -AC     Additional Comments -- 3# BD  -AC            Exercise 9    Exercise Name 9 -- see modalities  -AC     Time 9 -- 15 min  -AC           User Key  (r) = Recorded By, (t) = Taken By, (c) = Cosigned By    Initials Name Provider Type    AC Anna Beasley, PT Physical Therapist                              PT OP Goals     Row Name 12/16/21 1000          PT Short Term Goals    STG 1 Pt is indpt with HEP and compliant to protocol.  -     STG 1 Progress Ongoing  -     STG 2 Pt demo's PROM WNLs.  -     STG 2 Progress Met  -            Long Term Goals    LTG Date to Achieve 01/26/22  -     LTG 1 QuickDASH improved to </= 25.  -     LTG 1 Progress Ongoing  -     LTG 2 Pt demo's RUE MMT 4+/5 or better in all planes.  -      LTG 2 Progress Ongoing  -AC     LTG 3 Pt demos R Shoulder AROM WNLs for all planes.  -AC     LTG 3 Progress Met  -AC     LTG 4 Pt is carin to return to full work duties with minimal pain increase.  -AC     LTG 4 Progress Ongoing  -AC            Time Calculation    PT Goal Re-Cert Due Date 12/22/21  -           User Key  (r) = Recorded By, (t) = Taken By, (c) = Cosigned By    Initials Name Provider Type    AC Anna Beasley, PT Physical Therapist                               Time Calculation:   Start Time: 0935  Stop Time: 1025  Time Calculation (min): 50 min  Timed Charges  00184 - PT Therapeutic Exercise Minutes: 35  Untimed Charges  PT E-Stim Unattended Minutes: 15  Total Minutes  Timed Charges Total Minutes: 35  Untimed Charges Total Minutes: 15   Total Minutes: 50  Therapy Charges for Today     Code Description Service Date Service Provider Modifiers Qty    34935473984  PT THER PROC EA 15 MIN 12/16/2021 Anna Beasley, PT GP 2    18248610574 HC PT ELECTRICAL STIM UNATTENDED 12/16/2021 Anna Beasley, PT  1                    Anna Beasley, PT  12/16/2021

## 2021-12-21 ENCOUNTER — HOSPITAL ENCOUNTER (OUTPATIENT)
Dept: PHYSICAL THERAPY | Facility: HOSPITAL | Age: 46
Setting detail: THERAPIES SERIES
Discharge: HOME | End: 2021-12-21

## 2021-12-21 DIAGNOSIS — M75.41 IMPINGEMENT SYNDROME OF RIGHT SHOULDER: Primary | ICD-10-CM

## 2021-12-21 DIAGNOSIS — Z51.89 ENCOUNTER FOR OTHER SPECIFIED AFTERCARE: ICD-10-CM

## 2021-12-21 PROCEDURE — 97110 THERAPEUTIC EXERCISES: CPT

## 2021-12-21 PROCEDURE — G0283 ELEC STIM OTHER THAN WOUND: HCPCS

## 2021-12-21 NOTE — THERAPY TREATMENT NOTE
Outpatient Physical Therapy Ortho Treatment Note  [facility]     Patient Name: Marko Izaguirre  : 1975  MRN: [id number]  Today's Date: 2021      Visit Date: 2021     ATTENDANCE:  (12 approved)  SUBJECTIVE IMPROVEMENT: not assessed  NEXT MD APPOINTMENT: 2021  RECERT DATE: 2021     THERAPY DIAGNOSIS: R shoulder SAD/rudy 2021    Visit Dx:    ICD-10-CM ICD-9-CM   1. Impingement syndrome of right shoulder  M75.41 726.2   2. Encounter for other specified aftercare  Z51.89 V58.89       Patient Active Problem List   Diagnosis   • Essential hypertension   • Migraine without aura and without status migrainosus, not intractable   • Seasonal allergies   • Mild intermittent asthma without complication   • Chronic bilateral low back pain with sciatica   • LUQ pain   • Gastroesophageal reflux disease   • Gastritis   • Polyp of colon   • Abdominal pain, RLQ   • Allergic rhinitis   • COPD, mild (HCC)   • Headache(784.0)   • Migraine with aura and without status migrainosus, not intractable   • Substance abuse (HCC)   • Lumbago with sciatica   • Essential (primary) hypertension        Past Medical History:   Diagnosis Date   • Asthma    • Back pain    • Backache    • Benign hypertension    • GERD (gastroesophageal reflux disease)    • Headache    • Hypertension    • Migraine    • Osteoarthritis of multiple joints    • Paresthesia of both hands    • Seasonal allergies    • Tobacco dependence syndrome         Past Surgical History:   Procedure Laterality Date   • COLONOSCOPY     • DENTAL PROCEDURE     • ENDOSCOPY N/A 2019    Procedure: ESOPHAGOGASTRODUODENOSCOPY;  Surgeon: Karen Sheikh MD;  Location: [facility] ENDOSCOPY;  Service: Gastroenterology   • UPPER GASTROINTESTINAL ENDOSCOPY  2019        PT Ortho     Row Name 21 1000       Precautions and Contraindications    Precautions/Limitations no known precautions/limitations  -KH    Precautions per MD note-  follow JUAN/rudy, no true restrictions.  -       Posture/Observations    Posture/Observations Comments no sling; brusie noted R anterior shoulder (deltoid region);  -          User Key  (r) = Recorded By, (t) = Taken By, (c) = Cosigned By    Initials Name Provider Type    Jessa Jay PTA Physical Therapy Assistant                             PT Assessment/Plan     Row Name 12/21/21 1000          PT Assessment    Functional Limitations Limitation in home management; Limitations in community activities; Limitations in functional capacity and performance; Performance in leisure activities; Performance in self-care ADL; Performance in work activities  -     Impairments Pain; Muscle strength; Posture; Range of motion; Impaired flexibility  -     Assessment Comments decreased treatment this date secondary to pain still being elevated; did things within painfree range;  -     Rehab Potential Good  -            PT Plan    PT Frequency 2x/week  -     Predicted Duration of Therapy Intervention (PT) 6-8 weeks then TBD  -     PT Plan Comments Monitor pain and progress strength and ROM as able.  -           User Key  (r) = Recorded By, (t) = Taken By, (c) = Cosigned By    Initials Name Provider Type    Jessa Jay PTA Physical Therapy Assistant                 Modalities     Row Name 12/21/21 1000             Ice    Ice Applied Yes  -      Location R shoulder with [facility]      Ice S/P Rx Yes  -              ELECTRICAL STIMULATION    Attended/Unattended Unattended  -      Stimulation Type IFC  -KH      Location/Electrode Placement/Other R shoulder with ice  -      PT E-Stim Unattended Minutes 15  -            User Key  (r) = Recorded By, (t) = Taken By, (c) = Cosigned By    Initials Name Provider Type    Jessa Jay PTA Physical Therapy Assistant               OP Exercises     Row Name 12/21/21 1000             Subjective Comments    Subjective Comments Patient reports that he woke up  "this morning with a bruise on his R anterior shoulder; Continues to be able to raise his arm but it is slower than it was;  -              Subjective Pain    Able to rate subjective pain? yes  -      Pre-Treatment Pain Level 7  -KH      Post-Treatment Pain Level 4  -              Exercise 1    Exercise Name 1 pro ll UE strength  -KH      Time 1 10 mins  -KH      Additional Comments level 3; seat 9  -KH              Exercise 2    Exercise Name 2 wall wipes 3 way  -KH      Sets 2 1  -KH      Reps 2 15 each  -KH              Exercise 3    Exercise Name 3 doorway pec stretch  -KH      Sets 3 3  -KH      Time 3 30\" holds  -KH              Exercise 4    Exercise Name 4 supine AROM shoulder flexion to 90°  -KH      Sets 4 2  -KH      Reps 4 10  -KH      Additional Comments slow ecc control  -KH              Exercise 5    Exercise Name 5 SL shoulder abd  -KH      Sets 5 2  -KH      Reps 5 10  -KH      Additional Comments slow ecc control  -KH              Exercise 6    Exercise Name 6 SL ER  -KH      Sets 6 2  -KH      Reps 6 10  -KH      Additional Comments slow ecc control  -KH              Exercise 7    Exercise Name 7 supime serratis punches  -KH      Sets 7 2  -KH      Reps 7 10  -KH              Exercise 8    Exercise Name 8 supine serratus circles  -KH      Sets 8 2  -KH      Reps 8 10  -KH      Additional Comments cw/ccw  -            User Key  (r) = Recorded By, (t) = Taken By, (c) = Cosigned By    Initials Name Provider Type    Jessa Jay PTA Physical Therapy Assistant                              PT OP Goals     Row Name 12/21/21 1000          PT Short Term Goals    STG 1 Pt is indpt with HEP and compliant to protocol.  -     STG 1 Progress Ongoing  -     STG 2 Pt demo's PROM WNLs.  -     STG 2 Progress Met  -            Long Term Goals    LTG Date to Achieve 01/26/22  -     LTG 1 QuickDASH improved to </= 25.  -     LTG 1 Progress Ongoing  -     LTG 2 Pt demo's RUE MMT 4+/5 or better " in all planes.  -     LTG 2 Progress Ongoing  -     LTG 3 Pt demos R Shoulder AROM WNLs for all planes.  -     LTG 3 Progress Met  -     LTG 4 Pt is carin to return to full work duties with minimal pain increase.  -     LTG 4 Progress Ongoing  -            Time Calculation    PT Goal Re-Cert Due Date 12/22/21  -           User Key  (r) = Recorded By, (t) = Taken By, (c) = Cosigned By    Initials Name Provider Type    Jessa Jay PTA Physical Therapy Assistant                               Time Calculation:   Start Time: 1015  Stop Time: 1110  Time Calculation (min): 55 min  Untimed Charges  PT E-Stim Unattended Minutes: 15  Total Minutes  Untimed Charges Total Minutes: 15   Total Minutes: 15  Therapy Charges for Today     Code Description Service Date Service Provider Modifiers Qty    26154271656 HC PT THER SUPP EA 15 MIN 12/21/2021 Jessa Marin PTA GP 1    23587191709 HC PT ELECTRICAL STIM UNATTENDED 12/21/2021 Jessa Marin PTA  1    96935862990 HC PT THER PROC EA 15 MIN 12/21/2021 Jessa Marin PTA GP 3                    Jessa Marin PTA  12/21/2021

## 2021-12-23 ENCOUNTER — HOSPITAL ENCOUNTER (OUTPATIENT)
Dept: PHYSICAL THERAPY | Facility: HOSPITAL | Age: 46
Setting detail: THERAPIES SERIES
Discharge: HOME | End: 2021-12-23

## 2021-12-23 PROCEDURE — 97110 THERAPEUTIC EXERCISES: CPT

## 2021-12-23 PROCEDURE — G0283 ELEC STIM OTHER THAN WOUND: HCPCS

## 2021-12-23 NOTE — THERAPY TREATMENT NOTE
Outpatient Physical Therapy Ortho Treatment Note  [facility]     Patient Name: Marko Izaguirre  : 1975  MRN: [id number]  Today's Date: 2021      Visit Date: 2021     Subjective Improvement some  Visits 8/8  Visits approved 12  RTMD 2022  Recert Date 2021    S/P right Shoulder SAD/Tracy on 2021  Post op 4 weeks 2 days    Visit Dx:  No diagnosis found.    Patient Active Problem List   Diagnosis   • Essential hypertension   • Migraine without aura and without status migrainosus, not intractable   • Seasonal allergies   • Mild intermittent asthma without complication   • Chronic bilateral low back pain with sciatica   • LUQ pain   • Gastroesophageal reflux disease   • Gastritis   • Polyp of colon   • Abdominal pain, RLQ   • Allergic rhinitis   • COPD, mild (HCC)   • Headache(784.0)   • Migraine with aura and without status migrainosus, not intractable   • Substance abuse (HCC)   • Lumbago with sciatica   • Essential (primary) hypertension        Past Medical History:   Diagnosis Date   • Asthma    • Back pain    • Backache    • Benign hypertension    • GERD (gastroesophageal reflux disease)    • Headache    • Hypertension    • Migraine    • Osteoarthritis of multiple joints    • Paresthesia of both hands    • Seasonal allergies    • Tobacco dependence syndrome         Past Surgical History:   Procedure Laterality Date   • COLONOSCOPY     • DENTAL PROCEDURE     • ENDOSCOPY N/A 2019    Procedure: ESOPHAGOGASTRODUODENOSCOPY;  Surgeon: Karen Sheikh MD;  Location: [facility] ENDOSCOPY;  Service: Gastroenterology   • UPPER GASTROINTESTINAL ENDOSCOPY  2019        PT Ortho     Row Name 21 1300       Subjective Comments    Subjective Comments Patient reports decrease pain in right shoulder today.  -CP       Precautions and Contraindications    Precautions per MD note- follow David, no true restrictions.  -CP    Contraindications Post op 4 weeks 2  day  -CP       Subjective Pain    Able to rate subjective pain? yes  -CP    Pre-Treatment Pain Level 4  -CP       Posture/Observations    Posture/Observations Comments no sling; brusie noted R anterior shoulder (deltoid region);  -CP          User Key  (r) = Recorded By, (t) = Taken By, (c) = Cosigned By    Initials Name Provider Type    Alyssa Perez PTA Physical Therapy Assistant                             PT Assessment/Plan     Row Name 12/23/21 1356          PT Assessment    Assessment Comments Patient tolerated ther without reports of increase pain.  He is progressing well toward all goals.  He does have some pain at end range of PROM FL and AB  -CP            PT Plan    PT Frequency 2x/week  -CP     Predicted Duration of Therapy Intervention (PT) 6-8 weeks  -CP     PT Plan Comments Cont with POC.  SL'ing AD with alondra lomas if able  -CP           User Key  (r) = Recorded By, (t) = Taken By, (c) = Cosigned By    Initials Name Provider Type    Alyssa Perez PTA Physical Therapy Assistant                 Modalities     Row Name 12/23/21 1300             Ice    Ice Applied Yes  -CP      Location R shoulder with IFC  -CP      PT Ice Rx Minutes 15  -CP      Ice S/P Rx Yes  -CP              ELECTRICAL STIMULATION    Attended/Unattended Unattended  -CP      Stimulation Type IFC  -CP      Location/Electrode Placement/Other R shoulder with ice  -CP      PT E-Stim Unattended Minutes 15  -CP            User Key  (r) = Recorded By, (t) = Taken By, (c) = Cosigned By    Initials Name Provider Type    Alyssa Perez PTA Physical Therapy Assistant               OP Exercises     Row Name 12/23/21 1358 12/23/21 1300          Subjective Comments    Subjective Comments -- Patient reports decrease pain in right shoulder today.  -CP            Subjective Pain    Able to rate subjective pain? -- yes  -CP     Pre-Treatment Pain Level -- 4  -CP     Post-Treatment Pain Level -- 1  -CP            Total  "Minutes    54582 - PT Therapeutic Exercise Minutes 40  -CP --            Exercise 1    Exercise Name 1 -- Pro II level 3  -CP     Time 1 -- 10  -CP     Additional Comments -- FW/BW  -CP            Exercise 2    Exercise Name 2 -- doorway stretch  -CP     Cueing 2 -- Verbal; Demo  -CP     Sets 2 -- 3  -CP     Time 2 -- 30  -CP     Additional Comments -- hands low  -CP            Exercise 3    Exercise Name 3 -- wall wipes \"W\"  -CP     Cueing 3 -- Verbal; Demo  -CP     Sets 3 -- 1  -CP     Reps 3 -- 10  -CP            Exercise 4    Exercise Name 4 -- standing shulder fl to 90  -CP     Cueing 4 -- Verbal; Demo  -CP     Sets 4 -- 2  -CP     Reps 4 -- 10  -CP            Exercise 5    Exercise Name 5 -- standing scap to 90  -CP     Cueing 5 -- Verbal; Demo  -CP     Sets 5 -- 2  -CP     Reps 5 -- 10  -CP            Exercise 6    Exercise Name 6 -- scap rows with tband  -CP     Cueing 6 -- Verbal; Demo  -CP     Sets 6 -- 2  -CP     Reps 6 -- 10  -CP     Time 6 -- green  -CP            Exercise 7    Exercise Name 7 -- shoulder ext with tband  -CP     Cueing 7 -- Verbal; Demo  -CP     Sets 7 -- 2  -CP     Reps 7 -- 10  -CP     Time 7 -- green  -CP            Exercise 8    Exercise Name 8 -- SL'ing ER  -CP     Cueing 8 -- Verbal; Demo  -CP     Sets 8 -- 2  -CP     Reps 8 -- 10  -CP            Exercise 9    Exercise Name 9 -- supine chest press  -CP     Cueing 9 -- Verbal; Demo  -CP     Sets 9 -- 2  -CP     Reps 9 -- 10  -CP     Time 9 -- 2 lb weight cane  -CP            Exercise 10    Exercise Name 10 -- PROM  -CP     Time 10 -- 5  -CP           User Key  (r) = Recorded By, (t) = Taken By, (c) = Cosigned By    Initials Name Provider Type    CP Alyssa Meyer, PTA Physical Therapy Assistant                              PT OP Goals     Row Name 12/23/21 1300          PT Short Term Goals    STG 1 Pt is indpt with HEP and compliant to protocol.  -CP     STG 1 Progress Ongoing  -CP     STG 2 Pt demo's PROM WNLs.  -CP     STG 2 " Progress Met  -CP            Long Term Goals    LTG Date to Achieve 01/26/22  -CP     LTG 1 QuickDASH improved to </= 25.  -CP     LTG 1 Progress Ongoing  -CP     LTG 2 Pt demo's RUE MMT 4+/5 or better in all planes.  -CP     LTG 2 Progress Ongoing  -CP     LTG 3 Pt demos R Shoulder AROM WNLs for all planes.  -CP     LTG 3 Progress Met  -CP     LTG 4 Pt is carin to return to full work duties with minimal pain increase.  -CP     LTG 4 Progress Ongoing  -CP            Time Calculation    PT Goal Re-Cert Due Date 12/22/21  -CP           User Key  (r) = Recorded By, (t) = Taken By, (c) = Cosigned By    Initials Name Provider Type    CP Alyssa Meyer PTA Physical Therapy Assistant                               Time Calculation:   Start Time: 1301  Stop Time: 1403  Time Calculation (min): 62 min  Total Timed Code Minutes- PT: 40 minute(s)  Timed Charges  05661 - PT Therapeutic Exercise Minutes: 40  Untimed Charges  PT E-Stim Unattended Minutes: 15  PT Ice Rx Minutes: 15  Total Minutes  Timed Charges Total Minutes: 40  Untimed Charges Total Minutes: 30   Total Minutes: 40  Therapy Charges for Today     Code Description Service Date Service Provider Modifiers Qty    96060899216 HC PT ELECTRICAL STIM UNATTENDED 12/23/2021 Alyssa Meyer PTA  1    35261718948 HC PT THER PROC EA 15 MIN 12/23/2021 Alyssa Meyer PTA GP 3                    Alyssa Meyer PTA  12/23/2021

## 2021-12-28 ENCOUNTER — HOSPITAL ENCOUNTER (OUTPATIENT)
Dept: PHYSICAL THERAPY | Facility: HOSPITAL | Age: 46
Setting detail: THERAPIES SERIES
Discharge: HOME | End: 2021-12-28

## 2021-12-28 DIAGNOSIS — M75.41 IMPINGEMENT SYNDROME OF RIGHT SHOULDER: Primary | ICD-10-CM

## 2021-12-28 PROCEDURE — G0283 ELEC STIM OTHER THAN WOUND: HCPCS

## 2021-12-28 PROCEDURE — 97110 THERAPEUTIC EXERCISES: CPT

## 2021-12-28 NOTE — THERAPY PROGRESS REPORT/RE-CERT
Outpatient Physical Therapy Ortho Progress Note  [facility]     Patient Name: Marko Izaguirre  : 1975  MRN: [id number]  Today's Date: 2021      Visit Date: 2021     ATTENDANCE:   SUBJECTIVE IMPROVEMENT: 75-80%  NEXT MD APPOINTMENT: 2022  RECERT DATE: 2022    THERAPY DIAGNOSIS: R shoulder SAD/Tracy procedure on 2021      Visit Dx:    ICD-10-CM ICD-9-CM   1. Impingement syndrome of right shoulder  M75.41 726.2       Patient Active Problem List   Diagnosis   • Essential hypertension   • Migraine without aura and without status migrainosus, not intractable   • Seasonal allergies   • Mild intermittent asthma without complication   • Chronic bilateral low back pain with sciatica   • LUQ pain   • Gastroesophageal reflux disease   • Gastritis   • Polyp of colon   • Abdominal pain, RLQ   • Allergic rhinitis   • COPD, mild (HCC)   • Headache(784.0)   • Migraine with aura and without status migrainosus, not intractable   • Substance abuse (HCC)   • Lumbago with sciatica   • Essential (primary) hypertension        Past Medical History:   Diagnosis Date   • Asthma    • Back pain    • Backache    • Benign hypertension    • GERD (gastroesophageal reflux disease)    • Headache    • Hypertension    • Migraine    • Osteoarthritis of multiple joints    • Paresthesia of both hands    • Seasonal allergies    • Tobacco dependence syndrome         Past Surgical History:   Procedure Laterality Date   • COLONOSCOPY     • DENTAL PROCEDURE     • ENDOSCOPY N/A 2019    Procedure: ESOPHAGOGASTRODUODENOSCOPY;  Surgeon: Karen Sheikh MD;  Location: [facility] ENDOSCOPY;  Service: Gastroenterology   • UPPER GASTROINTESTINAL ENDOSCOPY  2019        PT Ortho     Row Name 21 0900       Subjective Comments    Subjective Comments Pt notes that he is hurting today due to needing to change a tire. pain increased with pushing himself upf rom thr ground and getting lug nuts off.  Subjective improvment 75-80%. Notes shoulder sill feels weak with lifting, pushing/pulling. Return to MD next Thursday  -AC       Precautions and Contraindications    Precautions/Limitations no known precautions/limitations  -AC    Precautions per MD note- follow JUAN/rudy, no true restrictions.  -AC       Subjective Pain    Able to rate subjective pain? yes  -AC    Pre-Treatment Pain Level 7  -AC    Post-Treatment Pain Level 2  -AC       Posture/Observations    Posture/Observations Comments no sling. forward head and rounded shoulders posture. R shoulder gaurding.  -AC       Right Upper Ext    Rt Upper Extremity Comments  WNLs- pain with flexion/abduction  -AC       MMT Right Upper Ext    Rt Shoulder Flexion MMT, Gross Movement (4+/5) good plus  -AC    Rt Shoulder Extension MMT, Gross Movement (4+/5) good plus  -AC    Rt Shoulder ABduction MMT, Gross Movement (4/5) good  -AC    Rt Shoulder ADduction MMT, Gross Movement (4+/5) good plus  -AC    Rt Shoulder Internal Rotation MMT, Gross Movement (4/5) good  -AC    Rt Shoulder External Rotation MMT, Gross Movement (4/5) good  -AC    Rt Upper Extremity Comments  pain with abduction.  -AC       MMT Left Upper Ext    Lt Upper Extremity Comments  grossly 5/5 no pain  -AC          User Key  (r) = Recorded By, (t) = Taken By, (c) = Cosigned By    Initials Name Provider Type    AC Anna Beasley, PT Physical Therapist                             PT Assessment/Plan     Row Name 12/28/21 0900          PT Assessment    Functional Limitations Limitation in home management; Limitations in community activities; Limitations in functional capacity and performance; Performance in leisure activities; Performance in self-care ADL; Performance in work activities  -AC     Impairments Impaired flexibility; Muscle strength; Pain; Posture  -AC     Assessment Comments Re-evaluation completed today with all ROM goals met. Strength, subjective improvment, and return to work goals eamin  umet a thtis time. Pt emos improved RUE strength however increased pain with all testing, most signficiant with abduction. pt continues to have increased pain with midrange flexion/abduction AROM. He continue sot demo poor rounded shoulders posture with occasional RUE gaurding. He has palpatin tenderess along AC joint, biceps and midly along delts. R biceps trigger point noted in distal/middle biceps. He remains approrpaite for skilled Pt to continue to improve monbility and strength for return to PLOF And return to work.  -AC     Rehab Potential Good  -AC     Patient/caregiver participated in establishment of treatment plan and goals Yes  -AC     Patient would benefit from skilled therapy intervention Yes  -AC            PT Plan    PT Frequency 2x/week  -AC     Predicted Duration of Therapy Intervention (PT) 6-8 weeks  -AC     PT Plan Comments continue with UE stretching/ROM and strength as tolerated. continue manual to R biceps/shoulder musuclatrue as needed. trial sled pull/push for work duties. continue with modalities for pain  -AC           User Key  (r) = Recorded By, (t) = Taken By, (c) = Cosigned By    Initials Name Provider Type    Anna Amaya, PT Physical Therapist                 Modalities     Row Name 12/28/21 0900             Ice    Ice Applied Yes  -AC      Location R shoulder with IFC  -AC      Ice S/P Rx Yes  -AC              ELECTRICAL STIMULATION    Attended/Unattended Unattended  -AC      Stimulation Type IFC  -AC      Location/Electrode Placement/Other R soulder with Ice  -AC      PT E-Stim Unattended Minutes 15  -AC            User Key  (r) = Recorded By, (t) = Taken By, (c) = Cosigned By    Initials Name Provider Type    Anna Amaya, PT Physical Therapist               OP Exercises     Row Name 12/28/21 0900             Subjective Comments    Subjective Comments Pt notes that he is hurting today due to needing to change a tire. pain increased with pushing himself upf rom  thr ground and getting lug nuts off. Subjective improvment 75-80%. Notes shoulder sill feels weak with lifting, pushing/pulling. Return to MD next Thursday  -AC              Subjective Pain    Able to rate subjective pain? yes  -AC      Pre-Treatment Pain Level 7  -AC      Post-Treatment Pain Level 2  -AC              Total Minutes    69750 - PT Therapeutic Exercise Minutes 40  -AC              Exercise 1    Exercise Name 1 Pro II- L3.5  -AC      Time 1 10 min  -AC              Exercise 2    Exercise Name 2 doorway stretch- arms low  -AC      Sets 2 3  -AC      Time 2 30s  -AC              Exercise 3    Exercise Name 3 QuickDASH  -AC      Additional Comments see outcomes  -AC              Exercise 4    Exercise Name 4 MMT/ROM  -AC      Additional Comments see ortho  -AC              Exercise 5    Exercise Name 5 W-wall wipes  -AC      Sets 5 1  -AC      Reps 5 20  -AC              Exercise 6    Exercise Name 6 prone I/Y/T/Row  -AC      Sets 6 1  -AC      Reps 6 20  -AC      Additional Comments 2# DB- fows/Is  -AC              Exercise 7    Exercise Name 7 shoulder flexion to 90  -AC      Sets 7 1  -AC      Reps 7 20  -AC      Additional Comments focus on posture with activity  -AC              Exercise 8    Exercise Name 8 shoulder abduction to 90  -AC      Sets 8 1  -AC      Reps 8 20  -AC      Additional Comments focus on posture with activity  -AC              Exercise 9    Exercise Name 9 manual trigger point release to the R delt/biceps  -AC      Time 9 5 min  -AC              Exercise 10    Exercise Name 10 see modalities  -AC      Time 10 15 min  -AC            User Key  (r) = Recorded By, (t) = Taken By, (c) = Cosigned By    Initials Name Provider Type    AC Anna Beasley, PT Physical Therapist                              PT OP Goals     Row Name 12/28/21 0900          PT Short Term Goals    STG 1 Pt is indpt with HEP and compliant to protocol.  -AC     STG 1 Progress Ongoing  -AC     STG 2 Pt ann's  PROM WNLs.  -AC     STG 2 Progress Met  -AC            Long Term Goals    LTG Date to Achieve 01/26/22  -AC     LTG 1 QuickDASH improved to </= 25.  -AC     LTG 1 Progress Not Met  -AC     LTG 1 Progress Comments 45 today  -AC     LTG 2 Pt demo's RUE MMT 4+/5 or better in all planes.  -AC     LTG 2 Progress Not Met  -AC     LTG 3 Pt demos R Shoulder AROM WNLs for all planes.  -AC     LTG 3 Progress Met  -AC     LTG 4 Pt is able to return to full work duties with minimal pain increase.  -AC     LTG 4 Progress Not Met  -AC            Time Calculation    PT Goal Re-Cert Due Date 01/18/22  -           User Key  (r) = Recorded By, (t) = Taken By, (c) = Cosigned By    Initials Name Provider Type    Anna Amaya, FLAKITA Physical Therapist                     Outcome Measure Options: Quick DASH  Quick DASH  Open a tight or new jar.: Mild Difficulty  Do heavy household chores (e.g., wash walls, wash floors): Moderate Difficulty  Carry a shopping bag or briefcase: Moderate Difficulty  Wash your back: Severe Difficulty  Use a knife to cut food: Mild Difficulty  Recreational activities in which you take some force or impact through your arm, should or hand (e.g. golf, hammering, tennis, etc.): Moderate Difficulty  During the past week, to what extent has your arm, shoulder, or hand problem interfered with your normal social activites with family, friends, neighbors or groups?: Slightly  During the past week, were you limited in your work or other regular daily activities as a result of your arm, shoulder or hand problem?: Moderately Limited  Arm, Shoulder, or hand pain: Moderate  Tingling (pins and needles) in your arm, shoulder, or hand: Mild  During the past week, how much difficulty have you had sleeping because of the pain in your arm, shoulder or hand?: Severe Difficulty  Number of Questions Answered: 11  Quick DASH Score: 45.45         Time Calculation:   Start Time: 0930  Stop Time: 1025  Time Calculation (min):  55 min  Timed Charges  58254 - PT Therapeutic Exercise Minutes: 40  Untimed Charges  PT E-Stim Unattended Minutes: 15  Total Minutes  Timed Charges Total Minutes: 40  Untimed Charges Total Minutes: 15   Total Minutes: 55  Therapy Charges for Today     Code Description Service Date Service Provider Modifiers Qty    44657346375 HC PT THER PROC EA 15 MIN 12/28/2021 Anna Beasley, PT GP 3    65564787669 HC PT ELECTRICAL STIM UNATTENDED 12/28/2021 Anna Beasley, PT  1                   Anna Beasley, PT  12/28/2021

## 2021-12-30 ENCOUNTER — APPOINTMENT (OUTPATIENT)
Dept: PHYSICAL THERAPY | Facility: HOSPITAL | Age: 46
End: 2021-12-30

## 2022-01-04 ENCOUNTER — HOSPITAL ENCOUNTER (OUTPATIENT)
Dept: PHYSICAL THERAPY | Facility: HOSPITAL | Age: 47
Setting detail: THERAPIES SERIES
Discharge: HOME | End: 2022-01-04

## 2022-01-04 DIAGNOSIS — M75.41 IMPINGEMENT SYNDROME OF RIGHT SHOULDER: Primary | ICD-10-CM

## 2022-01-04 PROCEDURE — G0283 ELEC STIM OTHER THAN WOUND: HCPCS

## 2022-01-04 PROCEDURE — 97110 THERAPEUTIC EXERCISES: CPT

## 2022-01-04 PROCEDURE — 97530 THERAPEUTIC ACTIVITIES: CPT

## 2022-01-04 NOTE — THERAPY TREATMENT NOTE
Outpatient Physical Therapy Ortho Treatment Note  [facility]     Patient Name: Marko Izaguirre  : 1975  MRN: [id number]  Today's Date: 2022      Visit Date: 2022     ATTENDANCE:10/10 (12 approved)  SUBJECTIVE IMPROVEMENT: 75-80%  NEXT MD APPOINTMENT: 2021  RECERT DATE: 2022     THERAPY DIAGNOSIS: R shoulder JUAN/rudy 2021    Visit Dx:    ICD-10-CM ICD-9-CM   1. Impingement syndrome of right shoulder  M75.41 726.2       Patient Active Problem List   Diagnosis   • Essential hypertension   • Migraine without aura and without status migrainosus, not intractable   • Seasonal allergies   • Mild intermittent asthma without complication   • Chronic bilateral low back pain with sciatica   • LUQ pain   • Gastroesophageal reflux disease   • Gastritis   • Polyp of colon   • Abdominal pain, RLQ   • Allergic rhinitis   • COPD, mild (HCC)   • Headache(784.0)   • Migraine with aura and without status migrainosus, not intractable   • Substance abuse ([facility])   • Lumbago with sciatica   • Essential (primary) hypertension        Past Medical History:   Diagnosis Date   • Asthma    • Back pain    • Backache    • Benign hypertension    • GERD (gastroesophageal reflux disease)    • Headache    • Hypertension    • Migraine    • Osteoarthritis of multiple joints    • Paresthesia of both hands    • Seasonal allergies    • Tobacco dependence syndrome         Past Surgical History:   Procedure Laterality Date   • COLONOSCOPY     • DENTAL PROCEDURE     • ENDOSCOPY N/A 2019    Procedure: ESOPHAGOGASTRODUODENOSCOPY;  Surgeon: Karen Sheikh MD;  Location: [facility] ENDOSCOPY;  Service: Gastroenterology   • UPPER GASTROINTESTINAL ENDOSCOPY  2019        PT Ortho     Row Name 22 0900       Precautions and Contraindications    Precautions/Limitations no known precautions/limitations  -KH    Precautions per MD note- follow David, no true restrictions.  -SHERI        Posture/Observations    Posture/Observations Comments no sling. forward head and rounded shoulders posture.  -KH       MMT Right Upper Ext    Rt Shoulder Flexion MMT, Gross Movement (4+/5) good plus  -KH    Rt Shoulder Extension MMT, Gross Movement (4+/5) good plus  -KH    Rt Shoulder ABduction MMT, Gross Movement (4/5) good  -KH    Rt Shoulder ADduction MMT, Gross Movement (4+/5) good plus  -KH    Rt Shoulder Internal Rotation MMT, Gross Movement (4/5) good  -KH    Rt Shoulder External Rotation MMT, Gross Movement (4/5) good  -KH          User Key  (r) = Recorded By, (t) = Taken By, (c) = Cosigned By    Initials Name Provider Type    Jessa Jay PTA Physical Therapy Assistant                             PT Assessment/Plan     Row Name 01/04/22 0900          PT Assessment    Functional Limitations Limitation in home management; Limitations in community activities; Limitations in functional capacity and performance; Performance in leisure activities; Performance in self-care ADL; Performance in work activities  -     Impairments Impaired flexibility; Muscle strength; Pain; Posture  -     Assessment Comments strength continues to improve but did have muscle faitgue and tightness noted in the R deltoids and down bicep with PREs; relieved with stretching; No change in HEP at this time; pt continues to express throughout treatment all extra activties he is doing outside of PT; Leaving PT today to go put a radiator in which may be what is keeping shoulder aggrevated; encouraged pt to continue with icing post activity;  -     Rehab Potential Good  -     Patient/caregiver participated in establishment of treatment plan and goals Yes  -     Patient would benefit from skilled therapy intervention Yes  -            PT Plan    PT Frequency 2x/week  -     Predicted Duration of Therapy Intervention (PT) 6-8 weeks  -     PT Plan Comments 2 more approved visits after today; Add UE stretches-bicep,tricep and  posterior capsule stretch to HEP next visit; continue to increase as advised; Manual next if needed;  -           User Key  (r) = Recorded By, (t) = Taken By, (c) = Cosigned By    Initials Name Provider Type    Jessa Jay PTA Physical Therapy Assistant                 Modalities     Row Name 01/04/22 0900             Ice    Ice Applied Yes  -      Location R shoulder with [facility]  -      Ice S/P Rx Yes  -              ELECTRICAL STIMULATION    Attended/Unattended Unattended  -      [facility]      Location/Electrode Placement/Other R soulder with Ice  -KH      PT E-Stim Unattended Minutes 15  -KH              Functional Testing    Outcome Measure Options Quick DASH  -            User Key  (r) = Recorded By, (t) = Taken By, (c) = Cosigned By    Initials Name Provider Type    Jessa Jay PTA Physical Therapy Assistant               OP Exercises     Row Name 01/04/22 0900             Subjective Comments    Subjective Comments patient reports that he returns to MD on 01/06/2022 hoping to get to return to work at least light duty; reports that his shoulder is getting better but having some popping still when he tries to push himself up or pulling on seat belt;  -              Subjective Pain    Able to rate subjective pain? yes  -      Pre-Treatment Pain Level 4  -      Post-Treatment Pain Level 2  -              Exercise 1    Exercise Name 1 pro ll UE strength  -      Time 1 10 mins  -      Additional Comments level 3.5; seat 8  -              Exercise 2    Exercise Name 2 wall wipes 3 way  -      Reps 2 15 each  -      Additional Comments patient reports that his shoulder is getting better; Returns to MD on 01/06/2022; Hoping to return to work; Still having some popping in the joint especially noted when trying to push himself up and when reaching and pulling on seatbelt this morning;  -              Exercise 3    Exercise Name 3 doorway stretch  -      Sets 3 3   "-      Time 3 30\" holds  -              Exercise 4    Exercise Name 4 IR/ER shoulder tband step aways w/ band  -      Sets 4 1  -KH      Reps 4 15 each  -KH      Additional Comments green (for scap/shoulder stab)  -              Exercise 5    Exercise Name 5 wall push ups  -KH      Sets 5 2  -KH      Reps 5 10  -KH              Exercise 6    Exercise Name 6 standing shoulder PREs 3 way  -KH      Sets 6 2  -KH      Reps 6 10 each  -KH      Additional Comments 2# db (flex,scaption, abduction)  -              Exercise 7    Exercise Name 7 bicep stretch at doorway  -KH      Sets 7 3  -KH      Time 7 30\" holds  -KH              Exercise 8    Exercise Name 8 standing tricep stretch  -KH      Sets 8 3  -KH      Time 8 30\" holds  -KH              Exercise 9    Exercise Name 9 posterior capsule stretch  -KH      Sets 9 3  -KH      Time 9 30\" holds  -KH            User Key  (r) = Recorded By, (t) = Taken By, (c) = Cosigned By    Initials Name Provider Type    Jessa Jay PTA Physical Therapy Assistant                              PT OP Goals     Row Name 01/04/22 0900          PT Short Term Goals    STG 1 Pt is indpt with HEP and compliant to protocol.  -     STG 1 Progress Ongoing  -     STG 2 Pt demo's PROM WNLs.  -     STG 2 Progress Met  -            Long Term Goals    LTG Date to Achieve 01/26/22  -     LTG 1 QuickDASH improved to </= 25.  -     LTG 1 Progress Not Met  -     LTG 2 Pt demo's RUE MMT 4+/5 or better in all planes.  -     LTG 2 Progress Not Met  -     LTG 3 Pt demos R Shoulder AROM WNLs for all planes.  -     LTG 3 Progress Met  -     LTG 4 Pt is able to return to full work duties with minimal pain increase.  -     LTG 4 Progress Not Met  -            Time Calculation    PT Goal Re-Cert Due Date 01/18/22  -           User Key  (r) = Recorded By, (t) = Taken By, (c) = Cosigned By    Initials Name Provider Type    Jessa Jay PTA Physical Therapy Assistant          "            Outcome Measure Options: Quick DASH         Time Calculation:   Start Time: 0927  Stop Time: 1027  Time Calculation (min): 60 min  Untimed Charges  PT E-Stim Unattended Minutes: 15  Total Minutes  Untimed Charges Total Minutes: 15   Total Minutes: 15  Therapy Charges for Today     Code Description Service Date Service Provider Modifiers Qty    38347687509 HC PT THER SUPP EA 15 MIN 1/4/2022 Jessa Marin, PTA GP 1    25317369490 HC PT ELECTRICAL STIM UNATTENDED 1/4/2022 Jessa Marin, PTA  1    02722027338 HC PT THER PROC EA 15 MIN 1/4/2022 Jessa Marin, PTA GP 2    64206780593 HC PT THERAPEUTIC ACT EA 15 MIN 1/4/2022 Jessa Marin, PTA GP 1          PT G-Codes  Outcome Measure Options: Isaac Marin PTA  1/4/2022

## 2022-01-06 ENCOUNTER — APPOINTMENT (OUTPATIENT)
Dept: PHYSICAL THERAPY | Facility: HOSPITAL | Age: 47
End: 2022-01-06

## 2022-01-14 ENCOUNTER — APPOINTMENT (OUTPATIENT)
Dept: PHYSICAL THERAPY | Facility: HOSPITAL | Age: 47
End: 2022-01-14

## 2022-09-15 ENCOUNTER — HOSPITAL ENCOUNTER (EMERGENCY)
Facility: HOSPITAL | Age: 47
Discharge: HOME | End: 2022-09-15
Attending: EMERGENCY MEDICINE | Admitting: EMERGENCY MEDICINE

## 2022-09-15 ENCOUNTER — APPOINTMENT (OUTPATIENT)
Dept: CT IMAGING | Facility: HOSPITAL | Age: 47
End: 2022-09-15

## 2022-09-15 VITALS
HEART RATE: 56 BPM | SYSTOLIC BLOOD PRESSURE: 136 MMHG | OXYGEN SATURATION: 98 % | DIASTOLIC BLOOD PRESSURE: 87 MMHG | BODY MASS INDEX: 30.06 KG/M2 | WEIGHT: 210 LBS | RESPIRATION RATE: 18 BRPM | TEMPERATURE: 98.5 F | HEIGHT: 70 IN

## 2022-09-15 DIAGNOSIS — K52.9 ILEITIS: Primary | ICD-10-CM

## 2022-09-15 LAB
ALBUMIN SERPL-MCNC: 4.1 G/DL (ref 3.5–5.2)
ALBUMIN/GLOB SERPL: 1.5 G/DL
ALP SERPL-CCNC: 85 U/L (ref 39–117)
ALT SERPL W P-5'-P-CCNC: 16 U/L (ref 1–41)
ANION GAP SERPL CALCULATED.3IONS-SCNC: 5 MMOL/L (ref 5–15)
AST SERPL-CCNC: 16 U/L (ref 1–40)
BASOPHILS # BLD AUTO: 0.12 10*3/MM3 (ref 0–0.2)
BASOPHILS NFR BLD AUTO: 1.1 % (ref 0–1.5)
BILIRUB SERPL-MCNC: 0.3 MG/DL (ref 0–1.2)
BILIRUB UR QL STRIP: NEGATIVE
BUN SERPL-MCNC: 9 MG/DL (ref 6–20)
BUN/CREAT SERPL: 8.4 (ref 7–25)
CALCIUM SPEC-SCNC: 9.3 MG/DL (ref 8.6–10.5)
CHLORIDE SERPL-SCNC: 102 MMOL/L (ref 98–107)
CLARITY UR: CLEAR
CO2 SERPL-SCNC: 33 MMOL/L (ref 22–29)
COLOR UR: YELLOW
CREAT SERPL-MCNC: 1.07 MG/DL (ref 0.76–1.27)
DEPRECATED RDW RBC AUTO: 40.1 FL (ref 37–54)
EGFRCR SERPLBLD CKD-EPI 2021: 86.1 ML/MIN/1.73
EOSINOPHIL # BLD AUTO: 0.93 10*3/MM3 (ref 0–0.4)
EOSINOPHIL NFR BLD AUTO: 8.2 % (ref 0.3–6.2)
ERYTHROCYTE [DISTWIDTH] IN BLOOD BY AUTOMATED COUNT: 12.7 % (ref 12.3–15.4)
GLOBULIN UR ELPH-MCNC: 2.8 GM/DL
GLUCOSE SERPL-MCNC: 76 MG/DL (ref 65–99)
GLUCOSE UR STRIP-MCNC: NEGATIVE MG/DL
HCT VFR BLD AUTO: 44.9 % (ref 37.5–51)
HGB BLD-MCNC: 16.2 G/DL (ref 13–17.7)
HGB UR QL STRIP.AUTO: NEGATIVE
HOLD SPECIMEN: NORMAL
HOLD SPECIMEN: NORMAL
IMM GRANULOCYTES # BLD AUTO: 0.04 10*3/MM3 (ref 0–0.05)
IMM GRANULOCYTES NFR BLD AUTO: 0.4 % (ref 0–0.5)
KETONES UR QL STRIP: NEGATIVE
LEUKOCYTE ESTERASE UR QL STRIP.AUTO: NEGATIVE
LIPASE SERPL-CCNC: 23 U/L (ref 13–60)
LYMPHOCYTES # BLD AUTO: 3.36 10*3/MM3 (ref 0.7–3.1)
LYMPHOCYTES NFR BLD AUTO: 29.6 % (ref 19.6–45.3)
MCH RBC QN AUTO: 31.4 PG (ref 26.6–33)
MCHC RBC AUTO-ENTMCNC: 36.1 G/DL (ref 31.5–35.7)
MCV RBC AUTO: 87 FL (ref 79–97)
MONOCYTES # BLD AUTO: 0.76 10*3/MM3 (ref 0.1–0.9)
MONOCYTES NFR BLD AUTO: 6.7 % (ref 5–12)
NEUTROPHILS NFR BLD AUTO: 54 % (ref 42.7–76)
NEUTROPHILS NFR BLD AUTO: 6.15 10*3/MM3 (ref 1.7–7)
NITRITE UR QL STRIP: NEGATIVE
NRBC BLD AUTO-RTO: 0 /100 WBC (ref 0–0.2)
PH UR STRIP.AUTO: 7.5 [PH] (ref 5–9)
PLATELET # BLD AUTO: 309 10*3/MM3 (ref 140–450)
PMV BLD AUTO: 10.5 FL (ref 6–12)
POTASSIUM SERPL-SCNC: 3.5 MMOL/L (ref 3.5–5.2)
PROT SERPL-MCNC: 6.9 G/DL (ref 6–8.5)
PROT UR QL STRIP: NEGATIVE
RBC # BLD AUTO: 5.16 10*6/MM3 (ref 4.14–5.8)
SODIUM SERPL-SCNC: 140 MMOL/L (ref 136–145)
SP GR UR STRIP: 1.01 (ref 1–1.03)
UROBILINOGEN UR QL STRIP: NORMAL
WBC NRBC COR # BLD: 11.36 10*3/MM3 (ref 3.4–10.8)
WHOLE BLOOD HOLD COAG: NORMAL
WHOLE BLOOD HOLD SPECIMEN: NORMAL

## 2022-09-15 PROCEDURE — 96375 TX/PRO/DX INJ NEW DRUG ADDON: CPT

## 2022-09-15 PROCEDURE — 74177 CT ABD & PELVIS W/CONTRAST: CPT

## 2022-09-15 PROCEDURE — 25010000002 IOPAMIDOL 61 % SOLUTION: Performed by: EMERGENCY MEDICINE

## 2022-09-15 PROCEDURE — 81003 URINALYSIS AUTO W/O SCOPE: CPT | Performed by: EMERGENCY MEDICINE

## 2022-09-15 PROCEDURE — 96374 THER/PROPH/DIAG INJ IV PUSH: CPT

## 2022-09-15 PROCEDURE — 80053 COMPREHEN METABOLIC PANEL: CPT | Performed by: EMERGENCY MEDICINE

## 2022-09-15 PROCEDURE — 25010000002 KETOROLAC TROMETHAMINE PER 15 MG: Performed by: NURSE PRACTITIONER

## 2022-09-15 PROCEDURE — 99283 EMERGENCY DEPT VISIT LOW MDM: CPT

## 2022-09-15 PROCEDURE — 85025 COMPLETE CBC W/AUTO DIFF WBC: CPT | Performed by: EMERGENCY MEDICINE

## 2022-09-15 PROCEDURE — 25010000002 ONDANSETRON PER 1 MG: Performed by: NURSE PRACTITIONER

## 2022-09-15 PROCEDURE — 99284 EMERGENCY DEPT VISIT MOD MDM: CPT

## 2022-09-15 PROCEDURE — 25010000002 HYDROMORPHONE 1 MG/ML SOLUTION: Performed by: NURSE PRACTITIONER

## 2022-09-15 PROCEDURE — 83690 ASSAY OF LIPASE: CPT | Performed by: EMERGENCY MEDICINE

## 2022-09-15 RX ORDER — SODIUM CHLORIDE 0.9 % (FLUSH) 0.9 %
10 SYRINGE (ML) INJECTION AS NEEDED
Status: DISCONTINUED | OUTPATIENT
Start: 2022-09-15 | End: 2022-09-15 | Stop reason: HOSPADM

## 2022-09-15 RX ORDER — ONDANSETRON 2 MG/ML
4 INJECTION INTRAMUSCULAR; INTRAVENOUS ONCE
Status: COMPLETED | OUTPATIENT
Start: 2022-09-15 | End: 2022-09-15

## 2022-09-15 RX ORDER — CIPROFLOXACIN 500 MG/1
500 TABLET, FILM COATED ORAL 2 TIMES DAILY
Qty: 14 TABLET | Refills: 0 | Status: SHIPPED | OUTPATIENT
Start: 2022-09-15 | End: 2022-09-22

## 2022-09-15 RX ORDER — KETOROLAC TROMETHAMINE 30 MG/ML
30 INJECTION, SOLUTION INTRAMUSCULAR; INTRAVENOUS ONCE
Status: COMPLETED | OUTPATIENT
Start: 2022-09-15 | End: 2022-09-15

## 2022-09-15 RX ORDER — METRONIDAZOLE 500 MG/1
500 TABLET ORAL 3 TIMES DAILY
Qty: 21 TABLET | Refills: 0 | Status: SHIPPED | OUTPATIENT
Start: 2022-09-15 | End: 2022-09-22

## 2022-09-15 RX ADMIN — KETOROLAC TROMETHAMINE 30 MG: 30 INJECTION, SOLUTION INTRAMUSCULAR; INTRAVENOUS at 11:34

## 2022-09-15 RX ADMIN — HYDROMORPHONE HYDROCHLORIDE 0.5 MG: 1 INJECTION, SOLUTION INTRAMUSCULAR; INTRAVENOUS; SUBCUTANEOUS at 12:38

## 2022-09-15 RX ADMIN — SODIUM CHLORIDE 1000 ML: 9 INJECTION, SOLUTION INTRAVENOUS at 11:33

## 2022-09-15 RX ADMIN — ONDANSETRON 4 MG: 2 INJECTION INTRAMUSCULAR; INTRAVENOUS at 11:34

## 2022-09-15 RX ADMIN — IOPAMIDOL 90 ML: 612 INJECTION, SOLUTION INTRAVENOUS at 12:18

## 2022-09-15 NOTE — ED PROVIDER NOTES
"Subjective   History of Present Illness  Patient presents to the ER with complaints of right lower quadrant pain that started at 230 this morning.  He states is gotten worse over the day.  He denies any nausea vomiting.  He does report a stomach bug last weekend.  He has no fever.  Pain is 10 out of 10.        Review of Systems   Constitutional: Negative for chills and fever.   Respiratory: Negative for cough and shortness of breath.    Cardiovascular: Negative for chest pain.   Gastrointestinal: Positive for abdominal pain. Negative for diarrhea, nausea and vomiting.   Genitourinary: Negative.    Musculoskeletal: Negative.    Skin: Negative.    Neurological: Negative.    Psychiatric/Behavioral: Negative.        Past Medical History:   Diagnosis Date   • Asthma    • Back pain    • Backache    • Benign hypertension    • GERD (gastroesophageal reflux disease)    • Headache    • Hypertension    • Migraine    • Osteoarthritis of multiple joints    • Paresthesia of both hands    • Seasonal allergies    • Tobacco dependence syndrome        Allergies   Allergen Reactions   • Codeine    • Diphenhydramine Other (See Comments)     \"gets cranky\"   • Phenergan [Promethazine Hcl] Nausea And Vomiting   • Promethazine Nausea And Vomiting       Past Surgical History:   Procedure Laterality Date   • COLONOSCOPY  2008   • DENTAL PROCEDURE     • ENDOSCOPY N/A 12/5/2019    Procedure: ESOPHAGOGASTRODUODENOSCOPY;  Surgeon: Karen Sheikh MD;  Location: [facility] ENDOSCOPY;  Service: Gastroenterology   • UPPER GASTROINTESTINAL ENDOSCOPY  12/05/2019       Family History   Problem Relation Age of Onset   • Hypertension Mother    • Brain cancer Father    • Skin cancer Paternal Grandfather    • Cancer Other    • Heart disease Other    • Osteoarthritis Other        Social History     Socioeconomic History   • Marital status:    Tobacco Use   • Smoking status: Current Every Day Smoker     Packs/day: 0.50     Years: 31.00     Pack years: " 15.50     Types: Cigarettes   • Smokeless tobacco: Former User     Types: Chew   • Tobacco comment: 1*800*quit*now   Substance and Sexual Activity   • Alcohol use: Yes     Comment: occassionally   • Drug use: No   • Sexual activity: Defer           Objective   Physical Exam  Vitals and nursing note reviewed.   Constitutional:       General: He is not in acute distress.     Appearance: He is well-developed. He is not ill-appearing.   HENT:      Head: Normocephalic and atraumatic.   Cardiovascular:      Rate and Rhythm: Normal rate and regular rhythm.      Heart sounds: Normal heart sounds. No murmur heard.  Pulmonary:      Effort: Pulmonary effort is normal. No respiratory distress.      Breath sounds: Normal breath sounds. No wheezing.   Abdominal:      General: Bowel sounds are normal. There is no distension.      Palpations: Abdomen is soft.      Tenderness: There is abdominal tenderness in the right upper quadrant. There is guarding. There is no rebound.   Musculoskeletal:         General: Normal range of motion.      Cervical back: Normal range of motion and neck supple.   Skin:     General: Skin is warm and dry.      Capillary Refill: Capillary refill takes less than 2 seconds.   Neurological:      Mental Status: He is alert and oriented to person, place, and time.      Coordination: Coordination normal.   Psychiatric:         Behavior: Behavior normal.         Thought Content: Thought content normal.         Judgment: Judgment normal.         Procedures  Results for orders placed or performed during the hospital encounter of 09/15/22   Comprehensive Metabolic Panel    Specimen: Blood   Result Value Ref Range    Glucose 76 65 - 99 mg/dL    BUN 9 6 - 20 mg/dL    Creatinine 1.07 0.76 - 1.27 mg/dL    Sodium 140 136 - 145 mmol/L    Potassium 3.5 3.5 - 5.2 mmol/L    Chloride 102 98 - 107 mmol/L    CO2 33.0 (H) 22.0 - 29.0 mmol/L    Calcium 9.3 8.6 - 10.5 mg/dL    Total Protein 6.9 6.0 - 8.5 g/dL    Albumin 4.10 3.50  - 5.20 g/dL    ALT (SGPT) 16 1 - 41 U/L    AST (SGOT) 16 1 - 40 U/L    Alkaline Phosphatase 85 39 - 117 U/L    Total Bilirubin 0.3 0.0 - 1.2 mg/dL    Globulin 2.8 gm/dL    A/G Ratio 1.5 g/dL    BUN/Creatinine Ratio 8.4 7.0 - 25.0    Anion Gap 5.0 5.0 - 15.0 mmol/L    eGFR 86.1 >60.0 mL/min/1.73   Lipase    Specimen: Blood   Result Value Ref Range    Lipase 23 13 - 60 U/L   Urinalysis With Microscopic If Indicated (No Culture) - Urine, Clean Catch    Specimen: Urine, Clean Catch   Result Value Ref Range    Color, UA Yellow Yellow, Straw, Dark Yellow, Suzy    Appearance, UA Clear Clear    pH, UA 7.5 5.0 - 9.0    Specific Gravity, UA 1.013 1.003 - 1.030    Glucose, UA Negative Negative    Ketones, UA Negative Negative    Bilirubin, UA Negative Negative    Blood, UA Negative Negative    Protein, UA Negative Negative    Leuk Esterase, UA Negative Negative    Nitrite, UA Negative Negative    Urobilinogen, UA 1.0 E.U./dL 0.2 - 1.0 E.U./dL   CBC Auto Differential    Specimen: Blood   Result Value Ref Range    WBC 11.36 (H) 3.40 - 10.80 10*3/mm3    RBC 5.16 4.14 - 5.80 10*6/mm3    Hemoglobin 16.2 13.0 - 17.7 g/dL    Hematocrit 44.9 37.5 - 51.0 %    MCV 87.0 79.0 - 97.0 fL    MCH 31.4 26.6 - 33.0 pg    MCHC 36.1 (H) 31.5 - 35.7 g/dL    RDW 12.7 12.3 - 15.4 %    RDW-SD 40.1 37.0 - 54.0 fl    MPV 10.5 6.0 - 12.0 fL    Platelets 309 140 - 450 10*3/mm3    Neutrophil % 54.0 42.7 - 76.0 %    Lymphocyte % 29.6 19.6 - 45.3 %    Monocyte % 6.7 5.0 - 12.0 %    Eosinophil % 8.2 (H) 0.3 - 6.2 %    Basophil % 1.1 0.0 - 1.5 %    Immature Grans % 0.4 0.0 - 0.5 %    Neutrophils, Absolute 6.15 1.70 - 7.00 10*3/mm3    Lymphocytes, Absolute 3.36 (H) 0.70 - 3.10 10*3/mm3    Monocytes, Absolute 0.76 0.10 - 0.90 10*3/mm3    Eosinophils, Absolute 0.93 (H) 0.00 - 0.40 10*3/mm3    Basophils, Absolute 0.12 0.00 - 0.20 10*3/mm3    Immature Grans, Absolute 0.04 0.00 - 0.05 10*3/mm3    nRBC 0.0 0.0 - 0.2 /100 WBC   Green Top (Gel)   Result Value Ref  Range    Extra Tube Hold for add-ons.    Lavender Top   Result Value Ref Range    Extra Tube hold for add-on    Gold Top - SST   Result Value Ref Range    Extra Tube Hold for add-ons.    Light Blue Top   Result Value Ref Range    Extra Tube Hold for add-ons.      CT Abdomen Pelvis With Contrast    Result Date: 9/15/2022  Narrative: PROCEDURE: Ct abdomen and pelvis with contrast REASON FOR EXAM: RLQ pain FINDINGS: Comparison study dated  September 2, 2019. Axial computer tomography sequential imaging was performed from the diaphragms through the symphysis pubis with IV contrast administration. Sagittal and coronal reformates was performed. This exam was performed according to our departmental dose optimization program, which includes automated exposure control, adjustment of the mA and/or KV according to patient size and/or use of iterative reconstruction technique.  Imaging through lung bases reveals no abnormality. The liver is normal. The gallbladder is normal. The biliary system appears within normal limits. The pancreas is normal. The spleen is normal with small accessory splenic lobules.. Bilateral adrenal glands are normal. Right kidney mid zone oval hypodense lesion which has Hounsfield units of 16 and measures 2.83 cm in greatest diameter. This lesion is consistent with benign renal cortical cyst. The right kidney and ureter are otherwise normal. Stable left kidney upper pole and lower pole small regions of renal cortical scarring. The left kidney and ureter are otherwise normal. The bladder is normal. The prostate is normal. The appendix is identified and appears normal. Mild fatty stranding surrounding the distal ileum in the lower right abdomen and superior right pelvic region. The hollow viscera is otherwise unremarkable. No lymphadenopathy in the abdomen or the pelvis. No acute osseous abnormality.     Impression:  IMPRESSION: 1. Benign right kidney mid zone renal cortical cyst as described above. No  follow-up is indicated. 2.Stable left kidney upper pole and lower pole small regions of renal cortical scarring. 3.Mild fatty stranding surrounding the distal ileum in the lower right abdomen and superior right pelvic region. This would be suspicious for mild inflammatory versus infectious ileitis. 4. The appendix is identified and appears normal. 5. Remainder CT abdomen and pelvis study with contrast is unremarkable.. Electronically signed by:  Charbel Julio MD  9/15/2022 1:19 PM CDT Workstation: [id number]             ED Course  ED Course as of 09/18/22 0933   Thu Sep 15, 2022   1546 Work-up reviewed with patient.  Will start patient on antibiotics and have him follow-up with his primary care provider [SH]      ED Course User Index  [SH] Lydia Cline APRN                                           [facility]    Final diagnoses:   Ileitis       ED Disposition  ED Disposition     ED Disposition   Discharge    Condition   Stable    Comment   --             Percy Loaiza MD  [address] [id number] [phone]    Schedule an appointment as soon as possible for a visit   ER follow up         Medication List      New Prescriptions    ciprofloxacin 500 MG tablet  Commonly known as: CIPRO  Take 1 tablet by mouth 2 (Two) Times a Day for 7 days.     metroNIDAZOLE 500 MG tablet  Commonly known as: FLAGYL  Take 1 tablet by mouth 3 (Three) Times a Day for 7 days.           Where to Get Your Medications      These medications were sent to [facility] - [place], [place] - [address] [facility] [phone]  - [phone]   [address] [facility] [address]    Phone: [phone]   · ciprofloxacin 500 MG tablet  · metroNIDAZOLE 500 MG tablet          Lydia Cline APRN  09/15/22 7304       Lydia Cline APRN  09/18/22 0902

## 2022-09-15 NOTE — DISCHARGE INSTRUCTIONS
Fill your prescriptions and take as directed.  Do not take your tizanidine while you are taking your antibiotics.  Hold this medication until your antibiotics are completed.  Follow-up with your primary care provider early next week for reevaluation.  Call today to schedule that appointment.  Return back to the ER if your symptoms worsen or change in presentation.

## 2022-10-06 ENCOUNTER — OFFICE VISIT (OUTPATIENT)
Dept: GASTROENTEROLOGY | Facility: CLINIC | Age: 47
End: 2022-10-06

## 2022-10-06 VITALS
HEIGHT: 70 IN | BODY MASS INDEX: 30.35 KG/M2 | SYSTOLIC BLOOD PRESSURE: 113 MMHG | WEIGHT: 212 LBS | DIASTOLIC BLOOD PRESSURE: 84 MMHG | HEART RATE: 86 BPM

## 2022-10-06 DIAGNOSIS — R10.31 RIGHT LOWER QUADRANT ABDOMINAL PAIN: Primary | ICD-10-CM

## 2022-10-06 DIAGNOSIS — R19.7 DIARRHEA, UNSPECIFIED TYPE: ICD-10-CM

## 2022-10-06 DIAGNOSIS — R11.0 NAUSEA: ICD-10-CM

## 2022-10-06 PROCEDURE — 99214 OFFICE O/P EST MOD 30 MIN: CPT | Performed by: INTERNAL MEDICINE

## 2022-10-06 RX ORDER — DICYCLOMINE HCL 20 MG
TABLET ORAL
COMMUNITY
Start: 2022-10-04

## 2022-10-06 RX ORDER — SODIUM PICOSULFATE, MAGNESIUM OXIDE, AND ANHYDROUS CITRIC ACID 10; 3.5; 12 MG/160ML; G/160ML; G/160ML
160 LIQUID ORAL SEE ADMIN INSTRUCTIONS
Qty: 320 ML | Refills: 0 | Status: SHIPPED | OUTPATIENT
Start: 2022-10-06 | End: 2022-10-24

## 2022-10-06 RX ORDER — POTASSIUM CHLORIDE 750 MG/1
TABLET, FILM COATED, EXTENDED RELEASE ORAL
COMMUNITY
Start: 2022-10-05

## 2022-10-06 RX ORDER — DEXTROSE AND SODIUM CHLORIDE 5; .45 G/100ML; G/100ML
30 INJECTION, SOLUTION INTRAVENOUS CONTINUOUS PRN
Status: CANCELLED | OUTPATIENT
Start: 2022-10-14

## 2022-10-06 RX ORDER — LISINOPRIL 20 MG/1
TABLET ORAL
COMMUNITY
Start: 2022-10-05

## 2022-10-11 ENCOUNTER — HOSPITAL ENCOUNTER (EMERGENCY)
Facility: HOSPITAL | Age: 47
Discharge: HOME | End: 2022-10-11
Attending: FAMILY MEDICINE | Admitting: FAMILY MEDICINE

## 2022-10-11 VITALS
HEIGHT: 70 IN | TEMPERATURE: 98.3 F | OXYGEN SATURATION: 98 % | RESPIRATION RATE: 18 BRPM | HEART RATE: 63 BPM | BODY MASS INDEX: 30.38 KG/M2 | WEIGHT: 212.2 LBS | DIASTOLIC BLOOD PRESSURE: 65 MMHG | SYSTOLIC BLOOD PRESSURE: 118 MMHG

## 2022-10-11 DIAGNOSIS — R10.31 RIGHT LOWER QUADRANT ABDOMINAL PAIN: Primary | ICD-10-CM

## 2022-10-11 LAB
ALBUMIN SERPL-MCNC: 4.2 G/DL (ref 3.5–5.2)
ALBUMIN/GLOB SERPL: 1.7 G/DL
ALP SERPL-CCNC: 76 U/L (ref 39–117)
ALT SERPL W P-5'-P-CCNC: 16 U/L (ref 1–41)
ANION GAP SERPL CALCULATED.3IONS-SCNC: 10 MMOL/L (ref 5–15)
AST SERPL-CCNC: 15 U/L (ref 1–40)
BASOPHILS # BLD AUTO: 0.11 10*3/MM3 (ref 0–0.2)
BASOPHILS NFR BLD AUTO: 0.9 % (ref 0–1.5)
BILIRUB SERPL-MCNC: 0.4 MG/DL (ref 0–1.2)
BILIRUB UR QL STRIP: NEGATIVE
BUN SERPL-MCNC: 14 MG/DL (ref 6–20)
BUN/CREAT SERPL: 12.7 (ref 7–25)
CALCIUM SPEC-SCNC: 9.4 MG/DL (ref 8.6–10.5)
CHLORIDE SERPL-SCNC: 107 MMOL/L (ref 98–107)
CLARITY UR: CLEAR
CO2 SERPL-SCNC: 25 MMOL/L (ref 22–29)
COLOR UR: YELLOW
CREAT SERPL-MCNC: 1.1 MG/DL (ref 0.76–1.27)
DEPRECATED RDW RBC AUTO: 41 FL (ref 37–54)
EGFRCR SERPLBLD CKD-EPI 2021: 83.3 ML/MIN/1.73
EOSINOPHIL # BLD AUTO: 0.99 10*3/MM3 (ref 0–0.4)
EOSINOPHIL NFR BLD AUTO: 8.1 % (ref 0.3–6.2)
ERYTHROCYTE [DISTWIDTH] IN BLOOD BY AUTOMATED COUNT: 12.6 % (ref 12.3–15.4)
GLOBULIN UR ELPH-MCNC: 2.5 GM/DL
GLUCOSE SERPL-MCNC: 119 MG/DL (ref 65–99)
GLUCOSE UR STRIP-MCNC: NEGATIVE MG/DL
HCT VFR BLD AUTO: 45.7 % (ref 37.5–51)
HGB BLD-MCNC: 15.8 G/DL (ref 13–17.7)
HGB UR QL STRIP.AUTO: NEGATIVE
HOLD SPECIMEN: NORMAL
HOLD SPECIMEN: NORMAL
IMM GRANULOCYTES # BLD AUTO: 0.05 10*3/MM3 (ref 0–0.05)
IMM GRANULOCYTES NFR BLD AUTO: 0.4 % (ref 0–0.5)
KETONES UR QL STRIP: NEGATIVE
LEUKOCYTE ESTERASE UR QL STRIP.AUTO: NEGATIVE
LIPASE SERPL-CCNC: 31 U/L (ref 13–60)
LYMPHOCYTES # BLD AUTO: 3.61 10*3/MM3 (ref 0.7–3.1)
LYMPHOCYTES NFR BLD AUTO: 29.6 % (ref 19.6–45.3)
MCH RBC QN AUTO: 30.6 PG (ref 26.6–33)
MCHC RBC AUTO-ENTMCNC: 34.6 G/DL (ref 31.5–35.7)
MCV RBC AUTO: 88.4 FL (ref 79–97)
MONOCYTES # BLD AUTO: 0.76 10*3/MM3 (ref 0.1–0.9)
MONOCYTES NFR BLD AUTO: 6.2 % (ref 5–12)
NEUTROPHILS NFR BLD AUTO: 54.8 % (ref 42.7–76)
NEUTROPHILS NFR BLD AUTO: 6.68 10*3/MM3 (ref 1.7–7)
NITRITE UR QL STRIP: NEGATIVE
NRBC BLD AUTO-RTO: 0 /100 WBC (ref 0–0.2)
PH UR STRIP.AUTO: 5.5 [PH] (ref 5–9)
PLATELET # BLD AUTO: 283 10*3/MM3 (ref 140–450)
PMV BLD AUTO: 10.7 FL (ref 6–12)
POTASSIUM SERPL-SCNC: 4.3 MMOL/L (ref 3.5–5.2)
PROT SERPL-MCNC: 6.7 G/DL (ref 6–8.5)
PROT UR QL STRIP: NEGATIVE
RBC # BLD AUTO: 5.17 10*6/MM3 (ref 4.14–5.8)
SODIUM SERPL-SCNC: 142 MMOL/L (ref 136–145)
SP GR UR STRIP: 1.02 (ref 1–1.03)
UROBILINOGEN UR QL STRIP: NORMAL
WBC NRBC COR # BLD: 12.2 10*3/MM3 (ref 3.4–10.8)
WHOLE BLOOD HOLD COAG: NORMAL
WHOLE BLOOD HOLD SPECIMEN: NORMAL

## 2022-10-11 PROCEDURE — 81003 URINALYSIS AUTO W/O SCOPE: CPT | Performed by: FAMILY MEDICINE

## 2022-10-11 PROCEDURE — 85025 COMPLETE CBC W/AUTO DIFF WBC: CPT

## 2022-10-11 PROCEDURE — 80053 COMPREHEN METABOLIC PANEL: CPT

## 2022-10-11 PROCEDURE — 83690 ASSAY OF LIPASE: CPT

## 2022-10-11 PROCEDURE — 99284 EMERGENCY DEPT VISIT MOD MDM: CPT

## 2022-10-11 RX ORDER — SODIUM CHLORIDE 0.9 % (FLUSH) 0.9 %
10 SYRINGE (ML) INJECTION AS NEEDED
Status: DISCONTINUED | OUTPATIENT
Start: 2022-10-11 | End: 2022-10-11 | Stop reason: HOSPADM

## 2022-10-11 RX ORDER — HYDROCODONE BITARTRATE AND ACETAMINOPHEN 5; 325 MG/1; MG/1
1 TABLET ORAL EVERY 6 HOURS PRN
Qty: 12 TABLET | Refills: 0 | Status: SHIPPED | OUTPATIENT
Start: 2022-10-11 | End: 2022-10-24

## 2022-10-11 RX ORDER — HYDROCODONE BITARTRATE AND ACETAMINOPHEN 7.5; 325 MG/1; MG/1
1 TABLET ORAL ONCE
Status: COMPLETED | OUTPATIENT
Start: 2022-10-11 | End: 2022-10-11

## 2022-10-11 RX ADMIN — HYDROCODONE BITARTRATE AND ACETAMINOPHEN 1 TABLET: 7.5; 325 TABLET ORAL at 17:33

## 2022-10-11 NOTE — ED PROVIDER NOTES
Subjective   History of Present Illness  Patient presents to the emergency department with abdominal pain that has been present for the past several weeks.  His initial CT scan of his abdomen pelvis was positive for bowel inflammation that was treated with outpatient antibiotics.  Patient had a repeat CAT scan of his abdomen pelvis performed 1-1/2 weeks ago that was negative for any acute findings.  He has an appointment in 3 days with Dr. Sheikh.    Abdominal Pain  Pain location:  RLQ  Pain quality: aching    Pain radiates to:  Does not radiate  Pain severity:  Moderate  Timing:  Intermittent  Progression:  Waxing and waning  Relieved by:  Lying down  Worsened by:  Position changes and movement  Associated symptoms: no chest pain, no chills, no cough, no diarrhea, no dysuria, no fatigue, no fever, no nausea, no shortness of breath, no sore throat and no vomiting    Nausea  The primary symptoms include abdominal pain. Primary symptoms do not include fever, fatigue, nausea, vomiting, diarrhea, dysuria, myalgias or rash.   The illness does not include chills.   Vomiting  The primary symptoms include abdominal pain. Primary symptoms do not include fever, fatigue, nausea, vomiting, diarrhea, dysuria, myalgias or rash.   The illness does not include chills.       Review of Systems   Constitutional: Negative for appetite change, chills, diaphoresis, fatigue and fever.   HENT: Negative for congestion, ear discharge, ear pain, nosebleeds, rhinorrhea, sinus pressure, sore throat and trouble swallowing.    Eyes: Negative for discharge and redness.   Respiratory: Negative for apnea, cough, chest tightness, shortness of breath and wheezing.    Cardiovascular: Negative for chest pain.   Gastrointestinal: Positive for abdominal pain. Negative for diarrhea, nausea and vomiting.   Endocrine: Negative for polyuria.   Genitourinary: Negative for dysuria, frequency and urgency.   Musculoskeletal: Negative for myalgias and neck pain.  "  Skin: Negative for color change and rash.   Allergic/Immunologic: Negative for immunocompromised state.   Neurological: Negative for dizziness, seizures, syncope, weakness, light-headedness and headaches.   Hematological: Negative for adenopathy. Does not bruise/bleed easily.   Psychiatric/Behavioral: Negative for behavioral problems and confusion.   All other systems reviewed and are negative.      Past Medical History:   Diagnosis Date   • Asthma    • Back pain    • Backache    • Benign hypertension    • GERD (gastroesophageal reflux disease)    • Headache    • Hypertension    • Migraine    • Osteoarthritis of multiple joints    • Paresthesia of both hands    • Seasonal allergies    • Tobacco dependence syndrome        Allergies   Allergen Reactions   • Codeine    • Diphenhydramine Other (See Comments)     \"gets cranky\"   • Phenergan [Promethazine Hcl] Nausea And Vomiting   • Promethazine Nausea And Vomiting       Past Surgical History:   Procedure Laterality Date   • COLONOSCOPY  2008   • DENTAL PROCEDURE     • ENDOSCOPY N/A 12/5/2019    Procedure: ESOPHAGOGASTRODUODENOSCOPY;  Surgeon: Karen Sheikh MD;  Location: [facility] ENDOSCOPY;  Service: Gastroenterology   • UPPER GASTROINTESTINAL ENDOSCOPY  12/05/2019       Family History   Problem Relation Age of Onset   • Hypertension Mother    • Brain cancer Father    • Skin cancer Paternal Grandfather    • Cancer Other    • Heart disease Other    • Osteoarthritis Other        Social History     Socioeconomic History   • Marital status:    Tobacco Use   • Smoking status: Every Day     Packs/day: 0.50     Years: 31.00     Pack years: 15.50     Types: Cigarettes   • Smokeless tobacco: Former     Types: Chew   • Tobacco comments:     1*800*quit*now   Substance and Sexual Activity   • Alcohol use: Yes     Comment: occassionally   • Drug use: No   • Sexual activity: Defer           Objective   Physical Exam  Vitals and nursing note reviewed.   Constitutional:     "   Appearance: He is well-developed.   HENT:      Head: Normocephalic and atraumatic.      Nose: Nose normal.   Eyes:      General: No scleral icterus.        Right eye: No discharge.         Left eye: No discharge.      Conjunctiva/sclera: Conjunctivae normal.      Pupils: Pupils are equal, round, and reactive to light.   Neck:      Trachea: No tracheal deviation.   Cardiovascular:      Rate and Rhythm: Normal rate and regular rhythm.      Heart sounds: Normal heart sounds. No murmur heard.  Pulmonary:      Effort: Pulmonary effort is normal. No respiratory distress.      Breath sounds: Normal breath sounds. No stridor. No wheezing or rales.   Abdominal:      General: Bowel sounds are normal. There is no distension.      Palpations: Abdomen is soft. There is no mass.      Tenderness: There is abdominal tenderness in the right lower quadrant. There is no guarding or rebound.      Comments: No bulging or significant tenderness in the right inguinal/pelvic region to suggest a current underlying inguinal hernia.   Musculoskeletal:      Cervical back: Normal range of motion and neck supple.   Skin:     General: Skin is warm and dry.      Findings: No erythema or rash.   Neurological:      Mental Status: He is alert and oriented to person, place, and time.      Coordination: Coordination normal.   Psychiatric:         Behavior: Behavior normal.         Thought Content: Thought content normal.         Procedures           ED Course        Labs Reviewed   COMPREHENSIVE METABOLIC PANEL - Abnormal; Notable for the following components:       Result Value    Glucose 119 (*)     All other components within normal limits    Narrative:     GFR Normal >60  Chronic Kidney Disease <60  Kidney Failure <15     CBC WITH AUTO DIFFERENTIAL - Abnormal; Notable for the following components:    WBC 12.20 (*)     Eosinophil % 8.1 (*)     Lymphocytes, Absolute 3.61 (*)     Eosinophils, Absolute 0.99 (*)     All other components within normal  limits   LIPASE - Normal   URINALYSIS W/ CULTURE IF INDICATED - Normal    Narrative:     In absence of clinical symptoms, the presence of pyuria, bacteria, and/or nitrites on the urinalysis result does not correlate with infection.  Urine microscopic not indicated.   RAINBOW DRAW    Narrative:     The following orders were created for panel order [place] Draw.  Procedure                               Abnormality         Status                     ---------                               -----------         ------                     Green Top (Gel)[[id number]]                                  Final result               Lavender Top[[id number]]                                     Final result               Gold Top - SST[[id number]]                                   Final result               Light Blue Top[[id number]]                                   Final result                 Please view results for these tests on the individual orders.   CBC AND DIFFERENTIAL    Narrative:     The following orders were created for panel order CBC & Differential.  Procedure                               Abnormality         Status                     ---------                               -----------         ------                     CBC Auto Differential[[id number]]        Abnormal            Final result                 Please view results for these tests on the individual orders.   GREEN TOP   LAVENDER TOP   GOLD TOP - SST   LIGHT BLUE TOP       No orders to display                                    GIDEON reviewed by Messi Berg MD       [facility]    Final diagnoses:   Right lower quadrant abdominal pain       ED Disposition  ED Disposition     ED Disposition   Discharge    Condition   Stable    Comment   --             Karen Sheikh MD  [address] [place] [address] DR GAMBLE FLZENAIDA  [facility] [address] [phone]    In 3 days  As scheduled, Even if well         Medication List      Changed    * HYDROcodone-acetaminophen  MG per  tablet  Commonly known as: NORCO  What changed: Another medication with the same name was added. Make sure you understand how and when to take each.     * HYDROcodone-acetaminophen 5-325 MG per tablet  Commonly known as: NORCO  Take 1 tablet by mouth Every 6 (Six) Hours As Needed for Moderate Pain.  What changed: You were already taking a medication with the same name, and this prescription was added. Make sure you understand how and when to take each.         * This list has 2 medication(s) that are the same as other medications prescribed for you. Read the directions carefully, and ask your doctor or other care provider to review them with you.               Where to Get Your Medications      These medications were sent to [id number] - [place], [place] - [address] [place] [address] AT [facility] [address] - [phone]  - [phone] [address] [address]    Phone: [phone]   · HYDROcodone-acetaminophen 5-325 MG per tablet          Messi Berg MD  10/11/22 5500

## 2022-10-14 ENCOUNTER — ANESTHESIA (OUTPATIENT)
Dept: GASTROENTEROLOGY | Facility: HOSPITAL | Age: 47
End: 2022-10-14

## 2022-10-14 ENCOUNTER — ANESTHESIA EVENT (OUTPATIENT)
Dept: GASTROENTEROLOGY | Facility: HOSPITAL | Age: 47
End: 2022-10-14

## 2022-10-14 ENCOUNTER — HOSPITAL ENCOUNTER (OUTPATIENT)
Facility: HOSPITAL | Age: 47
Setting detail: HOSPITAL OUTPATIENT SURGERY
Discharge: HOME | End: 2022-10-14
Attending: INTERNAL MEDICINE | Admitting: INTERNAL MEDICINE

## 2022-10-14 VITALS
RESPIRATION RATE: 20 BRPM | HEIGHT: 70 IN | HEART RATE: 64 BPM | WEIGHT: 210.54 LBS | DIASTOLIC BLOOD PRESSURE: 70 MMHG | SYSTOLIC BLOOD PRESSURE: 110 MMHG | BODY MASS INDEX: 30.14 KG/M2 | OXYGEN SATURATION: 98 % | TEMPERATURE: 96.6 F

## 2022-10-14 DIAGNOSIS — R10.31 RIGHT LOWER QUADRANT ABDOMINAL PAIN: ICD-10-CM

## 2022-10-14 DIAGNOSIS — R19.7 DIARRHEA, UNSPECIFIED TYPE: ICD-10-CM

## 2022-10-14 DIAGNOSIS — R11.0 NAUSEA: ICD-10-CM

## 2022-10-14 PROCEDURE — 43239 EGD BIOPSY SINGLE/MULTIPLE: CPT | Performed by: INTERNAL MEDICINE

## 2022-10-14 PROCEDURE — 45380 COLONOSCOPY AND BIOPSY: CPT | Performed by: INTERNAL MEDICINE

## 2022-10-14 PROCEDURE — 25010000002 PROPOFOL 10 MG/ML EMULSION: Performed by: NURSE ANESTHETIST, CERTIFIED REGISTERED

## 2022-10-14 PROCEDURE — 45385 COLONOSCOPY W/LESION REMOVAL: CPT | Performed by: INTERNAL MEDICINE

## 2022-10-14 RX ORDER — LIDOCAINE HYDROCHLORIDE 20 MG/ML
INJECTION, SOLUTION INFILTRATION; PERINEURAL AS NEEDED
Status: DISCONTINUED | OUTPATIENT
Start: 2022-10-14 | End: 2022-10-14 | Stop reason: SURG

## 2022-10-14 RX ORDER — PROPOFOL 10 MG/ML
VIAL (ML) INTRAVENOUS AS NEEDED
Status: DISCONTINUED | OUTPATIENT
Start: 2022-10-14 | End: 2022-10-14 | Stop reason: SURG

## 2022-10-14 RX ORDER — DEXTROSE AND SODIUM CHLORIDE 5; .45 G/100ML; G/100ML
30 INJECTION, SOLUTION INTRAVENOUS CONTINUOUS PRN
Status: DISCONTINUED | OUTPATIENT
Start: 2022-10-14 | End: 2022-10-14 | Stop reason: HOSPADM

## 2022-10-14 RX ADMIN — PROPOFOL 30 MG: 10 INJECTION, EMULSION INTRAVENOUS at 13:40

## 2022-10-14 RX ADMIN — PROPOFOL 30 MG: 10 INJECTION, EMULSION INTRAVENOUS at 13:55

## 2022-10-14 RX ADMIN — PROPOFOL 30 MG: 10 INJECTION, EMULSION INTRAVENOUS at 13:38

## 2022-10-14 RX ADMIN — PROPOFOL 30 MG: 10 INJECTION, EMULSION INTRAVENOUS at 13:49

## 2022-10-14 RX ADMIN — PROPOFOL 30 MG: 10 INJECTION, EMULSION INTRAVENOUS at 13:43

## 2022-10-14 RX ADMIN — PROPOFOL 30 MG: 10 INJECTION, EMULSION INTRAVENOUS at 13:46

## 2022-10-14 RX ADMIN — DEXTROSE AND SODIUM CHLORIDE 30 ML/HR: 5; 450 INJECTION, SOLUTION INTRAVENOUS at 12:41

## 2022-10-14 RX ADMIN — PROPOFOL 100 MG: 10 INJECTION, EMULSION INTRAVENOUS at 13:36

## 2022-10-14 RX ADMIN — PROPOFOL 30 MG: 10 INJECTION, EMULSION INTRAVENOUS at 13:52

## 2022-10-14 RX ADMIN — LIDOCAINE HYDROCHLORIDE 100 MG: 20 INJECTION, SOLUTION INFILTRATION; PERINEURAL at 13:36

## 2022-10-14 RX ADMIN — PROPOFOL 30 MG: 10 INJECTION, EMULSION INTRAVENOUS at 13:58

## 2022-10-14 NOTE — H&P
Chief Complaint   Patient presents with   • Abdominal Pain       rlq            Subjective       Marko Izaguirre is a 47 y.o. male.     History of Present Illness  Patient presented to GI clinic complaining of right lower quadrant pain for past 3 weeks.  Pain is intermittent, moderate, nonradiating and is associated with nausea with vomiting and diarrhea.  He was seen at the emergency room and subsequent CT abdomen and pelvis was consistent with ileitis.  He completed antibiotic therapy with continued symptoms.  Subsequently seen at the [facility] and CT angiogram was unremarkable.  GERD is well controlled with PPI.  Currently on Phenergan, Prilosec and Bentyl.  Denied NSAID usage.  Also denied recent travel, antibiotic usage, unusual food intake or antibiotic usage prior to the onset of symptoms.        The following portions of the patient's history were reviewed and updated as appropriate:   Medical History        Past Medical History:   Diagnosis Date   • Asthma     • Back pain     • Backache     • Benign hypertension     • GERD (gastroesophageal reflux disease)     • Headache     • Hypertension     • Migraine     • Osteoarthritis of multiple joints     • Paresthesia of both hands     • Seasonal allergies     • Tobacco dependence syndrome           Surgical History         Past Surgical History:   Procedure Laterality Date   • COLONOSCOPY   2008   • DENTAL PROCEDURE       • ENDOSCOPY N/A 12/5/2019     Procedure: ESOPHAGOGASTRODUODENOSCOPY;  Surgeon: Karen Sheikh MD;  Location: [facility] ENDOSCOPY;  Service: Gastroenterology   • UPPER GASTROINTESTINAL ENDOSCOPY   12/05/2019               Family History   Problem Relation Age of Onset   • Hypertension Mother     • Brain cancer Father     • Skin cancer Paternal Grandfather     • Cancer Other     • Heart disease Other     • Osteoarthritis Other                Prior to Admission medications    Medication Sig Start Date End Date Taking? Authorizing  Provider   calcipotriene (DOVONEX) 0.005 % cream   11/1/21   Yes Emergency, Nurse Karthik RN   cetirizine (zyrTEC) 10 MG tablet Take 10 mg by mouth Daily. 11/9/21   Yes Emergency, Nurse Epic, RN   dicyclomine (BENTYL) 20 MG tablet   10/4/22   Yes ProviderSupriya MD   ergocalciferol (ERGOCALCIFEROL) 1.25 MG (30805 UT) capsule Take 1 capsule by mouth 1 (One) Time Per Week. 8/30/21   Yes Emergency, Nurse Karthik RN   fluticasone (FLONASE) 50 MCG/ACT nasal spray 1 spray into each nostril Daily. 4/10/17   Yes Diana Andino MD   gabapentin (NEURONTIN) 800 MG tablet TAKE 1 TABLET BY MOUTH THREE TIMES DAILY AS NEEDED FOR PAIN. MAY FILL 11/4/21 11/4/21   Yes Emergency, Nurse Karthik RN   hydrochlorothiazide (HYDRODIURIL) 25 MG tablet Take 25 mg by mouth. 8/7/17   Yes Provider, MD Supriya   ibuprofen (ADVIL,MOTRIN) 800 MG tablet As Needed. 10/23/19   Yes Provider, MD Supriya   lisinopril (PRINIVIL,ZESTRIL) 20 MG tablet   10/5/22   Yes Provider, MD Supriya   omeprazole (priLOSEC) 40 MG capsule Take 40 mg by mouth Daily. 11/9/21   Yes Emergency, Nurse ALEJANDRO Angeles   ondansetron ODT (ZOFRAN-ODT) 4 MG disintegrating tablet Take 1 tablet by mouth Every 6 (Six) Hours As Needed for Nausea or Vomiting. 9/7/17   Yes Cristina Caruso PA   potassium chloride 10 MEQ CR tablet   10/5/22   Yes Provider, MD Supriya   SUMAtriptan (IMITREX) 50 MG tablet Take 50 mg by mouth As Needed for Migraine. 6/8/17   Yes ProviderSupriya MD   TiZANidine (ZANAFLEX) 4 MG capsule Every 6 (Six) Hours.     Yes Emergency, Nurse Karthik RN   VENTOLIN  (90 BASE) MCG/ACT inhaler Inhale 2 puffs Every 4 (Four) Hours As Needed for Wheezing. 4/10/17   Yes Diana Andino MD   Clenpiq 10-3.5-12 MG-GM -GM/160ML solution Take 160 mL by mouth See Admin Instructions. 10/6/22     Karen Sheikh MD   HYDROcodone-acetaminophen (NORCO) 5-325 MG per tablet Take 1 tablet by mouth Every 6 (Six) Hours As Needed for Moderate Pain. 10/11/22     oMrena  "Messi PASTOR MD   topiramate (TOPAMAX) 25 MG tablet Take 25 mg by mouth. 1/28/21 1/29/22   Emergency, Nurse Karthik, RN            Allergies   Allergen Reactions   • Codeine     • Diphenhydramine Other (See Comments)       \"gets cranky\"   • Phenergan [Promethazine Hcl] Nausea And Vomiting      Social History   Social History            Socioeconomic History   • Marital status:    Tobacco Use   • Smoking status: Every Day       Packs/day: 0.50       Years: 31.00       Pack years: 15.50       Types: Cigarettes   • Smokeless tobacco: Former       Types: Chew   • Tobacco comments:       1*800*quit*now   Vaping Use   • Vaping Use: Former   Substance and Sexual Activity   • Alcohol use: Yes       Comment: occassionally   • Drug use: No   • Sexual activity: Defer            Review of Systems  Review of Systems   Constitutional: Negative for chills, fatigue, fever and unexpected weight change.   HENT: Negative for congestion, ear discharge, hearing loss, nosebleeds and sore throat.    Eyes: Negative for pain, discharge and redness.   Respiratory: Negative for cough, chest tightness, shortness of breath and wheezing.    Cardiovascular: Negative for chest pain and palpitations.   Gastrointestinal: Positive for abdominal pain, diarrhea, nausea and vomiting. Negative for abdominal distention, blood in stool and constipation.   Endocrine: Negative for cold intolerance, polydipsia, polyphagia and polyuria.   Genitourinary: Negative for dysuria, flank pain, frequency, hematuria and urgency.   Musculoskeletal: Negative for arthralgias, back pain, joint swelling and myalgias.   Skin: Negative for color change, pallor and rash.   Neurological: Negative for tremors, seizures, syncope, weakness and headaches.   Hematological: Negative for adenopathy. Does not bruise/bleed easily.   Psychiatric/Behavioral: Negative for behavioral problems, confusion, dysphoric mood, hallucinations and suicidal ideas. The patient is not " "nervous/anxious.                     /84 (BP Location: Left arm)   Pulse 86   Ht 177.8 cm (70\")   Wt 96.2 kg (212 lb)   BMI 30.42 kg/m²            Objective       Physical Exam  Constitutional:       Appearance: He is well-developed.   HENT:      Head: Normocephalic and atraumatic.   Eyes:      Conjunctiva/sclera: Conjunctivae normal.      Pupils: Pupils are equal, round, and reactive to light.   Neck:      Thyroid: No thyromegaly.   Cardiovascular:      Rate and Rhythm: Normal rate and regular rhythm.      Heart sounds: Normal heart sounds. No murmur heard.  Pulmonary:      Effort: Pulmonary effort is normal.      Breath sounds: Normal breath sounds. No wheezing.   Abdominal:      General: Bowel sounds are normal. There is no distension.      Palpations: Abdomen is soft. There is no mass.      Tenderness: There is no abdominal tenderness.      Hernia: No hernia is present.   Genitourinary:     Comments: No lesions noted  Musculoskeletal:         General: No tenderness. Normal range of motion.      Cervical back: Normal range of motion and neck supple.   Lymphadenopathy:      Cervical: No cervical adenopathy.   Skin:     General: Skin is warm and dry.      Findings: No rash.   Neurological:      Mental Status: He is alert and oriented to person, place, and time.      Cranial Nerves: No cranial nerve deficit.   Psychiatric:         Thought Content: Thought content normal.                 Admission on 09/15/2022, Discharged on 09/15/2022   Component Date Value Ref Range Status   • Glucose 09/15/2022 76  65 - 99 mg/dL Final   • BUN 09/15/2022 9  6 - 20 mg/dL Final   • Creatinine 09/15/2022 1.07  0.76 - 1.27 mg/dL Final   • Sodium 09/15/2022 140  136 - 145 mmol/L Final   • Potassium 09/15/2022 3.5  3.5 - 5.2 mmol/L Final   • Chloride 09/15/2022 102  98 - 107 mmol/L Final   • CO2 09/15/2022 33.0 (H)  22.0 - 29.0 mmol/L Final   • Calcium 09/15/2022 9.3  8.6 - 10.5 mg/dL Final   • Total Protein 09/15/2022 6.9  " 6.0 - 8.5 g/dL Final   • Albumin 09/15/2022 4.10  3.50 - 5.20 g/dL Final   • ALT (SGPT) 09/15/2022 16  1 - 41 U/L Final   • AST (SGOT) 09/15/2022 16  1 - 40 U/L Final   • Alkaline Phosphatase 09/15/2022 85  39 - 117 U/L Final   • Total Bilirubin 09/15/2022 0.3  0.0 - 1.2 mg/dL Final   • Globulin 09/15/2022 2.8  gm/dL Final   • A/G Ratio 09/15/2022 1.5  g/dL Final   • BUN/Creatinine Ratio 09/15/2022 8.4  7.0 - 25.0 Final   • Anion Gap 09/15/2022 5.0  5.0 - 15.0 mmol/L Final   • eGFR 09/15/2022 86.1  >60.0 mL/min/1.73 Final     National Kidney Foundation and American Society of Nephrology (ASN) Task Force recommended calculation based on the Chronic Kidney Disease Epidemiology Collaboration (CKD-EPI) equation refit without adjustment for race.   • Lipase 09/15/2022 23  13 - 60 U/L Final   • Color, UA 09/15/2022 Yellow  Yellow, Straw, Dark Yellow, Suzy Final   • Appearance, UA 09/15/2022 Clear  Clear Final   • pH, UA 09/15/2022 7.5  5.0 - 9.0 Final   • Specific Gravity, UA 09/15/2022 1.013  1.003 - 1.030 Final   • Glucose, UA 09/15/2022 Negative  Negative Final   • Ketones, UA 09/15/2022 Negative  Negative Final   • Bilirubin, UA 09/15/2022 Negative  Negative Final   • Blood, UA 09/15/2022 Negative  Negative Final   • Protein, UA 09/15/2022 Negative  Negative Final   • Leuk Esterase, UA 09/15/2022 Negative  Negative Final   • Nitrite, UA 09/15/2022 Negative  Negative Final   • Urobilinogen, UA 09/15/2022 1.0 E.U./dL  0.2 - 1.0 E.U./dL Final   • Extra Tube 09/15/2022 Hold for add-ons.    Final     Auto resulted.   • Extra Tube 09/15/2022 hold for add-on    Final     Auto resulted   • Extra Tube 09/15/2022 Hold for add-ons.    Final     Auto resulted.   • Extra Tube 09/15/2022 Hold for add-ons.    Final     Auto resulted   • WBC 09/15/2022 11.36 (H)  3.40 - 10.80 10*3/mm3 Final   • RBC 09/15/2022 5.16  4.14 - 5.80 10*6/mm3 Final   • Hemoglobin 09/15/2022 16.2  13.0 - 17.7 g/dL Final   • Hematocrit 09/15/2022 44.9  37.5  - 51.0 % Final   • MCV 09/15/2022 87.0  79.0 - 97.0 fL Final   • MCH 09/15/2022 31.4  26.6 - 33.0 pg Final   • MCHC 09/15/2022 36.1 (H)  31.5 - 35.7 g/dL Final   • RDW 09/15/2022 12.7  12.3 - 15.4 % Final   • RDW-SD 09/15/2022 40.1  37.0 - 54.0 fl Final   • MPV 09/15/2022 10.5  6.0 - 12.0 fL Final   • Platelets 09/15/2022 309  140 - 450 10*3/mm3 Final   • Neutrophil % 09/15/2022 54.0  42.7 - 76.0 % Final   • Lymphocyte % 09/15/2022 29.6  19.6 - 45.3 % Final   • Monocyte % 09/15/2022 6.7  5.0 - 12.0 % Final   • Eosinophil % 09/15/2022 8.2 (H)  0.3 - 6.2 % Final   • Basophil % 09/15/2022 1.1  0.0 - 1.5 % Final   • Immature Grans % 09/15/2022 0.4  0.0 - 0.5 % Final   • Neutrophils, Absolute 09/15/2022 6.15  1.70 - 7.00 10*3/mm3 Final   • Lymphocytes, Absolute 09/15/2022 3.36 (H)  0.70 - 3.10 10*3/mm3 Final   • Monocytes, Absolute 09/15/2022 0.76  0.10 - 0.90 10*3/mm3 Final   • Eosinophils, Absolute 09/15/2022 0.93 (H)  0.00 - 0.40 10*3/mm3 Final   • Basophils, Absolute 09/15/2022 0.12  0.00 - 0.20 10*3/mm3 Final   • Immature Grans, Absolute 09/15/2022 0.04  0.00 - 0.05 10*3/mm3 Final   • nRBC 09/15/2022 0.0  0.0 - 0.2 /100 WBC Final            Assessment & Plan         1. Right lower quadrant abdominal pain    2. Diarrhea, unspecified type    3. Nausea    1.Right lower quadrant pain with diarrhea and ileitis upon CT rule out IBD.  Continue Bentyl and proceed with colonoscopy for further evaluation.  2.  Abdominal pain with nausea and vomiting rule out gastritis, peptic ulcer disease and pancreaticobiliary pathology.  Continue Prilosec and Phenergan.  Proceed with EGD for further evaluation.  3.  History of colon polyps, schedule colonoscopy for reevaluation.  4.  GERD, well controlled with Prilosec.  Continue Prilosec and antireflux lifestyle.  5.  Obesity, recommend exercise and diet control.        Orders placed during this encounter include:  Orders Placed This Encounter   Procedures   • Follow Anesthesia Guidelines  / Standing Orders       Standing Status:   Future   • Obtain Informed Consent       Standing Status:   Future       Order Specific Question:   Informed Consent Given For       Answer:   egd and colonoscopy         ESOPHAGOGASTRODUODENOSCOPY (N/A), COLONOSCOPY (N/A)     Review and/or summary of lab tests, radiology, procedures, medications. Review and summary of old records and obtaining of history. The risks and benefits of my recommendations, as well as other treatment options were discussed with the patient today. Questions were answered.          New Medications Ordered This Visit   Medications   • Clenpiq 10-3.5-12 MG-GM -GM/160ML solution       Sig: Take 160 mL by mouth See Admin Instructions.       Dispense:  320 mL       Refill:  0         Follow-up: No follow-ups on file.                             Results for orders placed or performed during the hospital encounter of 10/11/22   Gold Top - SST   Result Value Ref Range     Extra Tube Hold for add-ons.     Green Top (Gel)   Result Value Ref Range     Extra Tube Hold for add-ons.     Urinalysis With Culture If Indicated - Urine, Clean Catch     Specimen: Urine, Clean Catch   Result Value Ref Range     Color, UA Yellow Yellow, Straw, Dark Yellow, Suzy     Appearance, UA Clear Clear     pH, UA 5.5 5.0 - 9.0     Specific Gravity, UA 1.018 1.003 - 1.030     Glucose, UA Negative Negative     Ketones, UA Negative Negative     Bilirubin, UA Negative Negative     Blood, UA Negative Negative     Protein, UA Negative Negative     Leuk Esterase, UA Negative Negative     Nitrite, UA Negative Negative     Urobilinogen, UA 0.2 E.U./dL 0.2 - 1.0 E.U./dL   CBC Auto Differential     Specimen: Blood   Result Value Ref Range     WBC 12.20 (H) 3.40 - 10.80 10*3/mm3     RBC 5.17 4.14 - 5.80 10*6/mm3     Hemoglobin 15.8 13.0 - 17.7 g/dL     Hematocrit 45.7 37.5 - 51.0 %     MCV 88.4 79.0 - 97.0 fL     MCH 30.6 26.6 - 33.0 pg     MCHC 34.6 31.5 - 35.7 g/dL     RDW 12.6 12.3 - 15.4  %     RDW-SD 41.0 37.0 - 54.0 fl     MPV 10.7 6.0 - 12.0 fL     Platelets 283 140 - 450 10*3/mm3     Neutrophil % 54.8 42.7 - 76.0 %     Lymphocyte % 29.6 19.6 - 45.3 %     Monocyte % 6.2 5.0 - 12.0 %     Eosinophil % 8.1 (H) 0.3 - 6.2 %     Basophil % 0.9 0.0 - 1.5 %     Immature Grans % 0.4 0.0 - 0.5 %     Neutrophils, Absolute 6.68 1.70 - 7.00 10*3/mm3     Lymphocytes, Absolute 3.61 (H) 0.70 - 3.10 10*3/mm3     Monocytes, Absolute 0.76 0.10 - 0.90 10*3/mm3     Eosinophils, Absolute 0.99 (H) 0.00 - 0.40 10*3/mm3     Basophils, Absolute 0.11 0.00 - 0.20 10*3/mm3     Immature Grans, Absolute 0.05 0.00 - 0.05 10*3/mm3     nRBC 0.0 0.0 - 0.2 /100 WBC   Lavender Top   Result Value Ref Range     Extra Tube hold for add-on     Light Blue Top   Result Value Ref Range     Extra Tube Hold for add-ons.     Lipase     Specimen: Blood   Result Value Ref Range     Lipase 31 13 - 60 U/L   Comprehensive Metabolic Panel     Specimen: Blood   Result Value Ref Range     Glucose 119 (H) 65 - 99 mg/dL     BUN 14 6 - 20 mg/dL     Creatinine 1.10 0.76 - 1.27 mg/dL     Sodium 142 136 - 145 mmol/L     Potassium 4.3 3.5 - 5.2 mmol/L     Chloride 107 98 - 107 mmol/L     CO2 25.0 22.0 - 29.0 mmol/L     Calcium 9.4 8.6 - 10.5 mg/dL     Total Protein 6.7 6.0 - 8.5 g/dL     Albumin 4.20 3.50 - 5.20 g/dL     ALT (SGPT) 16 1 - 41 U/L     AST (SGOT) 15 1 - 40 U/L     Alkaline Phosphatase 76 39 - 117 U/L     Total Bilirubin 0.4 0.0 - 1.2 mg/dL     Globulin 2.5 gm/dL     A/G Ratio 1.7 g/dL     BUN/Creatinine Ratio 12.7 7.0 - 25.0     Anion Gap 10.0 5.0 - 15.0 mmol/L     eGFR 83.3 >60.0 mL/min/1.73   Results for orders placed or performed during the hospital encounter of 09/15/22   Gold Top - SST   Result Value Ref Range     Extra Tube Hold for add-ons.     Green Top (Gel)   Result Value Ref Range     Extra Tube Hold for add-ons.     Urinalysis With Microscopic If Indicated (No Culture) - Urine, Clean Catch     Specimen: Urine, Clean Catch   Result  Value Ref Range     Color, UA Yellow Yellow, Straw, Dark Yellow, Suzy     Appearance, UA Clear Clear     pH, UA 7.5 5.0 - 9.0     Specific Gravity, UA 1.013 1.003 - 1.030     Glucose, UA Negative Negative     Ketones, UA Negative Negative     Bilirubin, UA Negative Negative     Blood, UA Negative Negative     Protein, UA Negative Negative     Leuk Esterase, UA Negative Negative     Nitrite, UA Negative Negative     Urobilinogen, UA 1.0 E.U./dL 0.2 - 1.0 E.U./dL   CBC Auto Differential     Specimen: Blood   Result Value Ref Range     WBC 11.36 (H) 3.40 - 10.80 10*3/mm3     RBC 5.16 4.14 - 5.80 10*6/mm3     Hemoglobin 16.2 13.0 - 17.7 g/dL     Hematocrit 44.9 37.5 - 51.0 %     MCV 87.0 79.0 - 97.0 fL     MCH 31.4 26.6 - 33.0 pg     MCHC 36.1 (H) 31.5 - 35.7 g/dL     RDW 12.7 12.3 - 15.4 %     RDW-SD 40.1 37.0 - 54.0 fl     MPV 10.5 6.0 - 12.0 fL     Platelets 309 140 - 450 10*3/mm3     Neutrophil % 54.0 42.7 - 76.0 %     Lymphocyte % 29.6 19.6 - 45.3 %     Monocyte % 6.7 5.0 - 12.0 %     Eosinophil % 8.2 (H) 0.3 - 6.2 %     Basophil % 1.1 0.0 - 1.5 %     Immature Grans % 0.4 0.0 - 0.5 %     Neutrophils, Absolute 6.15 1.70 - 7.00 10*3/mm3     Lymphocytes, Absolute 3.36 (H) 0.70 - 3.10 10*3/mm3     Monocytes, Absolute 0.76 0.10 - 0.90 10*3/mm3     Eosinophils, Absolute 0.93 (H) 0.00 - 0.40 10*3/mm3     Basophils, Absolute 0.12 0.00 - 0.20 10*3/mm3     Immature Grans, Absolute 0.04 0.00 - 0.05 10*3/mm3     nRBC 0.0 0.0 - 0.2 /100 WBC   Lavender Top   Result Value Ref Range     Extra Tube hold for add-on     Light Blue Top   Result Value Ref Range     Extra Tube Hold for add-ons.        *Note: Due to a large number of results and/or encounters for the requested time period, some results have not been displayed. A complete set of results can be found in Results Review.

## 2022-10-14 NOTE — ANESTHESIA PREPROCEDURE EVALUATION
Anesthesia Evaluation     Patient summary reviewed and Nursing notes reviewed   NPO Solid Status: > 8 hours  NPO Liquid Status: > 2 hours           Airway   Mallampati: III  TM distance: >3 FB  Neck ROM: full  Possible difficult intubation  Dental - normal exam     Pulmonary - normal exam   (+) a smoker Current Smoked day of surgery, asthma,  Cardiovascular - normal exam    (+) hypertension less than 2 medications,       Neuro/Psych  (+) headaches,    (-) numbness  GI/Hepatic/Renal/Endo    (+) obesity,  GERD well controlled,      Musculoskeletal     (+) back pain,   Abdominal  - normal exam   Substance History      OB/GYN          Other        (-) arthritis                    Anesthesia Plan    ASA 3     general   total IV anesthesia  intravenous induction     Anesthetic plan, risks, benefits, and alternatives have been provided, discussed and informed consent has been obtained with: patient.    Plan discussed with CRNA.

## 2022-10-14 NOTE — PROGRESS NOTES
Chief Complaint   Patient presents with   • Abdominal Pain     rlq       Subjective    Marko Izaguirre is a 47 y.o. male.    History of Present Illness  Patient presented to GI clinic complaining of right lower quadrant pain for past 3 weeks.  Pain is intermittent, moderate, nonradiating and is associated with nausea with vomiting and diarrhea.  He was seen at the emergency room and subsequent CT abdomen and pelvis was consistent with ileitis.  He completed antibiotic therapy with continued symptoms.  Subsequently seen at the [facility] and CT angiogram was unremarkable.  GERD is well controlled with PPI.  Currently on Phenergan, Prilosec and Bentyl.  Denied NSAID usage.  Also denied recent travel, antibiotic usage, unusual food intake or antibiotic usage prior to the onset of symptoms.       The following portions of the patient's history were reviewed and updated as appropriate:   Past Medical History:   Diagnosis Date   • Asthma    • Back pain    • Backache    • Benign hypertension    • GERD (gastroesophageal reflux disease)    • Headache    • Hypertension    • Migraine    • Osteoarthritis of multiple joints    • Paresthesia of both hands    • Seasonal allergies    • Tobacco dependence syndrome      Past Surgical History:   Procedure Laterality Date   • COLONOSCOPY  2008   • DENTAL PROCEDURE     • ENDOSCOPY N/A 12/5/2019    Procedure: ESOPHAGOGASTRODUODENOSCOPY;  Surgeon: Karen Sheikh MD;  Location: [facility] ENDOSCOPY;  Service: Gastroenterology   • UPPER GASTROINTESTINAL ENDOSCOPY  12/05/2019     Family History   Problem Relation Age of Onset   • Hypertension Mother    • Brain cancer Father    • Skin cancer Paternal Grandfather    • Cancer Other    • Heart disease Other    • Osteoarthritis Other        Prior to Admission medications    Medication Sig Start Date End Date Taking? Authorizing Provider   calcipotriene (DOVONEX) 0.005 % cream  11/1/21  Yes Emergency, Nurse Karthik, RN   cetirizine  (zyrTEC) 10 MG tablet Take 10 mg by mouth Daily. 11/9/21  Yes Emergency, Nurse Epic, RN   dicyclomine (BENTYL) 20 MG tablet  10/4/22  Yes ProviderSupriya MD   ergocalciferol (ERGOCALCIFEROL) 1.25 MG (09312 UT) capsule Take 1 capsule by mouth 1 (One) Time Per Week. 8/30/21  Yes Emergency, Nurse Epic, RN   fluticasone (FLONASE) 50 MCG/ACT nasal spray 1 spray into each nostril Daily. 4/10/17  Yes Diana Andino MD   gabapentin (NEURONTIN) 800 MG tablet TAKE 1 TABLET BY MOUTH THREE TIMES DAILY AS NEEDED FOR PAIN. MAY FILL 11/4/21 11/4/21  Yes Emergency, Nurse ALEJANDRO Angeles   hydrochlorothiazide (HYDRODIURIL) 25 MG tablet Take 25 mg by mouth. 8/7/17  Yes ProviderSupriya MD   ibuprofen (ADVIL,MOTRIN) 800 MG tablet As Needed. 10/23/19  Yes ProviderSupriya MD   lisinopril (PRINIVIL,ZESTRIL) 20 MG tablet  10/5/22  Yes Provider, MD Supriya   omeprazole (priLOSEC) 40 MG capsule Take 40 mg by mouth Daily. 11/9/21  Yes Emergency, Nurse ALEJANDRO Angeles   ondansetron ODT (ZOFRAN-ODT) 4 MG disintegrating tablet Take 1 tablet by mouth Every 6 (Six) Hours As Needed for Nausea or Vomiting. 9/7/17  Yes Cristina Caruso PA   potassium chloride 10 MEQ CR tablet  10/5/22  Yes ProviderSupriya MD   SUMAtriptan (IMITREX) 50 MG tablet Take 50 mg by mouth As Needed for Migraine. 6/8/17  Yes Provider, MD Supriya   TiZANidine (ZANAFLEX) 4 MG capsule Every 6 (Six) Hours.   Yes Emergency, Nurse ALEJANDRO Angeles   VENTOLIN  (90 BASE) MCG/ACT inhaler Inhale 2 puffs Every 4 (Four) Hours As Needed for Wheezing. 4/10/17  Yes Diana Andino MD   Clenpiq 10-3.5-12 MG-GM -GM/160ML solution Take 160 mL by mouth See Admin Instructions. 10/6/22   Karen Sheikh MD   HYDROcodone-acetaminophen (NORCO) 5-325 MG per tablet Take 1 tablet by mouth Every 6 (Six) Hours As Needed for Moderate Pain. 10/11/22   Messi Berg MD   topiramate (TOPAMAX) 25 MG tablet Take 25 mg by mouth. 1/28/21 1/29/22  Emergency, Nurse Karthik, RN  "    Allergies   Allergen Reactions   • Codeine    • Diphenhydramine Other (See Comments)     \"gets cranky\"   • Phenergan [Promethazine Hcl] Nausea And Vomiting     Social History     Socioeconomic History   • Marital status:    Tobacco Use   • Smoking status: Every Day     Packs/day: 0.50     Years: 31.00     Pack years: 15.50     Types: Cigarettes   • Smokeless tobacco: Former     Types: Chew   • Tobacco comments:     1*800*quit*now   Vaping Use   • Vaping Use: Former   Substance and Sexual Activity   • Alcohol use: Yes     Comment: occassionally   • Drug use: No   • Sexual activity: Defer       Review of Systems  Review of Systems   Constitutional: Negative for chills, fatigue, fever and unexpected weight change.   HENT: Negative for congestion, ear discharge, hearing loss, nosebleeds and sore throat.    Eyes: Negative for pain, discharge and redness.   Respiratory: Negative for cough, chest tightness, shortness of breath and wheezing.    Cardiovascular: Negative for chest pain and palpitations.   Gastrointestinal: Positive for abdominal pain, diarrhea, nausea and vomiting. Negative for abdominal distention, blood in stool and constipation.   Endocrine: Negative for cold intolerance, polydipsia, polyphagia and polyuria.   Genitourinary: Negative for dysuria, flank pain, frequency, hematuria and urgency.   Musculoskeletal: Negative for arthralgias, back pain, joint swelling and myalgias.   Skin: Negative for color change, pallor and rash.   Neurological: Negative for tremors, seizures, syncope, weakness and headaches.   Hematological: Negative for adenopathy. Does not bruise/bleed easily.   Psychiatric/Behavioral: Negative for behavioral problems, confusion, dysphoric mood, hallucinations and suicidal ideas. The patient is not nervous/anxious.         /84 (BP Location: Left arm)   Pulse 86   Ht 177.8 cm (70\")   Wt 96.2 kg (212 lb)   BMI 30.42 kg/m²     Objective    Physical Exam  Constitutional:  "      Appearance: He is well-developed.   HENT:      Head: Normocephalic and atraumatic.   Eyes:      Conjunctiva/sclera: Conjunctivae normal.      Pupils: Pupils are equal, round, and reactive to light.   Neck:      Thyroid: No thyromegaly.   Cardiovascular:      Rate and Rhythm: Normal rate and regular rhythm.      Heart sounds: Normal heart sounds. No murmur heard.  Pulmonary:      Effort: Pulmonary effort is normal.      Breath sounds: Normal breath sounds. No wheezing.   Abdominal:      General: Bowel sounds are normal. There is no distension.      Palpations: Abdomen is soft. There is no mass.      Tenderness: There is no abdominal tenderness.      Hernia: No hernia is present.   Genitourinary:     Comments: No lesions noted  Musculoskeletal:         General: No tenderness. Normal range of motion.      Cervical back: Normal range of motion and neck supple.   Lymphadenopathy:      Cervical: No cervical adenopathy.   Skin:     General: Skin is warm and dry.      Findings: No rash.   Neurological:      Mental Status: He is alert and oriented to person, place, and time.      Cranial Nerves: No cranial nerve deficit.   Psychiatric:         Thought Content: Thought content normal.       Admission on 09/15/2022, Discharged on 09/15/2022   Component Date Value Ref Range Status   • Glucose 09/15/2022 76  65 - 99 mg/dL Final   • BUN 09/15/2022 9  6 - 20 mg/dL Final   • Creatinine 09/15/2022 1.07  0.76 - 1.27 mg/dL Final   • Sodium 09/15/2022 140  136 - 145 mmol/L Final   • Potassium 09/15/2022 3.5  3.5 - 5.2 mmol/L Final   • Chloride 09/15/2022 102  98 - 107 mmol/L Final   • CO2 09/15/2022 33.0 (H)  22.0 - 29.0 mmol/L Final   • Calcium 09/15/2022 9.3  8.6 - 10.5 mg/dL Final   • Total Protein 09/15/2022 6.9  6.0 - 8.5 g/dL Final   • Albumin 09/15/2022 4.10  3.50 - 5.20 g/dL Final   • ALT (SGPT) 09/15/2022 16  1 - 41 U/L Final   • AST (SGOT) 09/15/2022 16  1 - 40 U/L Final   • Alkaline Phosphatase 09/15/2022 85  39 - 117  U/L Final   • Total Bilirubin 09/15/2022 0.3  0.0 - 1.2 mg/dL Final   • Globulin 09/15/2022 2.8  gm/dL Final   • A/G Ratio 09/15/2022 1.5  g/dL Final   • BUN/Creatinine Ratio 09/15/2022 8.4  7.0 - 25.0 Final   • Anion Gap 09/15/2022 5.0  5.0 - 15.0 mmol/L Final   • eGFR 09/15/2022 86.1  >60.0 mL/min/1.73 Final    National Kidney Foundation and American Society of Nephrology (ASN) Task Force recommended calculation based on the Chronic Kidney Disease Epidemiology Collaboration (CKD-EPI) equation refit without adjustment for race.   • Lipase 09/15/2022 23  13 - 60 U/L Final   • Color, UA 09/15/2022 Yellow  Yellow, Straw, Dark Yellow, Suzy Final   • Appearance, UA 09/15/2022 Clear  Clear Final   • pH, UA 09/15/2022 7.5  5.0 - 9.0 Final   • Specific Gravity, UA 09/15/2022 1.013  1.003 - 1.030 Final   • Glucose, UA 09/15/2022 Negative  Negative Final   • Ketones, UA 09/15/2022 Negative  Negative Final   • Bilirubin, UA 09/15/2022 Negative  Negative Final   • Blood, UA 09/15/2022 Negative  Negative Final   • Protein, UA 09/15/2022 Negative  Negative Final   • Leuk Esterase, UA 09/15/2022 Negative  Negative Final   • Nitrite, UA 09/15/2022 Negative  Negative Final   • Urobilinogen, UA 09/15/2022 1.0 E.U./dL  0.2 - 1.0 E.U./dL Final   • Extra Tube 09/15/2022 Hold for add-ons.   Final    Auto resulted.   • Extra Tube 09/15/2022 hold for add-on   Final    Auto resulted   • Extra Tube 09/15/2022 Hold for add-ons.   Final    Auto resulted.   • Extra Tube 09/15/2022 Hold for add-ons.   Final    Auto resulted   • WBC 09/15/2022 11.36 (H)  3.40 - 10.80 10*3/mm3 Final   • RBC 09/15/2022 5.16  4.14 - 5.80 10*6/mm3 Final   • Hemoglobin 09/15/2022 16.2  13.0 - 17.7 g/dL Final   • Hematocrit 09/15/2022 44.9  37.5 - 51.0 % Final   • MCV 09/15/2022 87.0  79.0 - 97.0 fL Final   • MCH 09/15/2022 31.4  26.6 - 33.0 pg Final   • MCHC 09/15/2022 36.1 (H)  31.5 - 35.7 g/dL Final   • RDW 09/15/2022 12.7  12.3 - 15.4 % Final   • RDW-SD  09/15/2022 40.1  37.0 - 54.0 fl Final   • MPV 09/15/2022 10.5  6.0 - 12.0 fL Final   • Platelets 09/15/2022 309  140 - 450 10*3/mm3 Final   • Neutrophil % 09/15/2022 54.0  42.7 - 76.0 % Final   • Lymphocyte % 09/15/2022 29.6  19.6 - 45.3 % Final   • Monocyte % 09/15/2022 6.7  5.0 - 12.0 % Final   • Eosinophil % 09/15/2022 8.2 (H)  0.3 - 6.2 % Final   • Basophil % 09/15/2022 1.1  0.0 - 1.5 % Final   • Immature Grans % 09/15/2022 0.4  0.0 - 0.5 % Final   • Neutrophils, Absolute 09/15/2022 6.15  1.70 - 7.00 10*3/mm3 Final   • Lymphocytes, Absolute 09/15/2022 3.36 (H)  0.70 - 3.10 10*3/mm3 Final   • Monocytes, Absolute 09/15/2022 0.76  0.10 - 0.90 10*3/mm3 Final   • Eosinophils, Absolute 09/15/2022 0.93 (H)  0.00 - 0.40 10*3/mm3 Final   • Basophils, Absolute 09/15/2022 0.12  0.00 - 0.20 10*3/mm3 Final   • Immature Grans, Absolute 09/15/2022 0.04  0.00 - 0.05 10*3/mm3 Final   • nRBC 09/15/2022 0.0  0.0 - 0.2 /100 WBC Final     Assessment & Plan      1. Right lower quadrant abdominal pain    2. Diarrhea, unspecified type    3. Nausea    1.Right lower quadrant pain with diarrhea and ileitis upon CT rule out IBD.  Continue Bentyl and proceed with colonoscopy for further evaluation.  2.  Abdominal pain with nausea and vomiting rule out gastritis, peptic ulcer disease and pancreaticobiliary pathology.  Continue Prilosec and Phenergan.  Proceed with EGD for further evaluation.  3.  History of colon polyps, schedule colonoscopy for reevaluation.  4.  GERD, well controlled with Prilosec.  Continue Prilosec and antireflux lifestyle.  5.  Obesity, recommend exercise and diet control.      Orders placed during this encounter include:  Orders Placed This Encounter   Procedures   • Follow Anesthesia Guidelines / Standing Orders     Standing Status:   Future   • Obtain Informed Consent     Standing Status:   Future     Order Specific Question:   Informed Consent Given For     Answer:   egd and colonoscopy        ESOPHAGOGASTRODUODENOSCOPY (N/A), COLONOSCOPY (N/A)    Review and/or summary of lab tests, radiology, procedures, medications. Review and summary of old records and obtaining of history. The risks and benefits of my recommendations, as well as other treatment options were discussed with the patient today. Questions were answered.    New Medications Ordered This Visit   Medications   • Clenpiq 10-3.5-12 MG-GM -GM/160ML solution     Sig: Take 160 mL by mouth See Admin Instructions.     Dispense:  320 mL     Refill:  0       Follow-up: No follow-ups on file.               Results for orders placed or performed during the hospital encounter of 10/11/22   Gold Top - SST   Result Value Ref Range    Extra Tube Hold for add-ons.    Green Top (Gel)   Result Value Ref Range    Extra Tube Hold for add-ons.    Urinalysis With Culture If Indicated - Urine, Clean Catch    Specimen: Urine, Clean Catch   Result Value Ref Range    Color, UA Yellow Yellow, Straw, Dark Yellow, Suzy    Appearance, UA Clear Clear    pH, UA 5.5 5.0 - 9.0    Specific Gravity, UA 1.018 1.003 - 1.030    Glucose, UA Negative Negative    Ketones, UA Negative Negative    Bilirubin, UA Negative Negative    Blood, UA Negative Negative    Protein, UA Negative Negative    Leuk Esterase, UA Negative Negative    Nitrite, UA Negative Negative    Urobilinogen, UA 0.2 E.U./dL 0.2 - 1.0 E.U./dL   CBC Auto Differential    Specimen: Blood   Result Value Ref Range    WBC 12.20 (H) 3.40 - 10.80 10*3/mm3    RBC 5.17 4.14 - 5.80 10*6/mm3    Hemoglobin 15.8 13.0 - 17.7 g/dL    Hematocrit 45.7 37.5 - 51.0 %    MCV 88.4 79.0 - 97.0 fL    MCH 30.6 26.6 - 33.0 pg    MCHC 34.6 31.5 - 35.7 g/dL    RDW 12.6 12.3 - 15.4 %    RDW-SD 41.0 37.0 - 54.0 fl    MPV 10.7 6.0 - 12.0 fL    Platelets 283 140 - 450 10*3/mm3    Neutrophil % 54.8 42.7 - 76.0 %    Lymphocyte % 29.6 19.6 - 45.3 %    Monocyte % 6.2 5.0 - 12.0 %    Eosinophil % 8.1 (H) 0.3 - 6.2 %    Basophil % 0.9 0.0 - 1.5 %     Immature Grans % 0.4 0.0 - 0.5 %    Neutrophils, Absolute 6.68 1.70 - 7.00 10*3/mm3    Lymphocytes, Absolute 3.61 (H) 0.70 - 3.10 10*3/mm3    Monocytes, Absolute 0.76 0.10 - 0.90 10*3/mm3    Eosinophils, Absolute 0.99 (H) 0.00 - 0.40 10*3/mm3    Basophils, Absolute 0.11 0.00 - 0.20 10*3/mm3    Immature Grans, Absolute 0.05 0.00 - 0.05 10*3/mm3    nRBC 0.0 0.0 - 0.2 /100 WBC   Lavender Top   Result Value Ref Range    Extra Tube hold for add-on    Light Blue Top   Result Value Ref Range    Extra Tube Hold for add-ons.    Lipase    Specimen: Blood   Result Value Ref Range    Lipase 31 13 - 60 U/L   Comprehensive Metabolic Panel    Specimen: Blood   Result Value Ref Range    Glucose 119 (H) 65 - 99 mg/dL    BUN 14 6 - 20 mg/dL    Creatinine 1.10 0.76 - 1.27 mg/dL    Sodium 142 136 - 145 mmol/L    Potassium 4.3 3.5 - 5.2 mmol/L    Chloride 107 98 - 107 mmol/L    CO2 25.0 22.0 - 29.0 mmol/L    Calcium 9.4 8.6 - 10.5 mg/dL    Total Protein 6.7 6.0 - 8.5 g/dL    Albumin 4.20 3.50 - 5.20 g/dL    ALT (SGPT) 16 1 - 41 U/L    AST (SGOT) 15 1 - 40 U/L    Alkaline Phosphatase 76 39 - 117 U/L    Total Bilirubin 0.4 0.0 - 1.2 mg/dL    Globulin 2.5 gm/dL    A/G Ratio 1.7 g/dL    BUN/Creatinine Ratio 12.7 7.0 - 25.0    Anion Gap 10.0 5.0 - 15.0 mmol/L    eGFR 83.3 >60.0 mL/min/1.73   Results for orders placed or performed during the hospital encounter of 09/15/22   Gold Top - SST   Result Value Ref Range    Extra Tube Hold for add-ons.    Green Top (Gel)   Result Value Ref Range    Extra Tube Hold for add-ons.    Urinalysis With Microscopic If Indicated (No Culture) - Urine, Clean Catch    Specimen: Urine, Clean Catch   Result Value Ref Range    Color, UA Yellow Yellow, Straw, Dark Yellow, Suzy    Appearance, UA Clear Clear    pH, UA 7.5 5.0 - 9.0    Specific Gravity, UA 1.013 1.003 - 1.030    Glucose, UA Negative Negative    Ketones, UA Negative Negative    Bilirubin, UA Negative Negative    Blood, UA Negative Negative     Protein, UA Negative Negative    Leuk Esterase, UA Negative Negative    Nitrite, UA Negative Negative    Urobilinogen, UA 1.0 E.U./dL 0.2 - 1.0 E.U./dL   CBC Auto Differential    Specimen: Blood   Result Value Ref Range    WBC 11.36 (H) 3.40 - 10.80 10*3/mm3    RBC 5.16 4.14 - 5.80 10*6/mm3    Hemoglobin 16.2 13.0 - 17.7 g/dL    Hematocrit 44.9 37.5 - 51.0 %    MCV 87.0 79.0 - 97.0 fL    MCH 31.4 26.6 - 33.0 pg    MCHC 36.1 (H) 31.5 - 35.7 g/dL    RDW 12.7 12.3 - 15.4 %    RDW-SD 40.1 37.0 - 54.0 fl    MPV 10.5 6.0 - 12.0 fL    Platelets 309 140 - 450 10*3/mm3    Neutrophil % 54.0 42.7 - 76.0 %    Lymphocyte % 29.6 19.6 - 45.3 %    Monocyte % 6.7 5.0 - 12.0 %    Eosinophil % 8.2 (H) 0.3 - 6.2 %    Basophil % 1.1 0.0 - 1.5 %    Immature Grans % 0.4 0.0 - 0.5 %    Neutrophils, Absolute 6.15 1.70 - 7.00 10*3/mm3    Lymphocytes, Absolute 3.36 (H) 0.70 - 3.10 10*3/mm3    Monocytes, Absolute 0.76 0.10 - 0.90 10*3/mm3    Eosinophils, Absolute 0.93 (H) 0.00 - 0.40 10*3/mm3    Basophils, Absolute 0.12 0.00 - 0.20 10*3/mm3    Immature Grans, Absolute 0.04 0.00 - 0.05 10*3/mm3    nRBC 0.0 0.0 - 0.2 /100 WBC   Lavender Top   Result Value Ref Range    Extra Tube hold for add-on    Light Blue Top   Result Value Ref Range    Extra Tube Hold for add-ons.      *Note: Due to a large number of results and/or encounters for the requested time period, some results have not been displayed. A complete set of results can be found in Results Review.         This document has been electronically signed by Karen Sheikh MD on October 14, 2022 13:00 CDT

## 2022-10-14 NOTE — ANESTHESIA POSTPROCEDURE EVALUATION
Patient: Marko Izaguirre    Procedure Summary     Date: 10/14/22 Room / Location: [facility] ENDOSCOPY 3 / [facility] ENDOSCOPY    Anesthesia Start: 1331 Anesthesia Stop: 1358    Procedures:       ESOPHAGOGASTRODUODENOSCOPY      COLONOSCOPY Diagnosis:       Right lower quadrant abdominal pain      Diarrhea, unspecified type      Nausea      (Right lower quadrant abdominal pain [R10.31])      (Diarrhea, unspecified type [R19.7])      (Nausea [R11.0])    Surgeons: aKren Sheikh MD Provider: Arnulfo Parnell CRNA    Anesthesia Type: general ASA Status: 3          Anesthesia Type: general    Vitals  No vitals data found for the desired time range.          Post Anesthesia Care and Evaluation    Patient location during evaluation: bedside  Patient participation: complete - patient participated  Level of consciousness: awake and alert and sleepy but conscious  Pain score: 0  Pain management: adequate    Airway patency: patent  Anesthetic complications: No anesthetic complications  PONV Status: none  Cardiovascular status: acceptable  Respiratory status: acceptable  Hydration status: acceptable    Comments: ---------------------------               10/14/22                      1358         ---------------------------   BP:          124/86         Pulse:         79           Resp:          18           Temp:   98.4 °F (36.9 °C)   SpO2:          97%         ---------------------------

## 2022-10-17 ENCOUNTER — HOSPITAL ENCOUNTER (EMERGENCY)
Facility: HOSPITAL | Age: 47
End: 2022-10-17

## 2022-10-19 LAB — REF LAB TEST METHOD: NORMAL

## 2022-10-24 ENCOUNTER — OFFICE VISIT (OUTPATIENT)
Dept: GASTROENTEROLOGY | Facility: CLINIC | Age: 47
End: 2022-10-24

## 2022-10-24 VITALS
DIASTOLIC BLOOD PRESSURE: 90 MMHG | BODY MASS INDEX: 29.58 KG/M2 | HEIGHT: 70 IN | WEIGHT: 206.6 LBS | SYSTOLIC BLOOD PRESSURE: 127 MMHG | HEART RATE: 71 BPM

## 2022-10-24 DIAGNOSIS — R19.7 DIARRHEA, UNSPECIFIED TYPE: ICD-10-CM

## 2022-10-24 DIAGNOSIS — R10.31 RIGHT LOWER QUADRANT ABDOMINAL PAIN: Primary | ICD-10-CM

## 2022-10-24 PROCEDURE — 99213 OFFICE O/P EST LOW 20 MIN: CPT | Performed by: INTERNAL MEDICINE

## 2022-11-01 ENCOUNTER — APPOINTMENT (OUTPATIENT)
Dept: GENERAL RADIOLOGY | Facility: HOSPITAL | Age: 47
End: 2022-11-01

## 2022-11-01 ENCOUNTER — HOSPITAL ENCOUNTER (EMERGENCY)
Facility: HOSPITAL | Age: 47
Discharge: HOME | End: 2022-11-01
Attending: FAMILY MEDICINE | Admitting: FAMILY MEDICINE

## 2022-11-01 VITALS
BODY MASS INDEX: 30.35 KG/M2 | HEIGHT: 70 IN | OXYGEN SATURATION: 97 % | WEIGHT: 212 LBS | SYSTOLIC BLOOD PRESSURE: 110 MMHG | TEMPERATURE: 98.2 F | HEART RATE: 66 BPM | RESPIRATION RATE: 20 BRPM | DIASTOLIC BLOOD PRESSURE: 73 MMHG

## 2022-11-01 DIAGNOSIS — S39.012A STRAIN OF LUMBAR REGION, INITIAL ENCOUNTER: Primary | ICD-10-CM

## 2022-11-01 PROCEDURE — 96372 THER/PROPH/DIAG INJ SC/IM: CPT

## 2022-11-01 PROCEDURE — 25010000002 TRIAMCINOLONE PER 10 MG: Performed by: FAMILY MEDICINE

## 2022-11-01 PROCEDURE — 72100 X-RAY EXAM L-S SPINE 2/3 VWS: CPT

## 2022-11-01 PROCEDURE — 25010000002 KETOROLAC TROMETHAMINE PER 15 MG: Performed by: FAMILY MEDICINE

## 2022-11-01 PROCEDURE — 99283 EMERGENCY DEPT VISIT LOW MDM: CPT

## 2022-11-01 RX ORDER — TIZANIDINE HYDROCHLORIDE 4 MG/1
4 CAPSULE, GELATIN COATED ORAL 3 TIMES DAILY PRN
Qty: 21 CAPSULE | Refills: 0 | Status: SHIPPED | OUTPATIENT
Start: 2022-11-01 | End: 2022-11-08

## 2022-11-01 RX ORDER — TRIAMCINOLONE ACETONIDE 40 MG/ML
40 INJECTION, SUSPENSION INTRA-ARTICULAR; INTRAMUSCULAR ONCE
Status: COMPLETED | OUTPATIENT
Start: 2022-11-01 | End: 2022-11-01

## 2022-11-01 RX ORDER — CYCLOBENZAPRINE HCL 10 MG
10 TABLET ORAL 3 TIMES DAILY
Qty: 33 TABLET | Refills: 0 | COMMUNITY
Start: 2022-10-27 | End: 2022-11-01

## 2022-11-01 RX ORDER — KETOROLAC TROMETHAMINE 30 MG/ML
30 INJECTION, SOLUTION INTRAMUSCULAR; INTRAVENOUS ONCE
Status: COMPLETED | OUTPATIENT
Start: 2022-11-01 | End: 2022-11-01

## 2022-11-01 RX ADMIN — KETOROLAC TROMETHAMINE 30 MG: 30 INJECTION, SOLUTION INTRAMUSCULAR; INTRAVENOUS at 13:51

## 2022-11-01 RX ADMIN — TRIAMCINOLONE ACETONIDE 40 MG: 40 INJECTION, SUSPENSION INTRA-ARTICULAR; INTRAMUSCULAR at 13:50
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
## 2022-11-13 NOTE — PROGRESS NOTES
Chief Complaint   Patient presents with   • ENDO F/U       Subjective    Marko Izaguirre is a 47 y.o. male.    History of Present Illness  Patient presented to GI clinic for follow-up visit today.  Feels better currently.  Abdominal pain has improved.  Denied nausea or vomiting.  Diarrhea is improving.  Denies rectal bleeding or weight loss.  EGD was consistent with esophagitis, gastritis and hiatal hernia.  Path was consistent with reactive gastropathy.  Colonoscopy was consistent with adenomatous colon polyps and hemorrhoids.       The following portions of the patient's history were reviewed and updated as appropriate:   Past Medical History:   Diagnosis Date   • Asthma    • Back pain    • Backache    • Benign hypertension    • GERD (gastroesophageal reflux disease)    • Headache    • Hypertension    • Migraine    • Osteoarthritis of multiple joints    • Paresthesia of both hands    • Seasonal allergies    • Tobacco dependence syndrome      Past Surgical History:   Procedure Laterality Date   • COLONOSCOPY  2008   • COLONOSCOPY N/A 10/14/2022    Procedure: COLONOSCOPY;  Surgeon: Karen Sheikh MD;  Location: [facility] ENDOSCOPY;  Service: Gastroenterology;  Laterality: N/A;   • DENTAL PROCEDURE     • ENDOSCOPY N/A 12/5/2019    Procedure: ESOPHAGOGASTRODUODENOSCOPY;  Surgeon: Karen Sheikh MD;  Location: [facility] ENDOSCOPY;  Service: Gastroenterology   • ENDOSCOPY N/A 10/14/2022    Procedure: ESOPHAGOGASTRODUODENOSCOPY;  Surgeon: Karen Sheikh MD;  Location: [facility] ENDOSCOPY;  Service: Gastroenterology;  Laterality: N/A;   • UPPER GASTROINTESTINAL ENDOSCOPY  12/05/2019     Family History   Problem Relation Age of Onset   • Hypertension Mother    • Brain cancer Father    • Skin cancer Paternal Grandfather    • Cancer Other    • Heart disease Other    • Osteoarthritis Other        Prior to Admission medications    Medication Sig Start Date End Date Taking? Authorizing Provider   cetirizine (zyrTEC) 10  "MG tablet Take 10 mg by mouth Daily. 11/9/21  Yes Emergency, Nurse Epic, RN   dicyclomine (BENTYL) 20 MG tablet  10/4/22  Yes Provider, MD Supriya   ergocalciferol (ERGOCALCIFEROL) 1.25 MG (10206 UT) capsule Take 1 capsule by mouth 1 (One) Time Per Week. 8/30/21  Yes Emergency, Nurse Epic, RN   fluticasone (FLONASE) 50 MCG/ACT nasal spray 1 spray into each nostril Daily. 4/10/17  Yes Diana Andino MD   hydrochlorothiazide (HYDRODIURIL) 25 MG tablet Take 25 mg by mouth. 8/7/17  Yes ProviderSupriya MD   ibuprofen (ADVIL,MOTRIN) 800 MG tablet As Needed. 10/23/19  Yes Provider, MD Supriya   lisinopril (PRINIVIL,ZESTRIL) 20 MG tablet  10/5/22  Yes Provider, MD Supriya   omeprazole (priLOSEC) 40 MG capsule Take 40 mg by mouth Daily. 11/9/21  Yes Emergency, Nurse ALEJANDRO [place]   ondansetron ODT (ZOFRAN-ODT) 4 MG disintegrating tablet Take 1 tablet by mouth Every 6 (Six) Hours As Needed for Nausea or Vomiting. 9/7/17  Yes Cristina Caruso PA   potassium chloride 10 MEQ CR tablet  10/5/22  Yes Provider, MD Supriya   SUMAtriptan (IMITREX) 50 MG tablet Take 50 mg by mouth As Needed for Migraine. 6/8/17  Yes ProviderSupriya MD   VENTOLIN  (90 BASE) MCG/ACT inhaler Inhale 2 puffs Every 4 (Four) Hours As Needed for Wheezing. 4/10/17  Yes Diana Andino MD   topiramate (TOPAMAX) 25 MG tablet Take 25 mg by mouth. 1/28/21 1/29/22  Emergency, Nurse ALEJANDRO [place]     Allergies   Allergen Reactions   • Codeine    • Diphenhydramine Other (See Comments)     \"gets cranky\"   • Phenergan [Promethazine Hcl] Nausea And Vomiting     Social History     Socioeconomic History   • Marital status:    Tobacco Use   • Smoking status: Every Day     Packs/day: 0.50     Years: 31.00     Pack years: 15.50     Types: Cigarettes   • Smokeless tobacco: Former     Types: Chew   • Tobacco comments:     1*800*quit*now   Vaping Use   • Vaping Use: Former   Substance and Sexual Activity   • Alcohol use: Yes     Comment: " "occassionally   • Drug use: No   • Sexual activity: Defer       Review of Systems  Review of Systems   Constitutional: Negative for chills, fatigue, fever and unexpected weight change.   HENT: Negative for congestion, ear discharge, hearing loss, nosebleeds and sore throat.    Eyes: Negative for pain, discharge and redness.   Respiratory: Negative for cough, chest tightness, shortness of breath and wheezing.    Cardiovascular: Negative for chest pain and palpitations.   Gastrointestinal: Positive for diarrhea. Negative for abdominal distention, abdominal pain, blood in stool, constipation, nausea and vomiting.   Endocrine: Negative for cold intolerance, polydipsia, polyphagia and polyuria.   Genitourinary: Negative for dysuria, flank pain, frequency, hematuria and urgency.   Musculoskeletal: Negative for arthralgias, back pain, joint swelling and myalgias.   Skin: Negative for color change, pallor and rash.   Neurological: Negative for tremors, seizures, syncope, weakness and headaches.   Hematological: Negative for adenopathy. Does not bruise/bleed easily.   Psychiatric/Behavioral: Negative for behavioral problems, confusion, dysphoric mood, hallucinations and suicidal ideas. The patient is not nervous/anxious.         /90 (BP Location: Right arm)   Pulse 71   Ht 177.8 cm (70\")   Wt 93.7 kg (206 lb 9.6 oz)   BMI 29.64 kg/m²     Objective    Physical Exam  Constitutional:       Appearance: He is well-developed.   HENT:      Head: Normocephalic and atraumatic.   Eyes:      Conjunctiva/sclera: Conjunctivae normal.      Pupils: Pupils are equal, round, and reactive to light.   Neck:      Thyroid: No thyromegaly.   Cardiovascular:      Rate and Rhythm: Normal rate and regular rhythm.      Heart sounds: Normal heart sounds. No murmur heard.  Pulmonary:      Effort: Pulmonary effort is normal.      Breath sounds: Normal breath sounds. No wheezing.   Abdominal:      General: Bowel sounds are normal. There is no " distension.      Palpations: Abdomen is soft. There is no mass.      Tenderness: There is no abdominal tenderness.      Hernia: No hernia is present.   Genitourinary:     Comments: No lesions noted  Musculoskeletal:         General: No tenderness. Normal range of motion.      Cervical back: Normal range of motion and neck supple.   Lymphadenopathy:      Cervical: No cervical adenopathy.   Skin:     General: Skin is warm and dry.      Findings: No rash.   Neurological:      Mental Status: He is alert and oriented to person, place, and time.      Cranial Nerves: No cranial nerve deficit.   Psychiatric:         Thought Content: Thought content normal.       Admission on 10/14/2022, Discharged on 10/14/2022   Component Date Value Ref Range Status   • Reference Lab Report 10/14/2022    Final                    Value:[facility]  [address] [place] [address]  Phone: [phone] or [phone]  Fax: [phone]  Olayinka Avila M.D., Medical Director    PATIENT NAME                           LABORATORY NO.  [address]  HARDEEP ELIZABETH.                  [id number]  [id number]                         AGE              SEX  SSN           CLIENT REF #  [facility]           47      1975  M    [id number]   [id number]    [place]                       REQUESTING M.D.     ATTENDING M.D.     COPY [address] CHAH[address]  DATE COLLECTED      DATE RECEIVED      DATE REPORTED  10/14/2022          10/17/2022         10/19/2022    DIAGNOSIS:  A.   DUODENUM, BIOPSY:  No significant histologic abnormality  B.   GASTRIC ANTRUM, BIOPSY:  Reactive gastropathy  C.   GE JUNCTION:  Reactive gastric mucosa  Negative for specialized Tellez's                           mucosa  D.   SIGMOID COLON POLYP:  Tubular adenoma, multiple fragments  E.   COLON, BIOPSY:  No  "significant histologic abnormality    JBS/sdl    CLINICAL HISTORY:  Right lower quadrant abdominal pain, diarrhea, nausea    SPECIMENS RECEIVED:  A.  DUODENUM, BIOPSY  B.  GASTRIC ANTRUM, BIOPSY  C.  GE JUNCTION  D.  SIGMOID COLON POLYP  E.  COLON, BIOPSY    MICROSCOPIC DESCRIPTION:  Tissue blocks are prepared and slides are examined microscopically on all  specimens. See diagnosis for details.    Professional interpretation rendered by Peter Monique M.D. at [employer],  [employer], [address] [place] [address].    GROSS DESCRIPTION:  A.  Specimen is received in 1 formalin filled container labeled \"duodenum\"  consists of 3 pieces of tan-gray soft tissue measuring 0.9 x 0.3 x 0.2 cm in  aggregate.  All submitted in 1 cassette.  BKO    B.  Specimen is received in 1 formalin filled container labeled \"antrum\"  consists of 2 pieces of tan-gray soft tissue measuring 0.5 x 0.3 x 0.2 cm                           in  aggregate.  All submitted in 1 cassette.  C.  Specimen is received in 1 formalin filled container labeled \"EGJ\" consists  of 2 pieces of tan soft tissue measuring 0.6 x 0.3 x 0.2 cm in aggregate.  All  submitted in 1 cassette.  D.  Specimen is received in 1 formalin filled container labeled \"sigmoid colon  polyps\" consists of multiple pieces of tan soft tissue measuring 1.4 x 0.3 x  0.3 cm in aggregate.  Specimen submitted entirely in 1 cassette.  E.  Specimen is received in 1 formalin filled container labeled \"colonic  mucosa\" consists of 2 pieces of tan-gray soft tissue measuring 0.6 x 0.3 x  0.2 cm in aggregate.  All submitted in 1 cassette.  BKO    REVIEWED, DIAGNOSED AND ELECTRONICALLY  SIGNED BY:    Peter Monique M.D.  CPT CODES:  88305x5       Assessment & Plan    No diagnosis found..   1.  Abdominal pain with diarrhea, improving.  Continue Bentyl.  2.  Abdominal pain with nausea and vomiting, well controlled.  Continue PPI.  3.  Colon polyps, repeat colonoscopy in 3 years.  4.  GERD, well " controlled with PPI.  Continue PPI and antireflux lifestyle.  5.  Obesity, recommend exercise and diet control.    Orders placed during this encounter include:  No orders of the defined types were placed in this encounter.      * Surgery not found *    Review and/or summary of lab tests, radiology, procedures, medications. Review and summary of old records and obtaining of history. The risks and benefits of my recommendations, as well as other treatment options were discussed with the patient today. Questions were answered.    No orders of the defined types were placed in this encounter.      Follow-up: Return in about 3 months (around 2023).               Results for orders placed or performed during the hospital encounter of 10/14/22   TISSUE EXAM, [facility] (GI[facility]NA,[facility])    Specimen: A: Small Intestine, Duodenum; Tissue    B: Gastric, Antrum; Tissue    C: Esophagus; Tissue    D: Large Intestine, Sigmoid Colon; Polyp    E: Large Intestine; Tissue   Result Value Ref Range    Reference Lab Report       [facility]  [address] [place] [address]  Phone: [phone] or [phone]  Fax: [phone]  Olayinka Avila M.D., Medical Director    PATIENT NAME                           LABORATORY NO.  [address]  HARDEEP ELIZABETH.                  [id number]  [id number]                         AGE              SEX  N           CLIENT REF #  [facility]           47      1975  [place]    [id number]   [id number]    [place]                       REQUESTING M.D.     ATTENDING M.D.     COPY TO.  [address] [place] [address]             BRANDI TORRES  DATE COLLECTED      DATE RECEIVED      DATE REPORTED  10/14/2022          10/17/2022         10/19/2022    DIAGNOSIS:  A.   DUODENUM, BIOPSY:  No significant histologic abnormality  B.   GASTRIC ANTRUM, BIOPSY:  Reactive gastropathy  C.   GE  "JUNCTION:  Reactive gastric mucosa  Negative for specialized Tellez's  mucosa  D.   SIGMOID COLON POLYP:  Tubular adenoma, multiple fragments  E.   COLON, BIOPSY:  No significant histologic abnormality    JBS/sdl    CLINICAL HISTORY:  Right lower quadrant abdominal pain, diarrhea, nausea    SPECIMENS RECEIVED:  A.  DUODENUM, BIOPSY  B.  GASTRIC ANTRUM, BIOPSY  C.  GE JUNCTION  D.  SIGMOID COLON POLYP  E.  COLON, BIOPSY    MICROSCOPIC DESCRIPTION:  Tissue blocks are prepared and slides are examined microscopically on all  specimens. See diagnosis for details.    Professional interpretation rendered by Peter Monique M.D. at [facility], [address] [place] [address].    GROSS DESCRIPTION:  A.  Specimen is received in 1 formalin filled container labeled \"duodenum\"  consists of 3 pieces of tan-gray soft tissue measuring 0.9 x 0.3 x 0.2 cm in  aggregate.  All submitted in 1 cassette.  BKO    B.  Specimen is received in 1 formalin filled container labeled \"antrum\"  consists of 2 pieces of tan-gray soft tissue measuring 0.5 x 0.3 x 0.2 cm  in  aggregate.  All submitted in 1 cassette.  C.  Specimen is received in 1 formalin filled container labeled \"EGJ\" consists  of 2 pieces of tan soft tissue measuring 0.6 x 0.3 x 0.2 cm in aggregate.  All  submitted in 1 cassette.  D.  Specimen is received in 1 formalin filled container labeled \"sigmoid colon  polyps\" consists of multiple pieces of tan soft tissue measuring 1.4 x 0.3 x  0.3 cm in aggregate.  Specimen submitted entirely in 1 cassette.  E.  Specimen is received in 1 formalin filled container labeled \"colonic  mucosa\" consists of 2 pieces of tan-gray soft tissue measuring 0.6 x 0.3 x  0.2 cm in aggregate.  All submitted in 1 cassette.  BKO    REVIEWED, DIAGNOSED AND ELECTRONICALLY  SIGNED BY:    Peter Monique M.D.  CPT CODES:  88305x5     Results for orders placed or performed during the hospital encounter of 10/11/22   [facility]   Result " Value Ref Range    Extra Tube Hold for add-ons.    Green Top (Gel)   Result Value Ref Range    Extra Tube Hold for add-ons.    Urinalysis With Culture If Indicated - Urine, Clean Catch    Specimen: Urine, Clean Catch   Result Value Ref Range    Color, UA Yellow Yellow, Straw, Dark Yellow, Suzy    Appearance, UA Clear Clear    pH, UA 5.5 5.0 - 9.0    Specific Gravity, UA 1.018 1.003 - 1.030    Glucose, UA Negative Negative    Ketones, UA Negative Negative    Bilirubin, UA Negative Negative    Blood, UA Negative Negative    Protein, UA Negative Negative    Leuk Esterase, UA Negative Negative    Nitrite, UA Negative Negative    Urobilinogen, UA 0.2 E.U./dL 0.2 - 1.0 E.U./dL   CBC Auto Differential    Specimen: Blood   Result Value Ref Range    WBC 12.20 (H) 3.40 - 10.80 10*3/mm3    RBC 5.17 4.14 - 5.80 10*6/mm3    Hemoglobin 15.8 13.0 - 17.7 g/dL    Hematocrit 45.7 37.5 - 51.0 %    MCV 88.4 79.0 - 97.0 fL    MCH 30.6 26.6 - 33.0 pg    MCHC 34.6 31.5 - 35.7 g/dL    RDW 12.6 12.3 - 15.4 %    RDW-SD 41.0 37.0 - 54.0 fl    MPV 10.7 6.0 - 12.0 fL    Platelets 283 140 - 450 10*3/mm3    Neutrophil % 54.8 42.7 - 76.0 %    Lymphocyte % 29.6 19.6 - 45.3 %    Monocyte % 6.2 5.0 - 12.0 %    Eosinophil % 8.1 (H) 0.3 - 6.2 %    Basophil % 0.9 0.0 - 1.5 %    Immature Grans % 0.4 0.0 - 0.5 %    Neutrophils, Absolute 6.68 1.70 - 7.00 10*3/mm3    Lymphocytes, Absolute 3.61 (H) 0.70 - 3.10 10*3/mm3    Monocytes, Absolute 0.76 0.10 - 0.90 10*3/mm3    Eosinophils, Absolute 0.99 (H) 0.00 - 0.40 10*3/mm3    Basophils, Absolute 0.11 0.00 - 0.20 10*3/mm3    Immature Grans, Absolute 0.05 0.00 - 0.05 10*3/mm3    nRBC 0.0 0.0 - 0.2 /100 WBC   Lavender Top   Result Value Ref Range    Extra Tube hold for add-on    Light Blue Top   Result Value Ref Range    Extra Tube Hold for add-ons.    Lipase    Specimen: Blood   Result Value Ref Range    Lipase 31 13 - 60 U/L   Comprehensive Metabolic Panel    Specimen: Blood   Result Value Ref Range    Glucose  119 (H) 65 - 99 mg/dL    BUN 14 6 - 20 mg/dL    Creatinine 1.10 0.76 - 1.27 mg/dL    Sodium 142 136 - 145 mmol/L    Potassium 4.3 3.5 - 5.2 mmol/L    Chloride 107 98 - 107 mmol/L    CO2 25.0 22.0 - 29.0 mmol/L    Calcium 9.4 8.6 - 10.5 mg/dL    Total Protein 6.7 6.0 - 8.5 g/dL    Albumin 4.20 3.50 - 5.20 g/dL    ALT (SGPT) 16 1 - 41 U/L    AST (SGOT) 15 1 - 40 U/L    Alkaline Phosphatase 76 39 - 117 U/L    Total Bilirubin 0.4 0.0 - 1.2 mg/dL    Globulin 2.5 gm/dL    A/G Ratio 1.7 g/dL    BUN/Creatinine Ratio 12.7 7.0 - 25.0    Anion Gap 10.0 5.0 - 15.0 mmol/L    eGFR 83.3 >60.0 mL/min/1.73   Results for orders placed or performed during the hospital encounter of 09/15/22   Gold Top - SST   Result Value Ref Range    Extra Tube Hold for add-ons.    Green Top (Gel)   Result Value Ref Range    Extra Tube Hold for add-ons.    Urinalysis With Microscopic If Indicated (No Culture) - Urine, Clean Catch    Specimen: Urine, Clean Catch   Result Value Ref Range    Color, UA Yellow Yellow, Straw, Dark Yellow, Suzy    Appearance, UA Clear Clear    pH, UA 7.5 5.0 - 9.0    Specific Gravity, UA 1.013 1.003 - 1.030    Glucose, UA Negative Negative    Ketones, UA Negative Negative    Bilirubin, UA Negative Negative    Blood, UA Negative Negative    Protein, UA Negative Negative    Leuk Esterase, UA Negative Negative    Nitrite, UA Negative Negative    Urobilinogen, UA 1.0 E.U./dL 0.2 - 1.0 E.U./dL   CBC Auto Differential    Specimen: Blood   Result Value Ref Range    WBC 11.36 (H) 3.40 - 10.80 10*3/mm3    RBC 5.16 4.14 - 5.80 10*6/mm3    Hemoglobin 16.2 13.0 - 17.7 g/dL    Hematocrit 44.9 37.5 - 51.0 %    MCV 87.0 79.0 - 97.0 fL    MCH 31.4 26.6 - 33.0 pg    MCHC 36.1 (H) 31.5 - 35.7 g/dL    RDW 12.7 12.3 - 15.4 %    RDW-SD 40.1 37.0 - 54.0 fl    MPV 10.5 6.0 - 12.0 fL    Platelets 309 140 - 450 10*3/mm3    Neutrophil % 54.0 42.7 - 76.0 %    Lymphocyte % 29.6 19.6 - 45.3 %    Monocyte % 6.7 5.0 - 12.0 %    Eosinophil % 8.2 (H) 0.3 -  6.2 %    Basophil % 1.1 0.0 - 1.5 %    Immature Grans % 0.4 0.0 - 0.5 %    Neutrophils, Absolute 6.15 1.70 - 7.00 10*3/mm3    Lymphocytes, Absolute 3.36 (H) 0.70 - 3.10 10*3/mm3    Monocytes, Absolute 0.76 0.10 - 0.90 10*3/mm3    Eosinophils, Absolute 0.93 (H) 0.00 - 0.40 10*3/mm3    Basophils, Absolute 0.12 0.00 - 0.20 10*3/mm3    Immature Grans, Absolute 0.04 0.00 - 0.05 10*3/mm3    nRBC 0.0 0.0 - 0.2 /100 WBC   Lavender Top   Result Value Ref Range    Extra Tube hold for add-on      *Note: Due to a large number of results and/or encounters for the requested time period, some results have not been displayed. A complete set of results can be found in Results Review.         This document has been electronically signed by Karen Sheikh MD on November 13, 2022 08:19 CST

## (undated) DEVICE — Device: Brand: DISPOSABLE ELECTROSURGICAL SNARE

## (undated) DEVICE — CANN SMPL SOFTECH BIFLO ETCO2 A/M 7FT

## (undated) DEVICE — SINGLE-USE BIOPSY FORCEPS: Brand: RADIAL JAW 4

## (undated) DEVICE — BITEBLOCK ENDO W/STRAP 60F A/ LF DISP

## (undated) DEVICE — TRAP SXN POLYP QUICKCATCH LF